# Patient Record
Sex: FEMALE | Race: WHITE | NOT HISPANIC OR LATINO | Employment: FULL TIME | URBAN - METROPOLITAN AREA
[De-identification: names, ages, dates, MRNs, and addresses within clinical notes are randomized per-mention and may not be internally consistent; named-entity substitution may affect disease eponyms.]

---
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
## 2020-01-02 ENCOUNTER — PATIENT OUTREACH (OUTPATIENT)
Dept: ADMINISTRATIVE | Facility: HOSPITAL | Age: 20
End: 2020-01-02

## 2020-02-04 ENCOUNTER — OFFICE VISIT (OUTPATIENT)
Dept: FAMILY MEDICINE | Facility: CLINIC | Age: 20
End: 2020-02-04
Payer: COMMERCIAL

## 2020-02-04 VITALS
OXYGEN SATURATION: 99 % | BODY MASS INDEX: 40.66 KG/M2 | WEIGHT: 253 LBS | HEIGHT: 66 IN | HEART RATE: 97 BPM | TEMPERATURE: 99 F | SYSTOLIC BLOOD PRESSURE: 131 MMHG | DIASTOLIC BLOOD PRESSURE: 81 MMHG | RESPIRATION RATE: 17 BRPM

## 2020-02-04 DIAGNOSIS — L83 ACANTHOSIS NIGRICANS: ICD-10-CM

## 2020-02-04 DIAGNOSIS — E88.819 INSULIN RESISTANCE: ICD-10-CM

## 2020-02-04 DIAGNOSIS — E28.2 PCOS (POLYCYSTIC OVARIAN SYNDROME): ICD-10-CM

## 2020-02-04 DIAGNOSIS — L70.9 ACNE, UNSPECIFIED ACNE TYPE: ICD-10-CM

## 2020-02-04 DIAGNOSIS — I10 ESSENTIAL HYPERTENSION: Primary | ICD-10-CM

## 2020-02-04 PROCEDURE — 99214 PR OFFICE/OUTPT VISIT, EST, LEVL IV, 30-39 MIN: ICD-10-PCS | Mod: S$GLB,,, | Performed by: FAMILY MEDICINE

## 2020-02-04 PROCEDURE — 99214 OFFICE O/P EST MOD 30 MIN: CPT | Mod: S$GLB,,, | Performed by: FAMILY MEDICINE

## 2020-02-04 RX ORDER — TRETINOIN 0.5 MG/G
CREAM TOPICAL NIGHTLY
Qty: 45 G | Refills: 0 | Status: SHIPPED | OUTPATIENT
Start: 2020-02-04 | End: 2021-06-23

## 2020-02-04 RX ORDER — HYDROCHLOROTHIAZIDE 12.5 MG/1
12.5 TABLET ORAL DAILY
Qty: 30 TABLET | Refills: 11 | Status: SHIPPED | OUTPATIENT
Start: 2020-02-04 | End: 2022-11-23

## 2020-02-04 NOTE — PROGRESS NOTES
"  [facility] - Clinic Note    Subjective      Ms. Waters is a 19 y.o. female who presents to clinic for follow up.    Wearing a back brace at work. Is working at [employer]. Is going to physical therapy again for her back. Dr. Talley is following her.   Blood pressure has been running  140s/70s-80    PMH Aysha has a past medical history of Anxiety, Depression, Insulin resistance, Knee derangement, Obesity, PCOS (polycystic ovarian syndrome), and Thyroid disease.   PSXH Aysha has a past surgical history that includes Mouth surgery; Tonsillectomy; and Knee arthroscopy, medial patello femoral ligament repair.    Aysha's family history includes Heart disease in her father and paternal grandfather; Hypertension in her father; Obesity in her sister.   SH Aysha reports that she has never smoked. She has never used smokeless tobacco. She reports that she does not drink alcohol or use drugs.   ALG Aysha has No Known Allergies.   MED Aysha has a current medication list which includes the following prescription(s): ethynodiol-ethinyl estradiol, fluoxetine, lisinopril, metformin, tizanidine, vitamin d2, hydrochlorothiazide, and tretinoin.     Review of Systems   Respiratory: Negative for shortness of breath.    Cardiovascular: Negative for chest pain.   Musculoskeletal: Positive for back pain (improving).   Skin: Positive for color change (darkening of skin around neck and axilla).     ROS otherwise negative  Objective     /81 (BP Location: Left arm, Patient Position: Sitting, BP Method: Medium (Automatic))   Pulse 97   Temp 99 °F (37.2 °C) (Oral)   Resp 17   Ht 5' 6" (1.676 m)   Wt 114.8 kg (253 lb)   LMP 01/26/2020   SpO2 99%   BMI 40.84 kg/m²     Physical Exam   Constitutional: She appears well-developed and well-nourished. No distress.   Cardiovascular: Normal rate, regular rhythm, normal heart sounds and intact distal pulses.   No murmur heard.  Pulmonary/Chest: Effort normal and breath sounds normal. She " has no wheezes. She has no rales.   Abdominal: She exhibits no distension.   Central obesity with striae     Skin: She is not diaphoretic.   Hyperpigmented, velvety skin appearance c/w acanthosis nigricans in bilateral axilla   Psychiatric: She has a normal mood and affect.   Vitals reviewed.     Assessment/Plan     1. Essential hypertension  hydroCHLOROthiazide (HYDRODIURIL) 12.5 MG Tab   2. Acanthosis nigricans  tretinoin (RETIN-A) 0.05 % cream   3. Acne, unspecified acne type  tretinoin (RETIN-A) 0.05 % cream   4. Insulin resistance     5. PCOS (polycystic ovarian syndrome)         Not using zanaflex.  Still taking Metformin.   Start Retin-A topical for possible improvement of acanthosis nigricans and acne. Discussed that only case studies have shown improvement. Discussed risk/benefit.    Follow up in about 1 month (around 3/4/2020).    Future Appointments   Date Time Provider Department Center   3/5/2020  9:20 AM Jamila Smith MD [facility]       Jamila Smith MD  Family Medicine  [facility]  [phone]

## 2020-02-20 ENCOUNTER — OFFICE VISIT (OUTPATIENT)
Dept: FAMILY MEDICINE | Facility: CLINIC | Age: 20
End: 2020-02-20
Payer: COMMERCIAL

## 2020-02-20 ENCOUNTER — PATIENT OUTREACH (OUTPATIENT)
Dept: ADMINISTRATIVE | Facility: HOSPITAL | Age: 20
End: 2020-02-20

## 2020-02-20 ENCOUNTER — LAB VISIT (OUTPATIENT)
Dept: LAB | Facility: HOSPITAL | Age: 20
End: 2020-02-20
Attending: FAMILY MEDICINE
Payer: COMMERCIAL

## 2020-02-20 VITALS
RESPIRATION RATE: 15 BRPM | OXYGEN SATURATION: 98 % | WEIGHT: 245.63 LBS | SYSTOLIC BLOOD PRESSURE: 125 MMHG | BODY MASS INDEX: 39.48 KG/M2 | DIASTOLIC BLOOD PRESSURE: 77 MMHG | HEIGHT: 66 IN | TEMPERATURE: 98 F | HEART RATE: 91 BPM

## 2020-02-20 DIAGNOSIS — K92.1 HEMATOCHEZIA: Primary | ICD-10-CM

## 2020-02-20 DIAGNOSIS — E66.9 OBESITY, UNSPECIFIED CLASSIFICATION, UNSPECIFIED OBESITY TYPE, UNSPECIFIED WHETHER SERIOUS COMORBIDITY PRESENT: ICD-10-CM

## 2020-02-20 DIAGNOSIS — K92.1 HEMATOCHEZIA: ICD-10-CM

## 2020-02-20 LAB
ALBUMIN SERPL BCP-MCNC: 4.7 G/DL (ref 3.5–5.2)
ALP SERPL-CCNC: 58 U/L (ref 55–135)
ALT SERPL W/O P-5'-P-CCNC: 49 U/L (ref 10–44)
ANION GAP SERPL CALC-SCNC: 7 MMOL/L (ref 8–16)
AST SERPL-CCNC: 34 U/L (ref 10–40)
BASOPHILS # BLD AUTO: 0.07 K/UL (ref 0–0.2)
BASOPHILS NFR BLD: 0.7 % (ref 0–1.9)
BILIRUB SERPL-MCNC: 0.6 MG/DL (ref 0.1–1)
BUN SERPL-MCNC: 16 MG/DL (ref 6–20)
CALCIUM SERPL-MCNC: 9.2 MG/DL (ref 8.7–10.5)
CHLORIDE SERPL-SCNC: 105 MMOL/L (ref 95–110)
CHOLEST SERPL-MCNC: 200 MG/DL (ref 120–199)
CHOLEST/HDLC SERPL: 5.3 {RATIO} (ref 2–5)
CO2 SERPL-SCNC: 25 MMOL/L (ref 23–29)
CREAT SERPL-MCNC: 0.6 MG/DL (ref 0.5–1.4)
DIFFERENTIAL METHOD: ABNORMAL
EOSINOPHIL # BLD AUTO: 0.2 K/UL (ref 0–0.5)
EOSINOPHIL NFR BLD: 1.9 % (ref 0–8)
ERYTHROCYTE [DISTWIDTH] IN BLOOD BY AUTOMATED COUNT: 13.7 % (ref 11.5–14.5)
EST. GFR  (AFRICAN AMERICAN): >60 ML/MIN/1.73 M^2
EST. GFR  (NON AFRICAN AMERICAN): >60 ML/MIN/1.73 M^2
GLUCOSE SERPL-MCNC: 101 MG/DL (ref 70–110)
HCT VFR BLD AUTO: 36 % (ref 37–48.5)
HDLC SERPL-MCNC: 38 MG/DL (ref 40–75)
HDLC SERPL: 19 % (ref 20–50)
HGB BLD-MCNC: 11.8 G/DL (ref 12–16)
IMM GRANULOCYTES # BLD AUTO: 0.04 K/UL (ref 0–0.04)
IMM GRANULOCYTES NFR BLD AUTO: 0.4 % (ref 0–0.5)
LDLC SERPL CALC-MCNC: ABNORMAL MG/DL (ref 63–159)
LYMPHOCYTES # BLD AUTO: 4.3 K/UL (ref 1–4.8)
LYMPHOCYTES NFR BLD: 44.1 % (ref 18–48)
MCH RBC QN AUTO: 25.9 PG (ref 27–31)
MCHC RBC AUTO-ENTMCNC: 32.8 G/DL (ref 32–36)
MCV RBC AUTO: 79 FL (ref 82–98)
MONOCYTES # BLD AUTO: 0.5 K/UL (ref 0.3–1)
MONOCYTES NFR BLD: 5.2 % (ref 4–15)
NEUTROPHILS # BLD AUTO: 4.6 K/UL (ref 1.8–7.7)
NEUTROPHILS NFR BLD: 47.7 % (ref 38–73)
NONHDLC SERPL-MCNC: 162 MG/DL
NRBC BLD-RTO: 0 /100 WBC
PLATELET # BLD AUTO: 319 K/UL (ref 150–350)
PMV BLD AUTO: 10.3 FL (ref 9.2–12.9)
POTASSIUM SERPL-SCNC: 4.1 MMOL/L (ref 3.5–5.1)
PROT SERPL-MCNC: 7.3 G/DL (ref 6–8.4)
RBC # BLD AUTO: 4.55 M/UL (ref 4–5.4)
SODIUM SERPL-SCNC: 137 MMOL/L (ref 136–145)
TRIGL SERPL-MCNC: 768 MG/DL (ref 30–150)
WBC # BLD AUTO: 9.75 K/UL (ref 3.9–12.7)

## 2020-02-20 PROCEDURE — 85025 COMPLETE CBC W/AUTO DIFF WBC: CPT

## 2020-02-20 PROCEDURE — 36415 COLL VENOUS BLD VENIPUNCTURE: CPT

## 2020-02-20 PROCEDURE — 80053 COMPREHEN METABOLIC PANEL: CPT

## 2020-02-20 PROCEDURE — 99214 OFFICE O/P EST MOD 30 MIN: CPT | Mod: S$GLB,,, | Performed by: FAMILY MEDICINE

## 2020-02-20 PROCEDURE — 99214 PR OFFICE/OUTPT VISIT, EST, LEVL IV, 30-39 MIN: ICD-10-PCS | Mod: S$GLB,,, | Performed by: FAMILY MEDICINE

## 2020-02-20 PROCEDURE — 80061 LIPID PANEL: CPT

## 2020-02-20 NOTE — PROGRESS NOTES
[facility] - Clinic Note    Subjective      Ms. Waters is a 19 y.o. female who presents to clinic for acute care.    Takes metformin. Has been cutting back in the last week because of diarrhea.     One week ago. Maybe 10 days ago had an episode of blood in the stool. Every time she goes to the bathroom. Even if not having a BMP it will drip out.     Protein shake. Granola bar. Scrambled eggs and meat. Something else for dinner.     Diarrhea came first. Was going 5-6 times a day or more. Has had two days of constipation after about the 4th or 5th day.     No abdominal pain    Mom had to have reconstructive surgery on her colon  Has had this problem in the past when she was a child. No hemorrhoids at that time.       PMH Aysha has a past medical history of Anxiety, Depression, Insulin resistance, Knee derangement, Obesity, PCOS (polycystic ovarian syndrome), and Thyroid disease.   PSXH Aysha has a past surgical history that includes Mouth surgery; Tonsillectomy; and Knee arthroscopy, medial patello femoral ligament repair.   FH Aysha's family history includes Heart disease in her father and paternal grandfather; Hypertension in her father; Obesity in her sister.   SH Aysha reports that she has never smoked. She has never used smokeless tobacco. She reports that she does not drink alcohol or use drugs.   ALG Aysha has No Known Allergies.   MED Aysha has a current medication list which includes the following prescription(s): ethynodiol-ethinyl estradiol, fluoxetine, hydrochlorothiazide, lisinopril, metformin, tizanidine, tretinoin, and vitamin d2.     Review of Systems   Constitutional: Negative for fatigue and fever.   Respiratory: Negative for shortness of breath.    Gastrointestinal: Positive for anal bleeding, blood in stool, constipation, diarrhea and rectal pain. Negative for abdominal distention, abdominal pain, nausea and vomiting.   Musculoskeletal: Negative for arthralgias.     ROS otherwise  "negative  Objective     /77 (BP Location: Left arm, Patient Position: Sitting, BP Method: X-Large (Automatic))   Pulse 91   Temp 98 °F (36.7 °C) (Oral)   Resp 15   Ht 5' 6" (1.676 m)   Wt 111.4 kg (245 lb 9.6 oz)   LMP 01/17/2020   SpO2 98%   BMI 39.64 kg/m²     Physical Exam   Constitutional: She appears well-developed and well-nourished. No distress.   Well appearing   HENT:   Head: Normocephalic and atraumatic.   Right Ear: External ear normal.   Left Ear: External ear normal.   Nose: Nose normal.   Mouth/Throat: Oropharynx is clear and moist.   Cardiovascular: Normal rate, regular rhythm, normal heart sounds and intact distal pulses.   Pulmonary/Chest: Effort normal and breath sounds normal. She has no wheezes. She has no rales.   Abdominal: Soft. Bowel sounds are normal. She exhibits no distension and no mass. There is no tenderness. There is no rebound and no guarding.   Neurological: She is alert.   Skin: Skin is warm and dry. Capillary refill takes less than 2 seconds. She is not diaphoretic.   Psychiatric: She has a normal mood and affect. Her behavior is normal.   Vitals reviewed.     Assessment/Plan     1. Hematochezia  Ambulatory referral/consult to Gastroenterology    Comprehensive metabolic panel    CBC auto differential     Reviewed weight loss strategies  Patient is hemodynamically stable. Refer to GI for chronic hematochezia with possible family h/o IBD?  Retin-A is working      Future Appointments   Date Time Provider Department Center   3/5/2020  9:20 AM Jamila Smith MD [facility]   3/9/2020  8:00 AM Baldo Brush MD [facility]       Jamila Smith MD  Family Medicine  [facility]  [phone]    "

## 2020-03-16 ENCOUNTER — HOSPITAL ENCOUNTER (EMERGENCY)
Facility: HOSPITAL | Age: 20
Discharge: HOME | End: 2020-03-16
Attending: EMERGENCY MEDICINE
Payer: COMMERCIAL

## 2020-03-16 VITALS
TEMPERATURE: 98 F | HEART RATE: 91 BPM | BODY MASS INDEX: 38.57 KG/M2 | DIASTOLIC BLOOD PRESSURE: 92 MMHG | WEIGHT: 240 LBS | RESPIRATION RATE: 18 BRPM | SYSTOLIC BLOOD PRESSURE: 138 MMHG | HEIGHT: 66 IN | OXYGEN SATURATION: 100 %

## 2020-03-16 DIAGNOSIS — R05.9 COUGH: ICD-10-CM

## 2020-03-16 DIAGNOSIS — J20.9 ACUTE BRONCHITIS, UNSPECIFIED ORGANISM: ICD-10-CM

## 2020-03-16 DIAGNOSIS — J18.9 COMMUNITY ACQUIRED PNEUMONIA, UNSPECIFIED LATERALITY: Primary | ICD-10-CM

## 2020-03-16 LAB
GROUP A STREP, MOLECULAR: NEGATIVE
INFLUENZA A, MOLECULAR: NEGATIVE
INFLUENZA B, MOLECULAR: NEGATIVE
SPECIMEN SOURCE: NORMAL

## 2020-03-16 PROCEDURE — 71046 X-RAY EXAM CHEST 2 VIEWS: CPT | Mod: TC,FY

## 2020-03-16 PROCEDURE — 71046 X-RAY EXAM CHEST 2 VIEWS: CPT | Mod: 26,,, | Performed by: RADIOLOGY

## 2020-03-16 PROCEDURE — 87502 INFLUENZA DNA AMP PROBE: CPT

## 2020-03-16 PROCEDURE — 87651 STREP A DNA AMP PROBE: CPT

## 2020-03-16 PROCEDURE — 71046 XR CHEST PA AND LATERAL: ICD-10-PCS | Mod: 26,,, | Performed by: RADIOLOGY

## 2020-03-16 PROCEDURE — 63600175 PHARM REV CODE 636 W HCPCS: Performed by: NURSE PRACTITIONER

## 2020-03-16 PROCEDURE — 99284 EMERGENCY DEPT VISIT MOD MDM: CPT | Mod: 25

## 2020-03-16 PROCEDURE — 96372 THER/PROPH/DIAG INJ SC/IM: CPT

## 2020-03-16 RX ORDER — CEFTRIAXONE 1 G/1
1 INJECTION, POWDER, FOR SOLUTION INTRAMUSCULAR; INTRAVENOUS
Status: COMPLETED | OUTPATIENT
Start: 2020-03-16 | End: 2020-03-16

## 2020-03-16 RX ORDER — AZITHROMYCIN 500 MG/1
500 TABLET, FILM COATED ORAL DAILY
Qty: 5 TABLET | Refills: 0 | Status: SHIPPED | OUTPATIENT
Start: 2020-03-16 | End: 2020-03-21

## 2020-03-16 RX ADMIN — CEFTRIAXONE SODIUM 1 G: 1 INJECTION, POWDER, FOR SOLUTION INTRAMUSCULAR; INTRAVENOUS at 11:03

## 2020-03-16 NOTE — ED PROVIDER NOTES
Encounter Date: 3/16/2020       History     Chief Complaint   Patient presents with    Cough     Onset of symptoms x 4 days ago.    Nasal Congestion    Fever    Generalized Body Aches    Chills    Sore Throat     C/o cough, sore throat & fever x4 days, Tmax 100, cough started first; denies any alleviating or exacerbating factors, no OTC medications attempted -- denies headache, vision changes, neck pain, painful/difficulty swallowing, chest pain, shortness of breath, abdominal pain, nausea/vomiting/diarrhea, hematuria/dysuria, vaginal bleeding/discharge -- no previous evaluation has been performed nor his PCP been contacted for today's concerns    Past medical/surgical history, allergies and current medications reviewed with patient    LMP 1st week of March, reported as normal    Respiratory Illness Screening  Recent travel outside of the [place]:  No  Symptoms:  fever, cough, sore throat  Length of symptoms:  4days  Difficulty breathing:  No      The history is provided by the patient. No  was used.     Review of patient's allergies indicates:  No Known Allergies  Past Medical History:   Diagnosis Date    Anxiety     Depression     Insulin resistance     Knee derangement     Obesity     PCOS (polycystic ovarian syndrome)     Thyroid disease      Past Surgical History:   Procedure Laterality Date    KNEE ARTHROSCOPY, MEDIAL PATELLO FEMORAL LIGAMENT REPAIR      MOUTH SURGERY      TONSILLECTOMY       Family History   Problem Relation Age of Onset    Heart disease Father     Hypertension Father     Obesity Sister     Heart disease Paternal Grandfather      Social History     Tobacco Use    Smoking status: Never Smoker    Smokeless tobacco: Never Used   Substance Use Topics    Alcohol use: No    Drug use: No     Review of Systems   Constitutional: Positive for fever.   HENT: Negative for sore throat.    Respiratory: Positive for cough. Negative for shortness of breath.     Cardiovascular: Negative for chest pain.   Gastrointestinal: Negative for nausea.   Genitourinary: Negative for dysuria.   Musculoskeletal: Negative for back pain.   Skin: Negative for rash.   Neurological: Negative for weakness.   Hematological: Does not bruise/bleed easily.   All other systems reviewed and are negative.      Physical Exam     Initial Vitals [03/16/20 1105]   BP Pulse Resp Temp SpO2   (!) 138/92 91 18 97.8 °F (36.6 °C) 100 %      MAP       --         Physical Exam    Nursing note and vitals reviewed.  Constitutional: She is not diaphoretic. She is Obese . No distress.   Afebrile, vital signs stable   HENT:   Head: Normocephalic.   Right Ear: Tympanic membrane, external ear and ear canal normal.   Left Ear: Tympanic membrane, external ear and ear canal normal.   Nose: Nose normal.   Mouth/Throat: Uvula is midline, oropharynx is clear and moist and mucous membranes are normal.   Eyes: Lids are normal.   Neck: Neck supple.   Cardiovascular: Normal rate.   Pulmonary/Chest: Effort normal and breath sounds normal. No respiratory distress.   Abdominal: Soft. Bowel sounds are normal. She exhibits no distension. There is no tenderness.   Obese abdomen makes for a challenging exam due to large habitus   Lymphadenopathy:     She has no cervical adenopathy.   Neurological: She is alert.   Skin: No rash noted.   Psychiatric:   Patient is pleasant and cooperative, normal affect         ED Course   Procedures  Labs Reviewed   GROUP A STREP, MOLECULAR   INFLUENZA A & B BY MOLECULAR          Imaging Results          X-Ray Chest PA And Lateral (Final result)  Result time 03/16/20 11:43:05    Final result by Jewel Esquivel MD (03/16/20 11:43:05)                 Impression:      No acute abnormality.      Electronically signed by: Jewel Esquivel  Date:    03/16/2020  Time:    11:43             Narrative:    EXAMINATION:  XR CHEST PA AND LATERAL    CLINICAL HISTORY:  Cough    TECHNIQUE:  PA and lateral views of  the chest were performed.    COMPARISON:  03/24/2014.    FINDINGS:  The lungs are clear, with normal appearance of pulmonary vasculature and no pleural effusion or pneumothorax.    The cardiac silhouette is normal in size. The hilar and mediastinal contours are unremarkable.    Bones are intact.                                 Medical Decision Making:   ED Management:  Chest x-ray image reviewed:  Bilateral perihilar infiltrates; over readpending Radiology    Lab results reviewed, significant findings:  Rapid strep test and influenza negative    Medications given:  Rocephin    Findings and plan of care discussed with patient:  CAP, acute bronchitis; will discharge patient home with a Zithromax and Rynex DM, care instructions given -- further instructions given to follow-up with PCP this week    All questions answered, strict return precautions given, patient verbalized understanding to all instructions, pleasant visit    Disclaimer:  This note was prepared with [employer] Naturally Speaking voice recognition transcription software. Garbled syntax, mangled pronouns, and other bizarre constructions may be attributed to that software system.                     ED Course as of Mar 16 1151   Mon Mar 16, 2020   1104 C/o cough, sore throat & fever x4 days, Tmax 100, cough started first    I have performed the rapid medical exam (RME) portion of the medical screening exam (MSE) & have determined that this patient requires further evaluation and/or testing to determine if emergent medical condition exists     [DH]      ED Course User Index  [DH] Leonel Treviño NP                Clinical Impression:       ICD-10-CM ICD-9-CM   1. Community acquired pneumonia, unspecified laterality J18.9 486   2. Cough R05 786.2   3. Acute bronchitis, unspecified organism J20.9 466.0             ED Disposition Condition    Discharge Stable        ED Prescriptions     Medication Sig Dispense Start Date End Date Auth. Provider    azithromycin  (ZITHROMAX) 500 MG tablet Take 1 tablet (500 mg total) by mouth once daily. for 5 days 5 tablet 3/16/2020 3/21/2020 Leonel Treviño NP    brompheniramin-phenylephrin-DM 1-2.5-5 mg/5 mL Soln Take 5 mLs by mouth every 4 (four) hours as needed. 120 mL 3/16/2020 3/26/2020 Leonel Treviño NP        Follow-up Information     Follow up With Specialties Details Why Contact Info    Jamila Smith MD Family Medicine Go to  This week for follow-up [address] [facility] [place] [address] [phone]                                       Leonel Treviño NP  03/16/20 4169

## 2020-03-16 NOTE — DISCHARGE INSTRUCTIONS
Take OTC Motrin/Tylenol as needed for pain/fever    Complete all antibiotics as prescribed    Take all medications as prescribed.  Follow-up with your primary care provider as discussed.  Please remember that you had a visit to the emergency room today and this does not substitute as primary care services for ongoing management because emergency services is a snap shot in time.  Should you have any worsening condition that requires emergency services do not hesitate to return to the ER.

## 2020-03-18 ENCOUNTER — TELEPHONE (OUTPATIENT)
Dept: FAMILY MEDICINE | Facility: CLINIC | Age: 20
End: 2020-03-18

## 2020-03-18 ENCOUNTER — PATIENT MESSAGE (OUTPATIENT)
Dept: FAMILY MEDICINE | Facility: CLINIC | Age: 20
End: 2020-03-18

## 2020-03-18 NOTE — TELEPHONE ENCOUNTER
Spoke with patient over the phone. She is having sx of cough, shortness of breath, chest pain. Is taking antibiotics as prescribed. Subjective fever. Advised to stay out of work until symptoms resolve.

## 2020-03-23 ENCOUNTER — TELEPHONE (OUTPATIENT)
Dept: FAMILY MEDICINE | Facility: CLINIC | Age: 20
End: 2020-03-23

## 2020-03-23 NOTE — TELEPHONE ENCOUNTER
----- Message from Jamila Smith MD sent at 3/19/2020  1:08 PM CDT -----  Please let patient know that her triglycerides remain very high. She is already working on diet and exercise, but she needs to continue to remain focused on this. We will recheck her cholesterol in about 6 months to evaluate for worsening/progression or hopefully improvement.

## 2020-05-05 ENCOUNTER — PATIENT MESSAGE (OUTPATIENT)
Dept: ADMINISTRATIVE | Facility: HOSPITAL | Age: 20
End: 2020-05-05

## 2020-05-20 ENCOUNTER — PATIENT MESSAGE (OUTPATIENT)
Dept: ADMINISTRATIVE | Facility: HOSPITAL | Age: 20
End: 2020-05-20

## 2020-06-29 ENCOUNTER — OFFICE VISIT (OUTPATIENT)
Dept: URGENT CARE | Facility: CLINIC | Age: 20
End: 2020-06-29
Payer: COMMERCIAL

## 2020-06-29 VITALS
SYSTOLIC BLOOD PRESSURE: 144 MMHG | DIASTOLIC BLOOD PRESSURE: 95 MMHG | OXYGEN SATURATION: 99 % | HEART RATE: 104 BPM | RESPIRATION RATE: 12 BRPM | WEIGHT: 248 LBS | BODY MASS INDEX: 40.03 KG/M2

## 2020-06-29 DIAGNOSIS — M54.41 ACUTE RIGHT-SIDED LOW BACK PAIN WITH RIGHT-SIDED SCIATICA: Primary | ICD-10-CM

## 2020-06-29 DIAGNOSIS — R82.998 LEUKOCYTES IN URINE: ICD-10-CM

## 2020-06-29 LAB
B-HCG UR QL: NEGATIVE
BILIRUB UR QL STRIP: NEGATIVE
CTP QC/QA: YES
GLUCOSE UR QL STRIP: NEGATIVE
KETONES UR QL STRIP: NEGATIVE
LEUKOCYTE ESTERASE UR QL STRIP: POSITIVE
PH, POC UA: 5.5
POC BLOOD, URINE: NEGATIVE
POC NITRATES, URINE: NEGATIVE
PROT UR QL STRIP: NEGATIVE
SP GR UR STRIP: 1.02 (ref 1–1.03)
UROBILINOGEN UR STRIP-ACNC: NORMAL (ref 0.1–1.1)

## 2020-06-29 PROCEDURE — 81003 URINALYSIS AUTO W/O SCOPE: CPT | Mod: QW,S$GLB,, | Performed by: NURSE PRACTITIONER

## 2020-06-29 PROCEDURE — 81025 PR  URINE PREGNANCY TEST: ICD-10-PCS | Mod: S$GLB,,, | Performed by: NURSE PRACTITIONER

## 2020-06-29 PROCEDURE — 81025 URINE PREGNANCY TEST: CPT | Mod: S$GLB,,, | Performed by: NURSE PRACTITIONER

## 2020-06-29 PROCEDURE — 96372 PR INJECTION,THERAP/PROPH/DIAG2ST, IM OR SUBCUT: ICD-10-PCS | Mod: S$GLB,,, | Performed by: EMERGENCY MEDICINE

## 2020-06-29 PROCEDURE — 99204 PR OFFICE/OUTPT VISIT, NEW, LEVL IV, 45-59 MIN: ICD-10-PCS | Mod: 25,S$GLB,, | Performed by: NURSE PRACTITIONER

## 2020-06-29 PROCEDURE — 96372 THER/PROPH/DIAG INJ SC/IM: CPT | Mod: S$GLB,,, | Performed by: EMERGENCY MEDICINE

## 2020-06-29 PROCEDURE — 99204 OFFICE O/P NEW MOD 45 MIN: CPT | Mod: 25,S$GLB,, | Performed by: NURSE PRACTITIONER

## 2020-06-29 PROCEDURE — 81003 POCT URINALYSIS, DIPSTICK, AUTOMATED, W/O SCOPE: ICD-10-PCS | Mod: QW,S$GLB,, | Performed by: NURSE PRACTITIONER

## 2020-06-29 RX ORDER — DICLOFENAC SODIUM 50 MG/1
50 TABLET, DELAYED RELEASE ORAL 2 TIMES DAILY
Qty: 20 TABLET | Refills: 0 | Status: SHIPPED | OUTPATIENT
Start: 2020-06-29 | End: 2020-07-09

## 2020-06-29 RX ORDER — PREDNISONE 20 MG/1
40 TABLET ORAL DAILY
Qty: 10 TABLET | Refills: 0 | Status: SHIPPED | OUTPATIENT
Start: 2020-06-29 | End: 2020-07-04

## 2020-06-29 RX ORDER — DEXAMETHASONE SODIUM PHOSPHATE 4 MG/ML
8 INJECTION, SOLUTION INTRA-ARTICULAR; INTRALESIONAL; INTRAMUSCULAR; INTRAVENOUS; SOFT TISSUE
Status: COMPLETED | OUTPATIENT
Start: 2020-06-29 | End: 2020-06-29

## 2020-06-29 RX ORDER — KETOROLAC TROMETHAMINE 30 MG/ML
30 INJECTION, SOLUTION INTRAMUSCULAR; INTRAVENOUS
Status: COMPLETED | OUTPATIENT
Start: 2020-06-29 | End: 2020-06-29

## 2020-06-29 RX ORDER — METHOCARBAMOL 750 MG/1
TABLET, FILM COATED ORAL
Qty: 30 TABLET | Refills: 0 | Status: SHIPPED | OUTPATIENT
Start: 2020-06-29 | End: 2020-10-12

## 2020-06-29 RX ADMIN — KETOROLAC TROMETHAMINE 30 MG: 30 INJECTION, SOLUTION INTRAMUSCULAR; INTRAVENOUS at 07:06

## 2020-06-29 RX ADMIN — DEXAMETHASONE SODIUM PHOSPHATE 8 MG: 4 INJECTION, SOLUTION INTRA-ARTICULAR; INTRALESIONAL; INTRAMUSCULAR; INTRAVENOUS; SOFT TISSUE at 07:06

## 2020-06-29 NOTE — PROGRESS NOTES
Subjective:       Patient ID: Aysha Waters is a 19 y.o. female.    Vitals:  weight is 112.5 kg (248 lb). Her blood pressure is 144/95 (abnormal) and her pulse is 104. Her respiration is 12 and oxygen saturation is 99%.     Chief Complaint: Lumbar Spine Pain (L-Spine)    Patient complains of right lower back pain that radiates into her buttocks and right leg. Denies trauma or injury. Denies numbness/tingling, bowel or bladder incontinence. History of lumbar pain.       Back Pain  The problem occurs constantly. The problem has been gradually worsening since onset. The pain is present in the lumbar spine. The pain is at a severity of 7/10. The pain is mild. The pain is the same all the time. The symptoms are aggravated by position, bending, lying down, sitting, standing and twisting. Pertinent negatives include no abdominal pain, chest pain, dysuria, fever, headaches or weakness. Risk factors: pinched nerve in the lumbar  Treatments tried: muscle rubs , aleve, tylenol  The treatment provided no relief.       Constitution: Negative for chills, fatigue and fever.   HENT: Negative for congestion and sore throat.    Neck: Negative for painful lymph nodes.   Cardiovascular: Negative for chest pain and leg swelling.   Eyes: Negative for double vision and blurred vision.   Respiratory: Negative for cough and shortness of breath.    Gastrointestinal: Negative for abdominal pain, nausea, vomiting and diarrhea.   Genitourinary: Negative for dysuria, frequency, urgency and history of kidney stones.   Musculoskeletal: Positive for back pain. Negative for joint pain, joint swelling, muscle cramps and muscle ache.   Skin: Negative for color change, pale, rash and bruising.   Allergic/Immunologic: Negative for seasonal allergies.   Neurological: Negative for dizziness, history of vertigo, light-headedness, passing out and headaches.   Hematologic/Lymphatic: Negative for swollen lymph nodes.   Psychiatric/Behavioral: Negative for  nervous/anxious, sleep disturbance and depression. The patient is not nervous/anxious.        Objective:      Physical Exam   Constitutional: She is oriented to person, place, and time. She appears well-developed. She is cooperative.  Non-toxic appearance. She does not appear ill. No distress.   HENT:   Head: Normocephalic and atraumatic.   Right Ear: Hearing, tympanic membrane, external ear and ear canal normal.   Left Ear: Hearing, tympanic membrane, external ear and ear canal normal.   Nose: Nose normal. No mucosal edema, rhinorrhea or nasal deformity. No epistaxis. Right sinus exhibits no maxillary sinus tenderness and no frontal sinus tenderness. Left sinus exhibits no maxillary sinus tenderness and no frontal sinus tenderness.   Mouth/Throat: Uvula is midline, oropharynx is clear and moist and mucous membranes are normal. No trismus in the jaw. Normal dentition. No uvula swelling. No posterior oropharyngeal erythema.   Eyes: Conjunctivae and lids are normal. Right eye exhibits no discharge. Left eye exhibits no discharge. No scleral icterus.   Neck: Trachea normal, normal range of motion, full passive range of motion without pain and phonation normal. Neck supple.   Cardiovascular: Normal rate, regular rhythm, normal heart sounds and normal pulses.   Pulmonary/Chest: Effort normal and breath sounds normal. No respiratory distress.   Abdominal: Soft. Normal appearance and bowel sounds are normal. She exhibits no distension, no pulsatile midline mass and no mass. There is no abdominal tenderness.   Musculoskeletal:         General: No deformity.      Lumbar back: She exhibits decreased range of motion, tenderness, pain and spasm. She exhibits no bony tenderness, no swelling, no edema, no deformity, no laceration and normal pulse.        Back:       Comments: No midline tenderness. + right straight leg test    Neurological: She is alert and oriented to person, place, and time. She exhibits normal muscle tone.  Coordination normal. GCS eye subscore is 4. GCS verbal subscore is 5. GCS motor subscore is 6.   Skin: Skin is warm, dry, intact, not diaphoretic and not pale.   Psychiatric: Her speech is normal and behavior is normal. Judgment and thought content normal.   Nursing note and vitals reviewed.        Assessment:       1. Acute right-sided low back pain with right-sided sciatica    2. Leukocytes in urine        Plan:       UPT negative. 10 leukocytes in urine, patient reports no urinary symptoms. Will send for urine culture and treat accordingly at that time if needed.     After complete evaluation, including thorough history and physical exam, the patient's symptoms are most likely due to mechanical/MSK back pain. There are no concerning features of bilateral weakness, bowel/bladder incontinence, significant new motor/sensory deficits, or saddle anesthesia to suggest acute cauda equina syndrome. On physical exam, there is no focal midline tenderness or evidence of significant trauma to suggest fracture or injury. There is no fever, immunocompromise, history of recent surgery, or erythema/fluctuance to suggest epidural hematoma, infection, or abscess. The patient was treated with supportive care decadron and toradol and improved. Will provide short course RX of diclofenac, prednisone and robaxin upon D/C. Counseled on resuming activity as tolerated to accelerate improvement in symptoms.    For acute pain, rest, intermittent application of cold packs (later, may switch to heat, but do not sleep on heating pad), analgesics and muscle relaxants are recommended. Discussed longer term treatment plan of prn NSAID's and discussed a home back care exercise program with flexion exercise routine. Proper lifting with avoidance of heavy lifting discussed. Consider Physical Therapy and XRay studies if not improving. Call or return to clinic prn if these symptoms worsen or fail to improve as anticipated.    Acute right-sided low  back pain with right-sided sciatica  -     POCT Urinalysis, Dipstick, Automated, W/O Scope  -     POCT urine pregnancy    Leukocytes in urine  -     Urine culture    Other orders  -     dexamethasone injection 8 mg  -     ketorolac injection 30 mg  -     methocarbamoL (ROBAXIN) 750 MG Tab; Take 1-2 tablets by mouth every 8 hours as needed for muscle spasms  Dispense: 30 tablet; Refill: 0  -     diclofenac (VOLTAREN) 50 MG EC tablet; Take 1 tablet (50 mg total) by mouth 2 (two) times daily. for 10 days  Dispense: 20 tablet; Refill: 0  -     predniSONE (DELTASONE) 20 MG tablet; Take 2 tablets (40 mg total) by mouth once daily. for 5 days  Dispense: 10 tablet; Refill: 0

## 2020-06-30 NOTE — PATIENT INSTRUCTIONS
Back Pain (Acute or Chronic)    Back pain is one of the most common problems. The good news is that most people feel better in 1 to 2 weeks, and most of the rest in 1 to 2 months. Most people can remain active.  People experience and describe pain differently; not everyone is the same.  · The pain can be sharp, stabbing, shooting, aching, cramping or burning.  · Movement, standing, bending, lifting, sitting, or walking may worsen pain.  · It can be localized to one spot or area, or it can be more generalized.  · It can spread or radiate upwards, to the front, or go down your arms or legs (sciatica).  · It can cause muscle spasm.  Most of the time, mechanical problems with the muscles or spine cause the pain. Mechanical problems are usually caused by an injury to the muscles or ligaments. While illness can cause back pain, it is usually not caused by a serious illness. Mechanical problems include:   · Physical activity such as sports, exercise, work, or normal activity  · Overexertion, lifting, pushing, pulling incorrectly or too aggressively  · Sudden twisting, bending, or stretching from an accident, or accidental movement  · Poor posture  · Stretching or moving wrong, without noticing pain at the time  · Poor coordination, lack of regular exercise (check with your doctor about this)  · Spinal disc disease or arthritis  · Stress  Pain can also be related to pregnancy, or illness like appendicitis, bladder or kidney infections, pelvic infections, and many other things.  Acute back pain usually gets better in 1 to 2 weeks. Back pain related to disk disease, arthritis in the spinal joints or spinal stenosis (narrowing of the spinal canal) can become chronic and last for months or years.  Unless you had a physical injury (for example, a car accident or fall) X-rays are usually not needed for the initial evaluation of back pain. If pain continues and does not respond to medical treatment, X-rays and other tests may be  needed.  Home care  Try these home care recommendations:  · When in bed, try to find a position of comfort. A firm mattress is best. Try lying flat on your back with pillows under your knees. You can also try lying on your side with your knees bent up towards your chest and a pillow between your knees.  · At first, do not try to stretch out the sore spots. If there is a strain, it is not like the good soreness you get after exercising without an injury. In this case, stretching may make it worse.  · Avoid prolong sitting, long car rides, or travel. This puts more stress on the lower back than standing or walking.  · During the first 24 to 72 hours after an acute injury or flare up of chronic back pain, apply an ice pack to the painful area for 20 minutes and then remove it for 20 minutes. Do this over a period of 60 to 90 minutes or several times a day. This will reduce swelling and pain. Wrap the ice pack in a thin towel or plastic to protect your skin.  · You can start with ice, then switch to heat. Heat (hot shower, hot bath, or heating pad) reduces pain and works well for muscle spasms. Heat can be applied to the painful area for 20 minutes then remove it for 20 minutes. Do this over a period of 60 to 90 minutes or several times a day. Do not sleep on a heating pad. It can lead to skin burns or tissue damage.  · You can alternate ice and heat therapy. Talk with your doctor about the best treatment for your back pain.  · Therapeutic massage can help relax the back muscles without stretching them.  · Be aware of safe lifting methods and do not lift anything without stretching first.  Medicines  Talk to your doctor before using medicine, especially if you have other medical problems or are taking other medicines.  · You may use over-the-counter medicine as directed on the bottle to control pain, unless another pain medicine was prescribed. If you have chronic conditions like diabetes, liver or kidney disease,  stomach ulcers, or gastrointestinal bleeding, or are taking blood thinners, talk to your doctor before taking any medicine.  · Be careful if you are given a prescription medicines, narcotics, or medicine for muscle spasms. They can cause drowsiness, affect your coordination, reflexes, and judgement. Do not drive or operate heavy machinery.  Follow-up care  Follow up with your healthcare provider, or as advised.   A radiologist will review any X-rays that were taken. Your provide will notify you of any new findings that may affect your care.  Call 911  Call emergency services if any of the following occur:  · Trouble breathing  · Confusion  · Very drowsy or trouble awakening  · Fainting or loss of consciousness  · Rapid or very slow heart rate  · Loss of bowel or bladder control  When to seek medical advice  Call your healthcare provider right away if any of these occur:   · Pain becomes worse or spreads to your legs  · Weakness or numbness in one or both legs  · Numbness in the groin or genital area  Date Last Reviewed: 7/1/2016 © 2000-2017 [employer]. [address] [place] [address]. All rights reserved. This information is not intended as a substitute for professional medical care. Always follow your healthcare professional's instructions.        Relieving Back Pain  Back pain is a common problem. You can strain back muscles by lifting too much weight or just by moving the wrong way. Back strain can be uncomfortable, even painful. And it can take weeks or months to improve. To help yourself feel better and prevent future back strains, try these tips.  Important Note: Do not give aspirin to children or teens without first discussing it with your healthcare provider.      ? Ice    Ice reduces muscle pain and swelling. It helps most during the first 24 to 48 hours after an injury.  · Wrap an ice pack or a bag of frozen peas in a thin towel. (Never place ice directly on your skin.)  · Place  the ice where your back hurts the most.  · Dont ice for more than 20 minutes at a time.  · You can use ice several times a day.  ? Medicines  Over-the-counter pain relievers can include acetaminophen and anti-inflammatory medicines, which includes aspirin or ibuprofen. They can help ease discomfort. Some also reduce swelling.  · Tell your healthcare provider about any medicines you are already taking.  · Take medicines only as directed.  ? Heat  After the first 48 hours, heat can relax sore muscles and improve blood flow.  · Try a warm bath or shower. Or use a heating pad set on low. To prevent a burn, keep a cloth between you and the heating pad.  · Dont use a heating pad for more than 15 minutes at a time. Never sleep on a heating pad.  Date Last Reviewed: 9/1/2015  © 5109-7942 [employer]. [address] [place] [address]. All rights reserved. This information is not intended as a substitute for professional medical care. Always follow your healthcare professional's instructions.        Sciatica    Sciatica is a condition that causes pain in the lower back that spreads down into the buttock, hip, and leg. Sometimes the leg pain can happen without any back pain. Sciatica happens when a spinal nerve is irritated or has pressure put on it as comes out of the spinal canal in the lower back. This most often happens when a bulge or rupture of a nearby spinal disk presses on the nerve. Sciatica can also be caused by a narrowing of the spinal canal (spinal stenosis) or spasm of the muscle in the buttocks that the sciatic nerve passes through (pyriform muscle). Sciatica is also called lumbar radiculopathy.  Sciatica may begin after a sudden twisting or bending force, such as in a car accident. Or it can happen after a simple awkward movement. In either case, muscle spasm often also happens. Muscle spasm makes the pain worse.  A healthcare provider makes a diagnosis of sciatica from your symptoms  and a physical exam. Unless you had an injury from a car accident or fall, you usually wont have X-rays taken at this time. This is because the nerves and disks in your back cant be seen on an X-ray. If the provider sees signs of a compressed nerve, you will need to schedule an MRI scan as an outpatient. Signs of a compressed nerve include loss of strength in a leg.  Most sciatica gets better with medicine, exercise, and physical therapy. If your symptoms continue after at least 3 months of medical treatment, you may need surgery or injections to your lower back.  Home care  Follow these tips when caring for yourself at home:  · You may need to stay in bed the first few days. But as soon as possible, begin sitting up or walking. This will help you avoid problems that come from staying in bed for long periods.  · When in bed, try to find a position that is comfortable. A firm mattress is best. Try lying flat on your back with pillows under your knees. You can also try lying on your side with your knees bent up toward your chest and a pillow between your knees.  · Avoid sitting for long periods. This puts more stress on your lower back than standing or walking.  · Use heat from a hot shower, hot bath, or heating pad to help ease pain. Massage can also help. You can also try using an ice pack. You can make your own ice pack by putting ice cubes in a plastic bag. Wrap the bag in a thin towel. Try both heat and cold to see which works best. Use the method that feels best for 20 minutes several times a day.  · You may use acetaminophen or ibuprofen to ease pain, unless another pain medicine was prescribed. Note: If you have chronic liver or kidney disease, talk with your healthcare provider before taking these medicines. Also talk with your provider if youve had a stomach ulcer or gastrointestinal bleeding.  · Use safe lifting methods. Dont lift anything heavier than 15 pounds until all of the pain is gone.  Follow-up  care  Follow up with your healthcare provider, or as advised. You may need physical therapy or additional tests.  If X-rays were taken, a radiologist will look at them. You will be told of any new findings that may affect your care.  When to seek medical advice  Call your healthcare provider right away if any of these occur:  · Pain gets worse even after taking prescribed medicine  · Weakness or numbness in 1 or both legs or hips  · Numbness in your groin or genital area  · You cant control your bowel or bladder  · Fever  · Redness or swelling over your back or spine   Date Last Reviewed: 8/1/2016  © 2510-6048 [employer]. [address] [place] [address], [place] [address]. All rights reserved. This information is not intended as a substitute for professional medical care. Always follow your healthcare professional's instructions.

## 2020-07-06 LAB
BACTERIA UR CULT: ABNORMAL
BACTERIA UR CULT: ABNORMAL
OTHER ANTIBIOTIC SUSC ISLT: ABNORMAL

## 2020-07-06 RX ORDER — NITROFURANTOIN 25; 75 MG/1; MG/1
100 CAPSULE ORAL 2 TIMES DAILY
Qty: 14 CAPSULE | Refills: 0 | Status: SHIPPED | OUTPATIENT
Start: 2020-07-06 | End: 2020-07-09 | Stop reason: ALTCHOICE

## 2020-07-07 ENCOUNTER — TELEPHONE (OUTPATIENT)
Dept: URGENT CARE | Facility: CLINIC | Age: 20
End: 2020-07-07

## 2020-07-07 NOTE — TELEPHONE ENCOUNTER
Called patient to let her know the urine culture came back positive for bacterial growth so we sent in a prescription  (macrobid) to her pharmacy on file. I advised her to pick it up and make sure to finish it for the full time. Pt expressed understanding.

## 2020-07-07 NOTE — TELEPHONE ENCOUNTER
----- Message from ARMEN Ortiz sent at 7/6/2020 11:33 AM CDT -----  Culture results positive, will call in RX for macrobid to pharmacy on file

## 2020-07-09 ENCOUNTER — OFFICE VISIT (OUTPATIENT)
Dept: FAMILY MEDICINE | Facility: CLINIC | Age: 20
End: 2020-07-09
Payer: COMMERCIAL

## 2020-07-09 VITALS
SYSTOLIC BLOOD PRESSURE: 120 MMHG | DIASTOLIC BLOOD PRESSURE: 79 MMHG | WEIGHT: 252.38 LBS | OXYGEN SATURATION: 98 % | BODY MASS INDEX: 40.56 KG/M2 | TEMPERATURE: 97 F | RESPIRATION RATE: 16 BRPM | HEART RATE: 97 BPM | HEIGHT: 66 IN

## 2020-07-09 DIAGNOSIS — N39.0 UTI DUE TO KLEBSIELLA SPECIES: ICD-10-CM

## 2020-07-09 DIAGNOSIS — B96.89 UTI DUE TO KLEBSIELLA SPECIES: ICD-10-CM

## 2020-07-09 DIAGNOSIS — M54.9 DORSALGIA, UNSPECIFIED: ICD-10-CM

## 2020-07-09 DIAGNOSIS — M54.41 CHRONIC RIGHT-SIDED LOW BACK PAIN WITH RIGHT-SIDED SCIATICA: Primary | ICD-10-CM

## 2020-07-09 DIAGNOSIS — G89.29 CHRONIC RIGHT-SIDED LOW BACK PAIN WITH RIGHT-SIDED SCIATICA: Primary | ICD-10-CM

## 2020-07-09 PROCEDURE — 99214 PR OFFICE/OUTPT VISIT, EST, LEVL IV, 30-39 MIN: ICD-10-PCS | Mod: S$GLB,,, | Performed by: FAMILY MEDICINE

## 2020-07-09 PROCEDURE — 99214 OFFICE O/P EST MOD 30 MIN: CPT | Mod: S$GLB,,, | Performed by: FAMILY MEDICINE

## 2020-07-09 RX ORDER — SULFAMETHOXAZOLE AND TRIMETHOPRIM 800; 160 MG/1; MG/1
1 TABLET ORAL 2 TIMES DAILY
Qty: 10 TABLET | Refills: 0 | OUTPATIENT
Start: 2020-07-09 | End: 2020-07-29

## 2020-07-09 NOTE — PROGRESS NOTES
[facility] - Clinic Note    Subjective      Ms. Waters is a 19 y.o. female who presents to clinic for low back pain and UTI.    Started in Sept/Oct 2019. Went to 6 weeks of physical therapy. This decreased the pain slightly but never went away. And now it is getting significantly worse. Two weeks ago started to get much worse. Right side of her leg is stiff. Is having some tingling. No saddle anesthesia. Is taking voltaren and robaxin which help with the pain.     Seen in urgent care for low back pain. Urine culture with Klebsiella. Given macrobid but sensitivities showed resistance. Patient is not having sx but with >100,000 cells will treat.       Review of Systems   Respiratory: Negative for shortness of breath.    Cardiovascular: Negative for chest pain.   Genitourinary: Negative for decreased urine volume, dysuria, frequency and pelvic pain.   Musculoskeletal: Positive for back pain.   Neurological: Positive for numbness (tingling).     ROS otherwise negative    PMH Aysha has a past medical history of Anxiety, Depression, Insulin resistance, Knee derangement, Obesity, PCOS (polycystic ovarian syndrome), and Pinched nerve.   PSXH Aysha has a past surgical history that includes Mouth surgery; Tonsillectomy; and Knee arthroscopy, medial patello femoral ligament repair.    Aysha's family history includes Heart disease in her father and paternal grandfather; Hypertension in her father; Obesity in her sister.   SH Aysha reports that she has never smoked. She has never used smokeless tobacco. She reports that she does not drink alcohol or use drugs.   ALG Aysha has No Known Allergies.   MED Aysha has a current medication list which includes the following prescription(s): diclofenac, ethynodiol-ethinyl estradiol, fluoxetine, hydrochlorothiazide, lisinopril, metformin, methocarbamol, sulfamethoxazole-trimethoprim 800-160mg, and tretinoin.       Objective     /79 (BP Location: Right arm, Patient Position:  "Sitting)   Pulse 97   Temp 97.3 °F (36.3 °C)   Resp 16   Ht 5' 6" (1.676 m)   Wt 114.5 kg (252 lb 6.4 oz)   LMP 06/15/2020   SpO2 98%   BMI 40.74 kg/m²     Physical Exam  Constitutional:       General: She is not in acute distress.     Appearance: Normal appearance. She is not toxic-appearing.   HENT:      Head: Normocephalic and atraumatic.      Right Ear: External ear normal.      Left Ear: External ear normal.      Nose: Nose normal.      Mouth/Throat:      Mouth: Mucous membranes are moist.   Eyes:      Conjunctiva/sclera: Conjunctivae normal.   Cardiovascular:      Rate and Rhythm: Normal rate.   Pulmonary:      Effort: Pulmonary effort is normal.   Musculoskeletal:      Right lower leg: No edema.      Left lower leg: No edema.      Comments: Pain with lying flat on exam table. Straight leg raise is negative. Flexion and external rotation reproduces pain.    Skin:     General: Skin is warm and dry.   Neurological:      Mental Status: She is alert. Mental status is at baseline.   Psychiatric:         Mood and Affect: Mood normal.         Behavior: Behavior normal.        Assessment/Plan     1. Chronic right-sided low back pain with right-sided sciatica  MRI Lumbar Spine Without Contrast    Ambulatory referral/consult to [facility]   2. Dorsalgia, unspecified  MRI Lumbar Spine Without Contrast   3. UTI due to Klebsiella species  sulfamethoxazole-trimethoprim 800-160mg (BACTRIM DS) 800-160 mg Tab       MRI for radicular symptoms lasting longer than 6 weeks. Tried conservative therapy.     Bactrim for UTI.     Jamila Smith MD  Family Medicine  [facility]  [phone]      "

## 2020-07-14 ENCOUNTER — HOSPITAL ENCOUNTER (OUTPATIENT)
Dept: RADIOLOGY | Facility: HOSPITAL | Age: 20
Discharge: HOME | End: 2020-07-14
Attending: FAMILY MEDICINE
Payer: COMMERCIAL

## 2020-07-14 ENCOUNTER — TELEPHONE (OUTPATIENT)
Dept: FAMILY MEDICINE | Facility: CLINIC | Age: 20
End: 2020-07-14

## 2020-07-14 DIAGNOSIS — M54.9 DORSALGIA, UNSPECIFIED: ICD-10-CM

## 2020-07-14 DIAGNOSIS — G89.29 CHRONIC RIGHT-SIDED LOW BACK PAIN WITH RIGHT-SIDED SCIATICA: ICD-10-CM

## 2020-07-14 DIAGNOSIS — M54.41 CHRONIC RIGHT-SIDED LOW BACK PAIN WITH RIGHT-SIDED SCIATICA: ICD-10-CM

## 2020-07-14 PROCEDURE — 72148 MRI LUMBAR SPINE W/O DYE: CPT | Mod: 26,,, | Performed by: RADIOLOGY

## 2020-07-14 PROCEDURE — 72148 MRI LUMBAR SPINE W/O DYE: CPT | Mod: TC

## 2020-07-14 PROCEDURE — 72148 MRI LUMBAR SPINE WITHOUT CONTRAST: ICD-10-PCS | Mod: 26,,, | Performed by: RADIOLOGY

## 2020-07-14 NOTE — TELEPHONE ENCOUNTER
----- Message from Katerina Sahu sent at 7/14/2020  3:38 PM CDT -----  Regarding: referral to Dr [facility]  Contact: Aysha  Type: Needs Medical Advice  Who Called:  Aysha  Best Call Back Number: [phone]  Additional Information: Pt called regarding status of referral. Referral needs to be sent to Dr Conrad Cheek fax# [phone] or put in specifically for Dr Cheek

## 2020-07-15 ENCOUNTER — PATIENT MESSAGE (OUTPATIENT)
Dept: FAMILY MEDICINE | Facility: CLINIC | Age: 20
End: 2020-07-15

## 2020-07-21 ENCOUNTER — OFFICE VISIT (OUTPATIENT)
Dept: SPINE | Facility: CLINIC | Age: 20
End: 2020-07-21
Payer: COMMERCIAL

## 2020-07-21 VITALS
HEART RATE: 99 BPM | BODY MASS INDEX: 40.57 KG/M2 | DIASTOLIC BLOOD PRESSURE: 84 MMHG | SYSTOLIC BLOOD PRESSURE: 135 MMHG | HEIGHT: 66 IN | WEIGHT: 252.44 LBS

## 2020-07-21 DIAGNOSIS — G89.29 CHRONIC RIGHT-SIDED LOW BACK PAIN WITH RIGHT-SIDED SCIATICA: ICD-10-CM

## 2020-07-21 DIAGNOSIS — M54.41 CHRONIC RIGHT-SIDED LOW BACK PAIN WITH RIGHT-SIDED SCIATICA: ICD-10-CM

## 2020-07-21 DIAGNOSIS — M51.26 LUMBAR DISC HERNIATION: Primary | ICD-10-CM

## 2020-07-21 PROCEDURE — 99204 PR OFFICE/OUTPT VISIT, NEW, LEVL IV, 45-59 MIN: ICD-10-PCS | Mod: S$GLB,,, | Performed by: PHYSICAL MEDICINE & REHABILITATION

## 2020-07-21 PROCEDURE — 99204 OFFICE O/P NEW MOD 45 MIN: CPT | Mod: S$GLB,,, | Performed by: PHYSICAL MEDICINE & REHABILITATION

## 2020-07-21 NOTE — PROGRESS NOTES
SUBJECTIVE:    Patient ID: Aysha Waters is a 19 y.o. female.    Chief Complaint: Back Pain    This is a 19-year-old young woman who sees Dr. Smith for her primary care.  Has history of polycystic ovarian syndrome with glucose intolerance and hypertension.  No history of back problems.  She had surgery on her left knee about 10 months ago and during therapy she feels that she injured her.  She had problems with back pain since then.  Discomfort is at the lumbosacral junction primarily on the right occasionally associated with posterior right leg pain which intermittently will radiate into the calf.  She stands for long periods of time she feels like her right leg gets weak.  She is not having any progressive weakness.  No bowel or bladder dysfunction fever chills sweats or unexpected weight loss.  Pain level is 4/10 in general.  She had an MRI of the lumbar spine on 07/14/2020 which is summarized below:    Impression:     1. Multilevel degenerative disc disease at L3-4 through L5-S1, as described above.  2. Mild circumferential disc bulge with superimposed central disc extrusion at L3-4, which contribute to moderate spinal canal stenosis.  3. Circumferential disc bulge with large central disc extrusion at L4-5, which contribute to severe spinal canal stenosis.  4. Mild circumferential disc bulge with superimposed right central to foraminal disc extrusion, which severely narrows the right lateral recess and contributes to mild-to-moderate spinal canal stenosis.  Possible impingement upon the descending right S1 nerve root.        Past Medical History:   Diagnosis Date    Anxiety     Depression     Insulin resistance     Knee derangement     Obesity     PCOS (polycystic ovarian syndrome)     Pinched nerve      Social History     Socioeconomic History    Marital status: Single     Spouse name: Not on file    Number of children: Not on file    Years of education: Not on file    Highest education level:  "Not on file   Occupational History    Not on file   Social Needs    Financial resource strain: Not very hard    Food insecurity     Worry: Never true     Inability: Never true    Transportation needs     Medical: No     Non-medical: No   Tobacco Use    Smoking status: Never Smoker    Smokeless tobacco: Never Used   Substance and Sexual Activity    Alcohol use: No     Frequency: Never     Binge frequency: Never    Drug use: No    Sexual activity: Never     Birth control/protection: OCP   Lifestyle    Physical activity     Days per week: 2 days     Minutes per session: 30 min    Stress: Rather much   Relationships    Social connections     Talks on phone: Three times a week     Gets together: More than three times a week     Attends Confucianism service: Not on file     Active member of club or organization: No     Attends meetings of clubs or organizations: Never     Relationship status: Never    Other Topics Concern    Not on file   Social History Narrative    Lives with parents and one sibling. In 9th grade, all As     Past Surgical History:   Procedure Laterality Date    KNEE ARTHROSCOPY, MEDIAL PATELLO FEMORAL LIGAMENT REPAIR      MOUTH SURGERY      TONSILLECTOMY       Family History   Problem Relation Age of Onset    Heart disease Father     Hypertension Father     Obesity Sister     Heart disease Paternal Grandfather      Vitals:    07/21/20 1344   BP: 135/84   Pulse: 99   Weight: 114.5 kg (252 lb 6.8 oz)   Height: 5' 6" (1.676 m)       Review of Systems   Constitutional: Negative for chills, diaphoresis, fatigue, fever and unexpected weight change.   HENT: Negative for trouble swallowing.    Eyes: Negative for visual disturbance.   Respiratory: Negative for shortness of breath.    Cardiovascular: Negative for chest pain.   Gastrointestinal: Negative for abdominal pain, constipation, nausea and vomiting.   Genitourinary: Negative for difficulty urinating.   Musculoskeletal: Negative for " arthralgias, back pain, gait problem, joint swelling, myalgias, neck pain and neck stiffness.   Neurological: Negative for dizziness, speech difficulty, weakness, light-headedness, numbness and headaches.          Objective:      Physical Exam  Neurological:      Mental Status: She is alert and oriented to person, place, and time.      Comments: She is awake and in no acute distress  No point tenderness or palpable masses about the lumbar spine  Forward flexion is to about 30° before she complains of pain at the lumbosacral junction.  Extension likewise causes discomfort at about 10°  She can heel and toe walk normally  Deep tendon reflexes +1 at both knees and trace at both ankles  Strength is normal in both lower extremities  Straight leg raise negative bilaterally             Assessment:       1. Lumbar disc herniation    2. Chronic right-sided low back pain with right-sided sciatica           Plan:     she has a nonfocal examination from a neurological standpoint and no historical red flags.  She has 3 rather large disc herniations at L3-4 L4-5 and L5-S1 with the worst being at L4-5.  Surprisingly she is not very symptomatic despite the large disc herniations.  Having said that I think it is in her best interest to get a surgical opinion.  She can follow up with me on an as-needed basis      Lumbar disc herniation  -     Ambulatory referral/consult to Orthopedics; Future; Expected date: 07/28/2020    Chronic right-sided low back pain with right-sided sciatica  -     Ambulatory referral[facility]consult to [facility]

## 2020-07-21 NOTE — LETTER
July 21, 2020      Jamila Smith MD  [address] [facility] [place] [address]           [facility]  [address] [place] [address] [address] [address]  Phone: [phone]  Fax: [phone]          Patient: Aysha Waters   MR Number: [id number]   YOB: 2000   Date of Visit: 7/21/2020       Dear Dr. Jamila Smith:    Thank you for referring Aysha Waters to me for evaluation. Attached you will find relevant portions of my assessment and plan of care.    If you have questions, please do not hesitate to call me. I look forward to following Aysha Waters along with you.    Sincerely,    Conrad Cheek MD    Enclosure  CC:  No Recipients    If you would like to receive this communication electronically, please contact [email or web address] or [phone] to request more information on [employer] Link access.    For providers and/or their staff who would like to refer a patient to [facility], please contact us through our one-stop-shop provider referral line, [facility], at [phone].    If you feel you have received this communication in error or would no longer like to receive these types of communications, please e-mail [email or web address]

## 2020-07-29 ENCOUNTER — HOSPITAL ENCOUNTER (EMERGENCY)
Facility: HOSPITAL | Age: 20
Discharge: HOME | End: 2020-07-29
Payer: COMMERCIAL

## 2020-07-29 VITALS
SYSTOLIC BLOOD PRESSURE: 143 MMHG | RESPIRATION RATE: 18 BRPM | TEMPERATURE: 98 F | BODY MASS INDEX: 39.37 KG/M2 | HEIGHT: 66 IN | WEIGHT: 245 LBS | DIASTOLIC BLOOD PRESSURE: 108 MMHG | OXYGEN SATURATION: 99 % | HEART RATE: 105 BPM

## 2020-07-29 DIAGNOSIS — H60.22 MALIGNANT OTITIS EXTERNA OF LEFT EAR, UNSPECIFIED CHRONICITY: Primary | ICD-10-CM

## 2020-07-29 PROCEDURE — 25000003 PHARM REV CODE 250: Performed by: INTERNAL MEDICINE

## 2020-07-29 PROCEDURE — 99283 EMERGENCY DEPT VISIT LOW MDM: CPT

## 2020-07-29 RX ORDER — SULFAMETHOXAZOLE AND TRIMETHOPRIM 800; 160 MG/1; MG/1
1 TABLET ORAL 2 TIMES DAILY
Qty: 14 TABLET | Refills: 0 | Status: SHIPPED | OUTPATIENT
Start: 2020-07-29 | End: 2020-07-31

## 2020-07-29 RX ORDER — NEOMYCIN SULFATE, POLYMYXIN B SULFATE AND HYDROCORTISONE 10; 3.5; 1 MG/ML; MG/ML; [USP'U]/ML
3 SUSPENSION/ DROPS AURICULAR (OTIC) EVERY 4 HOURS
Status: DISCONTINUED | OUTPATIENT
Start: 2020-07-29 | End: 2020-07-29 | Stop reason: HOSPADM

## 2020-07-29 RX ADMIN — NEOMYCIN SULFATE, POLYMYXIN B SULFATE AND HYDROCORTISONE 3 DROP: 10; 3.5; 1 SUSPENSION/ DROPS AURICULAR (OTIC) at 09:07

## 2020-07-30 NOTE — DISCHARGE INSTRUCTIONS
Start antibiotics only if ear pain is progressing with the next several days.  Use drops every 4-6 hours during the day.    Precautions with left ear on answering the phone with good hygiene

## 2020-07-30 NOTE — ED TRIAGE NOTES
"Pt to ER via POV, c/o "L Ear started hurting about 4-5 days ago. Now the pain seems to be unbearable at time."   "

## 2020-07-31 ENCOUNTER — OFFICE VISIT (OUTPATIENT)
Dept: ORTHOPEDICS | Facility: CLINIC | Age: 20
End: 2020-07-31
Payer: COMMERCIAL

## 2020-07-31 VITALS — WEIGHT: 245 LBS | DIASTOLIC BLOOD PRESSURE: 78 MMHG | BODY MASS INDEX: 39.54 KG/M2 | SYSTOLIC BLOOD PRESSURE: 128 MMHG

## 2020-07-31 DIAGNOSIS — M54.16 RIGHT LUMBAR RADICULITIS: ICD-10-CM

## 2020-07-31 DIAGNOSIS — M51.26 LUMBAR DISC HERNIATION: ICD-10-CM

## 2020-07-31 DIAGNOSIS — M48.062 SPINAL STENOSIS OF LUMBAR REGION WITH NEUROGENIC CLAUDICATION: Primary | ICD-10-CM

## 2020-07-31 PROCEDURE — 99203 OFFICE O/P NEW LOW 30 MIN: CPT | Mod: S$GLB,,, | Performed by: PHYSICIAN ASSISTANT

## 2020-07-31 PROCEDURE — 99203 PR OFFICE/OUTPT VISIT, NEW, LEVL III, 30-44 MIN: ICD-10-PCS | Mod: S$GLB,,, | Performed by: PHYSICIAN ASSISTANT

## 2020-07-31 RX ORDER — TRAMADOL HYDROCHLORIDE 50 MG/1
50 TABLET ORAL EVERY 6 HOURS PRN
Qty: 28 TABLET | Refills: 0 | Status: SHIPPED | OUTPATIENT
Start: 2020-07-31 | End: 2020-08-07

## 2020-07-31 RX ORDER — GABAPENTIN 300 MG/1
300 CAPSULE ORAL NIGHTLY
Qty: 30 CAPSULE | Refills: 1 | Status: SHIPPED | OUTPATIENT
Start: 2020-07-31 | End: 2021-06-23

## 2020-07-31 NOTE — PROGRESS NOTES
Subjective:       Patient ID: Aysha Waters is a 19 y.o. female.    Chief Complaint: Pain of the Lumbar Spine (Lumbar pain x 9 months. She hurt herself at [facility] when she was there for her knee. Pain radiates down right leg to foot with numbness and tingling. She was referred here by Dr Cheek. MRI done)      History of Present Illness  Patient is here for an initial evaluation.  Chief complaint is right-sided lumbar lumbosacral pain with intermittent right lower extremity radiculitis dysesthesia to the foot.  Her symptoms started about 9 months ago while she was in physical therapy for her right knee.  Patient has described a specific incidence where she was lying on her back with a weight on her left lower extremity when she felt the onset of pain in her back and her right leg.  Treatments for her symptoms have included physical therapy, IM corticosteroids, and medications.  None of this helped and Dr. Cheek ordered an MRI of the lumbar spine.    Current Medications  Current Outpatient Medications   Medication Sig Dispense Refill    ethynodiol-ethinyl estradiol (ZOVIA) 1-50 mg-mcg per tablet Take 1 tablet by mouth once daily.      FLUoxetine (PROZAC) 20 MG capsule Take 20 mg by mouth once daily.      hydroCHLOROthiazide (HYDRODIURIL) 12.5 MG Tab Take 1 tablet (12.5 mg total) by mouth once daily. 30 tablet 11    lisinopril (PRINIVIL,ZESTRIL) 40 MG tablet Take 1 tablet (40 mg total) by mouth once daily. 90 tablet 3    metFORMIN (GLUCOPHAGE) 500 MG tablet Take 1,000 mg by mouth 2 (two) times daily with meals.       methocarbamoL (ROBAXIN) 750 MG Tab Take 1-2 tablets by mouth every 8 hours as needed for muscle spasms 30 tablet 0    tretinoin (RETIN-A) 0.05 % cream Apply topically every evening. 45 g 0     No current facility-administered medications for this visit.        Allergies  Review of patient's allergies indicates:  No Known Allergies    Past Medical History  Past Medical History:   Diagnosis Date     Anxiety     Depression     Insulin resistance     Knee derangement     Obesity     PCOS (polycystic ovarian syndrome)     Pinched nerve        Surgical History  Past Surgical History:   Procedure Laterality Date    KNEE ARTHROSCOPY, MEDIAL PATELLO FEMORAL LIGAMENT REPAIR      MOUTH SURGERY      TONSILLECTOMY         Family History:   Family History   Problem Relation Age of Onset    Heart disease Father     Hypertension Father     Obesity Sister     Heart disease Paternal Grandfather        Social History:   Social History     Socioeconomic History    Marital status: Single     Spouse name: Not on file    Number of children: Not on file    Years of education: Not on file    Highest education level: Not on file   Occupational History    Not on file   Social Needs    Financial resource strain: Not very hard    Food insecurity     Worry: Never true     Inability: Never true    Transportation needs     Medical: No     Non-medical: No   Tobacco Use    Smoking status: Never Smoker    Smokeless tobacco: Never Used   Substance and Sexual Activity    Alcohol use: No     Frequency: Never     Binge frequency: Never    Drug use: No    Sexual activity: Never     Birth control/protection: OCP   Lifestyle    Physical activity     Days per week: 2 days     Minutes per session: 30 min    Stress: Rather much   Relationships    Social connections     Talks on phone: Three times a week     Gets together: More than three times a week     Attends Mosque service: Not on file     Active member of club or organization: No     Attends meetings of clubs or organizations: Never     Relationship status: Never    Other Topics Concern    Not on file   Social History Narrative    Lives with parents and one sibling. In 9th grade, all As       Hospitalization/Major Diagnostic Procedure:     Review of Systems     General/Constitutional:  Chills denies. Fatigue denies. Fever denies. Weight gain denies.  Weight loss denies.    Respiratory:  Shortness of breath denies.    Cardiovascular:  Chest pain denies.    Gastrointestinal:  Constipation denies. Diarrhea denies. Nausea denies. Vomiting denies.     Hematology:  Easy bruising denies. Prolonged bleeding denies.     Genitourinary:  Frequent urination denies. Pain in lower back denies. Painful urination denies.     Musculoskeletal:  See HPI for details    Skin:  Rash denies.    Neurologic:  Dizziness denies. Gait abnormalities denies. Seizures denies. Tingling/Numbess denies.    Psychiatric:  Anxiety denies. Depressed mood denies.     Objective:   Vital Signs:   Vitals:    07/31/20 0859   BP: 128/78        Physical Exam      General Examination:     Constitutional: The patient is alert and oriented to lace person and time. Mood is pleasant.     Head/Face: Normal facial features normal eyebrows    Eyes: Normal extraocular motion bilaterally    Lungs: Respirations are equal and unlabored    Gait is coordinated.    Cardiovascular: There are no swelling or varicosities present.    Lymphatic: Negative for adenopathy    Skin: Normal    Neurological: Level of consciousness normal. Oriented to place person and time and situation    Psychiatric: Oriented to time place person and situation    Lumbar exam:  Nonantalgic gait but mildly guarded.  Lumbar range of motion significantly limited in all planes of mobility.  Mild to moderate tenderness palpation right lumbar lumbosacral spine.  Bilateral lower extremities demonstrate full active range of motion, equal and symmetric DTRs at 0/hypoactive, and normal strength except for some very mild weakness of the right anterior tibialis and EHL 4+/5.  Negative straight leg raise maneuver bilateral.    XRAY Report/ Interpretation :  Lumbar MRI reviewed the patient in the office today demonstrates lack of the normal lordotic curvature.  L3-4 posterior midline disc bulge with moderate central stenosis.  L4-5 demonstrates a huge disc  herniation with severe central and foraminal stenosis.  L5-S1 demonstrates a right side predominant disc herniation with significant foraminal stenosis on that side.      Assessment:       1. Spinal stenosis of lumbar region with neurogenic claudication    2. Lumbar disc herniation    3. Right lumbar radiculitis        Plan:       Aysha was seen today for pain.    Diagnoses and all orders for this visit:    Spinal stenosis of lumbar region with neurogenic claudication  -     traMADoL (ULTRAM) 50 mg tablet; Take 1 tablet (50 mg total) by mouth every 6 (six) hours as needed for Pain.  -     gabapentin (NEURONTIN) 300 MG capsule; Take 1 capsule (300 mg total) by mouth every evening.    Lumbar disc herniation  -     Ambulatory referral/consult to Orthopedics  -     traMADoL (ULTRAM) 50 mg tablet; Take 1 tablet (50 mg total) by mouth every 6 (six) hours as needed for Pain.  -     gabapentin (NEURONTIN) 300 MG capsule; Take 1 capsule (300 mg total) by mouth every evening.    Right lumbar radiculitis  -     traMADoL (ULTRAM) 50 mg tablet; Take 1 tablet (50 mg total) by mouth every 6 (six) hours as needed for Pain.  -     gabapentin (NEURONTIN) 300 MG capsule; Take 1 capsule (300 mg total) by mouth every evening.         Follow up for f/u either Monday or Wedneday morning with Dr Chaves to discuss surg.    The patient states that she cannot tolerate living with her symptoms.  Her pain and symptomatology significantly hinders her ability to participate in activities of daily life and she is ready to proceed with surgery if this is an option.  I will have her follow up early next week to discuss surgery with Dr. Spain in person.    This note was created using Dragon voice recognition software that occasionally misinterpreted phrases or words.

## 2020-07-31 NOTE — LETTER
July 31, 2020      Conrad Cehek MD  [address] [place] [address] [address]  [facility] [address]           [facility]  [address] [facility] [address]  [facility] [address]  Phone: [phone]  Fax: [phone]          Patient: Aysha Waters   MR Number: [id number]   YOB: 2000   Date of Visit: 7/31/2020       Dear Dr. Conrad Cheek:    Thank you for referring Aysha Waters to me for evaluation. Attached you will find relevant portions of my assessment and plan of care.    If you have questions, please do not hesitate to call me. I look forward to following Aysha Waters along with you.    Sincerely,    OPAL Weston    Enclosure  CC:  No Recipients    If you would like to receive this communication electronically, please contact [email or web address] or [phone] to request more information on [employer] Link access.    For providers and/or their staff who would like to refer a patient to [facility], please contact us through our one-stop-shop provider referral line, [facility], at [phone].    If you feel you have received this communication in error or would no longer like to receive these types of communications, please e-mail [email or web address]

## 2020-08-01 NOTE — ED PROVIDER NOTES
Encounter Date: 7/29/2020       History     Chief Complaint   Patient presents with    Otalgia     Patient comes in complaining of ear pain.  States the is been hurting hurts when she swallows.  She has some mild congestion.  Swelling hurts on the right side.        Review of patient's allergies indicates:  No Known Allergies  Past Medical History:   Diagnosis Date    Anxiety     Depression     Insulin resistance     Knee derangement     Obesity     PCOS (polycystic ovarian syndrome)     Pinched nerve      Past Surgical History:   Procedure Laterality Date    KNEE ARTHROSCOPY, MEDIAL PATELLO FEMORAL LIGAMENT REPAIR      MOUTH SURGERY      TONSILLECTOMY       Family History   Problem Relation Age of Onset    Heart disease Father     Hypertension Father     Obesity Sister     Heart disease Paternal Grandfather      Social History     Tobacco Use    Smoking status: Never Smoker    Smokeless tobacco: Never Used   Substance Use Topics    Alcohol use: No     Frequency: Never     Binge frequency: Never    Drug use: No     Review of Systems   Constitutional: Negative for fever.   HENT: Positive for ear pain, sore throat and trouble swallowing.    Respiratory: Negative for shortness of breath.    Cardiovascular: Negative for chest pain.   Gastrointestinal: Negative for nausea.   Genitourinary: Negative for dysuria.   Musculoskeletal: Negative for back pain.   Skin: Negative for rash.   Neurological: Negative for weakness.   Hematological: Does not bruise/bleed easily.   All other systems reviewed and are negative.      Physical Exam     Initial Vitals [07/29/20 2053]   BP Pulse Resp Temp SpO2   (!) 143/108 105 18 98.4 °F (36.9 °C) 99 %      MAP       --         Physical Exam    Nursing note and vitals reviewed.  Constitutional: Vital signs are normal. She appears well-developed and well-nourished. She is active and cooperative.   HENT:   Head: Normocephalic and atraumatic.   Redness in the right tonsillar  base.  No lymphadenopathy.  Right TM is reddened with tenderness on traction   Eyes: Conjunctivae and lids are normal. Lids are everted and swept, no foreign bodies found.   Neck: Trachea normal, normal range of motion and full passive range of motion without pain. Neck supple.   Cardiovascular: Normal rate, regular rhythm, S1 normal, S2 normal, normal heart sounds, intact distal pulses and normal pulses.  No extrasystoles are present.    Abdominal: Soft. Normal appearance and bowel sounds are normal.   Musculoskeletal: Normal range of motion.   Neurological: She is alert. She has normal reflexes. GCS eye subscore is 4. GCS verbal subscore is 5. GCS motor subscore is 6.   Skin: Skin is warm, dry and intact. Capillary refill takes less than 2 seconds.   Psychiatric: She has a normal mood and affect. Her speech is normal and behavior is normal. Cognition and memory are normal.         ED Course   Procedures  Labs Reviewed - No data to display       Imaging Results    None                                          Clinical Impression:       ICD-10-CM ICD-9-CM   1. Malignant otitis externa of left ear, unspecified chronicity  H60.22 380.14             ED Disposition Condition    Discharge Stable        ED Prescriptions     Medication Sig Dispense Start Date End Date Auth. Provider    sulfamethoxazole-trimethoprim 800-160mg (BACTRIM DS) 800-160 mg Tab () Take 1 tablet by mouth 2 (two) times daily. for 7 days Patient not taking:  Reported on 2020 14 tablet 2020 Quentin Cardenas MD        Follow-up Information    None                                    Quentin Cardenas MD  20 1007

## 2020-08-03 ENCOUNTER — OFFICE VISIT (OUTPATIENT)
Dept: ORTHOPEDICS | Facility: CLINIC | Age: 20
End: 2020-08-03
Payer: COMMERCIAL

## 2020-08-03 VITALS — DIASTOLIC BLOOD PRESSURE: 70 MMHG | SYSTOLIC BLOOD PRESSURE: 106 MMHG | BODY MASS INDEX: 39.54 KG/M2 | WEIGHT: 245 LBS

## 2020-08-03 DIAGNOSIS — M48.062 SPINAL STENOSIS OF LUMBAR REGION WITH NEUROGENIC CLAUDICATION: ICD-10-CM

## 2020-08-03 DIAGNOSIS — Z03.818 ENCOUNTER FOR OBSERVATION FOR SUSPECTED EXPOSURE TO OTHER BIOLOGICAL AGENTS RULED OUT: ICD-10-CM

## 2020-08-03 DIAGNOSIS — M51.26 LUMBAR DISC HERNIATION: Primary | ICD-10-CM

## 2020-08-03 DIAGNOSIS — M54.16 RIGHT LUMBAR RADICULITIS: ICD-10-CM

## 2020-08-03 PROCEDURE — 99213 PR OFFICE/OUTPT VISIT, EST, LEVL III, 20-29 MIN: ICD-10-PCS | Mod: S$GLB,,, | Performed by: ORTHOPAEDIC SURGERY

## 2020-08-03 PROCEDURE — 99213 OFFICE O/P EST LOW 20 MIN: CPT | Mod: S$GLB,,, | Performed by: ORTHOPAEDIC SURGERY

## 2020-08-03 NOTE — H&P (VIEW-ONLY)
Subjective:       Patient ID: Aysha Waters is a 19 y.o. female.    Chief Complaint: Pain of the Lumbar Spine (Was here Friday and seen Ruben. Here to discuss lumbar surgery.)      History of Present Illness    Patient is here for an initial evaluation.  Chief complaint is right-sided lumbar lumbosacral pain with intermittent right lower extremity radiculitis dysesthesia to the foot.  Her symptoms started about 9 months ago while she was in physical therapy for her right knee.  Patient has described a specific incidence where she was lying on her back with a weight on her left lower extremity when she felt the onset of pain in her back and her right leg.  Treatments for her symptoms have included physical therapy, IM corticosteroids, and medications.  None of this helped and Dr. Cheek ordered an MRI of the lumbar spine.  Here for follow-up appointment.  She saw Conrad Aguilar several days ago and he referred her here today.  She has been having right leg pain and numbness for about 4 months no bowel or bladder incontinence and no saddle anesthesia.    Current Medications  Current Outpatient Medications   Medication Sig Dispense Refill    ethynodiol-ethinyl estradiol (ZOVIA) 1-50 mg-mcg per tablet Take 1 tablet by mouth once daily.      FLUoxetine (PROZAC) 20 MG capsule Take 20 mg by mouth once daily.      gabapentin (NEURONTIN) 300 MG capsule Take 1 capsule (300 mg total) by mouth every evening. 30 capsule 1    hydroCHLOROthiazide (HYDRODIURIL) 12.5 MG Tab Take 1 tablet (12.5 mg total) by mouth once daily. 30 tablet 11    lisinopril (PRINIVIL,ZESTRIL) 40 MG tablet Take 1 tablet (40 mg total) by mouth once daily. 90 tablet 3    metFORMIN (GLUCOPHAGE) 500 MG tablet Take 1,000 mg by mouth 2 (two) times daily with meals.       methocarbamoL (ROBAXIN) 750 MG Tab Take 1-2 tablets by mouth every 8 hours as needed for muscle spasms 30 tablet 0    traMADoL (ULTRAM) 50 mg tablet Take 1 tablet (50 mg total) by  mouth every 6 (six) hours as needed for Pain. 28 tablet 0    tretinoin (RETIN-A) 0.05 % cream Apply topically every evening. 45 g 0     No current facility-administered medications for this visit.        Allergies  Review of patient's allergies indicates:  No Known Allergies    Past Medical History  Past Medical History:   Diagnosis Date    Anxiety     Depression     Insulin resistance     Knee derangement     Obesity     PCOS (polycystic ovarian syndrome)     Pinched nerve        Surgical History  Past Surgical History:   Procedure Laterality Date    KNEE ARTHROSCOPY, MEDIAL PATELLO FEMORAL LIGAMENT REPAIR      MOUTH SURGERY      TONSILLECTOMY         Family History:   Family History   Problem Relation Age of Onset    Heart disease Father     Hypertension Father     Obesity Sister     Heart disease Paternal Grandfather        Social History:   Social History     Socioeconomic History    Marital status: Single     Spouse name: Not on file    Number of children: Not on file    Years of education: Not on file    Highest education level: Not on file   Occupational History    Not on file   Social Needs    Financial resource strain: Not very hard    Food insecurity     Worry: Never true     Inability: Never true    Transportation needs     Medical: No     Non-medical: No   Tobacco Use    Smoking status: Never Smoker    Smokeless tobacco: Never Used   Substance and Sexual Activity    Alcohol use: No     Frequency: Never     Binge frequency: Never    Drug use: No    Sexual activity: Never     Birth control/protection: OCP   Lifestyle    Physical activity     Days per week: 2 days     Minutes per session: 30 min    Stress: Rather much   Relationships    Social connections     Talks on phone: Three times a week     Gets together: More than three times a week     Attends Zoroastrianism service: Not on file     Active member of club or organization: No     Attends meetings of clubs or organizations:  Never     Relationship status: Never    Other Topics Concern    Not on file   Social History Narrative    Lives with parents and one sibling. In 9th grade, all As       Hospitalization/Major Diagnostic Procedure:     Review of Systems     General/Constitutional:  Chills denies. Fatigue denies. Fever denies. Weight gain denies. Weight loss denies.    Respiratory:  Shortness of breath denies.    Cardiovascular:  Chest pain denies.    Gastrointestinal:  Constipation denies. Diarrhea denies. Nausea denies. Vomiting denies.     Hematology:  Easy bruising denies. Prolonged bleeding denies.     Genitourinary:  Frequent urination denies. Pain in lower back denies. Painful urination denies.     Musculoskeletal:  See HPI for details    Skin:  Rash denies.    Neurologic:  Dizziness denies. Gait abnormalities denies. Seizures denies. Tingling/Numbess denies.    Psychiatric:  Anxiety denies. Depressed mood denies.     Objective:   Vital Signs:   Vitals:    08/03/20 0753   BP: 106/70        Physical Exam      General Examination:     Constitutional: The patient is alert and oriented to lace person and time. Mood is pleasant.     Head/Face: Normal facial features normal eyebrows    Eyes: Normal extraocular motion bilaterally    Lungs: Respirations are equal and unlabored    Gait is coordinated.    Cardiovascular: There are no swelling or varicosities present.    Lymphatic: Negative for adenopathy    Skin: Normal    Neurological: Level of consciousness normal. Oriented to place person and time and situation    Psychiatric: Oriented to time place person and situation    Physical examination shows positive straight leg raising on the right positive contralateral straight leg raising on the.  Subjective numbness noted L5 nerve root distribution on the right.  Motor exam normal in all major muscle groups both lower extremities.  Lumbar exam shows patient cannot stand erect has pain when doing so moderate spasm bilaterally for  S1 range of motion markedly restricted.    XRAY Report/ Interpretation :  Lumbar MRI study performed on July 14, 2020 was personally reviewed.  There is diminished signal intensity of the L4-5 L3-4 and L5-S1 disc space levels.  There is Ace moderate central disc herniation at the L3-4 level contributing to spinal stenosis there is a large disc herniation at the L4-5 level causing significant compression of thecal sac and severe spinal canal stenosis there is a right-sided paracentral large disc herniation at the L5-S1 level which does impinge on the right S1 nerve root and exiting foraminal L5 nerve root      Assessment:       1. Lumbar disc herniation    2. Right lumbar radiculitis    3. Spinal stenosis of lumbar region with neurogenic claudication        Plan:       Aysha was seen today for pain.    Diagnoses and all orders for this visit:    Lumbar disc herniation    Right lumbar radiculitis    Spinal stenosis of lumbar region with neurogenic claudication         Follow up for schedule surgery.    Treatment options were discussed regards to the nature of the spinal condition conservative pain interventional and surgical options were discussed in detail and the probability of success of the separate treatment options was discussed in detail the patient expressed a clear understanding of the treatment options would regards to surgery the procedure risks benefits complications and outcomes were discussed.  No guarantees were given regards to surgical outcome.  The risk associated with the spinal condition and an worsening of the condition was discussed with the patient expressed a thorough understanding.  The patient was warned about the possible development of cauda equina syndrome and its symptoms which include but are not limited to bowel or bladder dysfunction sexual dysfunction increasing pain increasing extremity numbness or weakness and saddle anesthesia.  The patient was advised to either contact me  immediately or to go to the nearest hospital emergency room if symptoms of cauda equina syndrome with to develop.  The patient expressed an understanding of these instructions.  The technical aspects of the surgery were discussed in detail with the patient today. The patient was able to verbalize an understanding. The procedure risk benefits alternatives and possible complications of the surgical procedure was discussed including expected outcomes. No guarantees were given regards to outcomes. Consent forms were will be signed at a later date. All questions regarding the surgery itself were answered. The patient wishes to proceed with surgery and will be scheduled. The patient may require preoperative medical clearance.  The patient would like to proceed with surgery all questions as by the patient and her mother who accompanied her were answered.  I have explained to him that this is a significant operation in view of the 3 level disc herniations in significantly large disc protrusions.  I do think she needs to have surgery it is unlikely that an epidural steroid injection series will resolve her complaints.  I have warned the patient of the risks of surgery including but not limited to hand infection neurologic deficit worsening of conditions cerebral spinal fluid leak necessity for further surgery or failure to relieve symptoms  We with planned L3-L4 and L5 laminectomies with partial discectomies of the L3-4 L4-5 and L5-S1 disc and decompression of the nerve roots exiting on the right side multiple levels need to be addressed as these disc herniations a quite large they would like to proceed with surgery on August 11, 2020.  Again I have warned him of the possible development of cauda equina symptoms and have discussed the symptoms with him and that he should immediately contact me or go to the hospital emergency room should she have any problems for urinary incontinence bowel incontinence or saddle anesthesia or  does other symptoms associated with cauda equina syndrome  Due to complexity of the patient's situation and assessment of her condition and reviewed diagnostic studies of believe that this service qualifies as a level 4    This note was created using Dragon voice recognition software that occasionally misinterpreted phrases or words.

## 2020-08-03 NOTE — PROGRESS NOTES
Subjective:       Patient ID: Aysha Waters is a 19 y.o. female.    Chief Complaint: Pain of the Lumbar Spine (Was here Friday and seen Ruben. Here to discuss lumbar surgery.)      History of Present Illness    Patient is here for an initial evaluation.  Chief complaint is right-sided lumbar lumbosacral pain with intermittent right lower extremity radiculitis dysesthesia to the foot.  Her symptoms started about 9 months ago while she was in physical therapy for her right knee.  Patient has described a specific incidence where she was lying on her back with a weight on her left lower extremity when she felt the onset of pain in her back and her right leg.  Treatments for her symptoms have included physical therapy, IM corticosteroids, and medications.  None of this helped and Dr. Cheek ordered an MRI of the lumbar spine.  Here for follow-up appointment.  She saw Conrad Aguilar several days ago and he referred her here today.  She has been having right leg pain and numbness for about 4 months no bowel or bladder incontinence and no saddle anesthesia.    Current Medications  Current Outpatient Medications   Medication Sig Dispense Refill    ethynodiol-ethinyl estradiol (ZOVIA) 1-50 mg-mcg per tablet Take 1 tablet by mouth once daily.      FLUoxetine (PROZAC) 20 MG capsule Take 20 mg by mouth once daily.      gabapentin (NEURONTIN) 300 MG capsule Take 1 capsule (300 mg total) by mouth every evening. 30 capsule 1    hydroCHLOROthiazide (HYDRODIURIL) 12.5 MG Tab Take 1 tablet (12.5 mg total) by mouth once daily. 30 tablet 11    lisinopril (PRINIVIL,ZESTRIL) 40 MG tablet Take 1 tablet (40 mg total) by mouth once daily. 90 tablet 3    metFORMIN (GLUCOPHAGE) 500 MG tablet Take 1,000 mg by mouth 2 (two) times daily with meals.       methocarbamoL (ROBAXIN) 750 MG Tab Take 1-2 tablets by mouth every 8 hours as needed for muscle spasms 30 tablet 0    traMADoL (ULTRAM) 50 mg tablet Take 1 tablet (50 mg total) by  mouth every 6 (six) hours as needed for Pain. 28 tablet 0    tretinoin (RETIN-A) 0.05 % cream Apply topically every evening. 45 g 0     No current facility-administered medications for this visit.        Allergies  Review of patient's allergies indicates:  No Known Allergies    Past Medical History  Past Medical History:   Diagnosis Date    Anxiety     Depression     Insulin resistance     Knee derangement     Obesity     PCOS (polycystic ovarian syndrome)     Pinched nerve        Surgical History  Past Surgical History:   Procedure Laterality Date    KNEE ARTHROSCOPY, MEDIAL PATELLO FEMORAL LIGAMENT REPAIR      MOUTH SURGERY      TONSILLECTOMY         Family History:   Family History   Problem Relation Age of Onset    Heart disease Father     Hypertension Father     Obesity Sister     Heart disease Paternal Grandfather        Social History:   Social History     Socioeconomic History    Marital status: Single     Spouse name: Not on file    Number of children: Not on file    Years of education: Not on file    Highest education level: Not on file   Occupational History    Not on file   Social Needs    Financial resource strain: Not very hard    Food insecurity     Worry: Never true     Inability: Never true    Transportation needs     Medical: No     Non-medical: No   Tobacco Use    Smoking status: Never Smoker    Smokeless tobacco: Never Used   Substance and Sexual Activity    Alcohol use: No     Frequency: Never     Binge frequency: Never    Drug use: No    Sexual activity: Never     Birth control/protection: OCP   Lifestyle    Physical activity     Days per week: 2 days     Minutes per session: 30 min    Stress: Rather much   Relationships    Social connections     Talks on phone: Three times a week     Gets together: More than three times a week     Attends Nondenominational service: Not on file     Active member of club or organization: No     Attends meetings of clubs or organizations:  Never     Relationship status: Never    Other Topics Concern    Not on file   Social History Narrative    Lives with parents and one sibling. In 9th grade, all As       Hospitalization/Major Diagnostic Procedure:     Review of Systems     General/Constitutional:  Chills denies. Fatigue denies. Fever denies. Weight gain denies. Weight loss denies.    Respiratory:  Shortness of breath denies.    Cardiovascular:  Chest pain denies.    Gastrointestinal:  Constipation denies. Diarrhea denies. Nausea denies. Vomiting denies.     Hematology:  Easy bruising denies. Prolonged bleeding denies.     Genitourinary:  Frequent urination denies. Pain in lower back denies. Painful urination denies.     Musculoskeletal:  See HPI for details    Skin:  Rash denies.    Neurologic:  Dizziness denies. Gait abnormalities denies. Seizures denies. Tingling/Numbess denies.    Psychiatric:  Anxiety denies. Depressed mood denies.     Objective:   Vital Signs:   Vitals:    08/03/20 0753   BP: 106/70        Physical Exam      General Examination:     Constitutional: The patient is alert and oriented to lace person and time. Mood is pleasant.     Head/Face: Normal facial features normal eyebrows    Eyes: Normal extraocular motion bilaterally    Lungs: Respirations are equal and unlabored    Gait is coordinated.    Cardiovascular: There are no swelling or varicosities present.    Lymphatic: Negative for adenopathy    Skin: Normal    Neurological: Level of consciousness normal. Oriented to place person and time and situation    Psychiatric: Oriented to time place person and situation    Physical examination shows positive straight leg raising on the right positive contralateral straight leg raising on the.  Subjective numbness noted L5 nerve root distribution on the right.  Motor exam normal in all major muscle groups both lower extremities.  Lumbar exam shows patient cannot stand erect has pain when doing so moderate spasm bilaterally for  S1 range of motion markedly restricted.    XRAY Report/ Interpretation :  Lumbar MRI study performed on July 14, 2020 was personally reviewed.  There is diminished signal intensity of the L4-5 L3-4 and L5-S1 disc space levels.  There is Ace moderate central disc herniation at the L3-4 level contributing to spinal stenosis there is a large disc herniation at the L4-5 level causing significant compression of thecal sac and severe spinal canal stenosis there is a right-sided paracentral large disc herniation at the L5-S1 level which does impinge on the right S1 nerve root and exiting foraminal L5 nerve root      Assessment:       1. Lumbar disc herniation    2. Right lumbar radiculitis    3. Spinal stenosis of lumbar region with neurogenic claudication        Plan:       Aysha was seen today for pain.    Diagnoses and all orders for this visit:    Lumbar disc herniation    Right lumbar radiculitis    Spinal stenosis of lumbar region with neurogenic claudication         Follow up for schedule surgery.    Treatment options were discussed regards to the nature of the spinal condition conservative pain interventional and surgical options were discussed in detail and the probability of success of the separate treatment options was discussed in detail the patient expressed a clear understanding of the treatment options would regards to surgery the procedure risks benefits complications and outcomes were discussed.  No guarantees were given regards to surgical outcome.  The risk associated with the spinal condition and an worsening of the condition was discussed with the patient expressed a thorough understanding.  The patient was warned about the possible development of cauda equina syndrome and its symptoms which include but are not limited to bowel or bladder dysfunction sexual dysfunction increasing pain increasing extremity numbness or weakness and saddle anesthesia.  The patient was advised to either contact me  immediately or to go to the nearest hospital emergency room if symptoms of cauda equina syndrome with to develop.  The patient expressed an understanding of these instructions.  The technical aspects of the surgery were discussed in detail with the patient today. The patient was able to verbalize an understanding. The procedure risk benefits alternatives and possible complications of the surgical procedure was discussed including expected outcomes. No guarantees were given regards to outcomes. Consent forms were will be signed at a later date. All questions regarding the surgery itself were answered. The patient wishes to proceed with surgery and will be scheduled. The patient may require preoperative medical clearance.  The patient would like to proceed with surgery all questions as by the patient and her mother who accompanied her were answered.  I have explained to him that this is a significant operation in view of the 3 level disc herniations in significantly large disc protrusions.  I do think she needs to have surgery it is unlikely that an epidural steroid injection series will resolve her complaints.  I have warned the patient of the risks of surgery including but not limited to hand infection neurologic deficit worsening of conditions cerebral spinal fluid leak necessity for further surgery or failure to relieve symptoms  We with planned L3-L4 and L5 laminectomies with partial discectomies of the L3-4 L4-5 and L5-S1 disc and decompression of the nerve roots exiting on the right side multiple levels need to be addressed as these disc herniations a quite large they would like to proceed with surgery on August 11, 2020.  Again I have warned him of the possible development of cauda equina symptoms and have discussed the symptoms with him and that he should immediately contact me or go to the hospital emergency room should she have any problems for urinary incontinence bowel incontinence or saddle anesthesia or  does other symptoms associated with cauda equina syndrome  Due to complexity of the patient's situation and assessment of her condition and reviewed diagnostic studies of believe that this service qualifies as a level 4    This note was created using Dragon voice recognition software that occasionally misinterpreted phrases or words.

## 2020-08-03 NOTE — PATIENT INSTRUCTIONS
Laminectomy  Vertebrae are the bones that make up the spine. Laminectomy is a surgery that removes the part of the vertebra called the lamina. This takes pressure off nerves in the low back and helps reduce symptoms. A similar surgery is called a laminotomy. For a laminotomy, only part of the lamina is removed.     The entire lamina is removed from the affected vertebra.   Before your surgery  Be sure to follow all of your doctor's instructions on preparing for surgery.  · Follow any directions you are given for not eating or drinking before surgery.  · If you take a daily medicine, ask if you should still take it the morning of surgery.  · If you take any blood-thinning medicines, such as aspirin, discuss them with your doctor at least a week before surgery.  · At the hospital, your temperature, pulse, breathing, and blood pressure will be checked.  · An IV or intravenous line may be started to provide fluids and medicines needed during surgery.  During your surgery  · Once in the operating room, you will be given anesthesia.  · After you are asleep, an incision is made near the center of your low back. The incision may be 2 to 6 inches long, depending on how many vertebrae are involved.  · During a laminectomy, the lamina, or bone that forms the back of the spinal canal, is removed from the affected vertebra. The opening created may be enough to take pressure off the nerve. If needed, your doctor can also remove any bone spurs or disk matter pressing on the nerve. After laminectomy, the opening in the spine is protected by the thick back muscles.  · Once the nerve is free of pressure, the incision is closed with stitches or surgical staples.  After your surgery  After surgery, youll be sent to the PACU or postanesthesia care unit. When you are fully awake, youll be moved to your room. The nurses will give you medicines to ease your pain. You may have a small tube called a catheter in your bladder. Soon,  healthcare providers will help you get up and moving. Youll also be shown how to keep your lungs clear.  When to call your healthcare provider  Once at home, call your provider if you have any of the symptoms below:  · Unusual redness, heat, or drainage at the incision site  · Increasing pain, numbness, or weakness in your leg  · Fever over 100.4°F (38°C)   Date Last Reviewed: 2/26/2016  © 5413-9069 [employer]. [address] [place] [address]. All rights reserved. This information is not intended as a substitute for professional medical care. Always follow your healthcare professional's instructions.

## 2020-08-05 ENCOUNTER — HOSPITAL ENCOUNTER (OUTPATIENT)
Dept: PREADMISSION TESTING | Facility: HOSPITAL | Age: 20
Discharge: HOME | End: 2020-08-05
Attending: ORTHOPAEDIC SURGERY
Payer: COMMERCIAL

## 2020-08-05 ENCOUNTER — HOSPITAL ENCOUNTER (OUTPATIENT)
Dept: RADIOLOGY | Facility: HOSPITAL | Age: 20
Discharge: HOME | End: 2020-08-05
Attending: ORTHOPAEDIC SURGERY
Payer: COMMERCIAL

## 2020-08-05 VITALS — WEIGHT: 240 LBS | BODY MASS INDEX: 38.57 KG/M2 | HEIGHT: 66 IN

## 2020-08-05 DIAGNOSIS — M48.062 SPINAL STENOSIS OF LUMBAR REGION WITH NEUROGENIC CLAUDICATION: ICD-10-CM

## 2020-08-05 DIAGNOSIS — M51.26 LUMBAR DISC HERNIATION: ICD-10-CM

## 2020-08-05 LAB
ALBUMIN SERPL BCP-MCNC: 4.2 G/DL (ref 3.5–5.2)
ALP SERPL-CCNC: 67 U/L (ref 55–135)
ALT SERPL W/O P-5'-P-CCNC: 17 U/L (ref 10–44)
ANION GAP SERPL CALC-SCNC: 12 MMOL/L (ref 8–16)
AST SERPL-CCNC: 18 U/L (ref 10–40)
BACTERIA #/AREA URNS HPF: ABNORMAL /HPF
BASOPHILS # BLD AUTO: 0.04 K/UL (ref 0–0.2)
BASOPHILS NFR BLD: 0.5 % (ref 0–1.9)
BILIRUB SERPL-MCNC: 0.3 MG/DL (ref 0.1–1)
BILIRUB UR QL STRIP: NEGATIVE
BUN SERPL-MCNC: 10 MG/DL (ref 6–20)
CALCIUM SERPL-MCNC: 9.8 MG/DL (ref 8.7–10.5)
CHLORIDE SERPL-SCNC: 102 MMOL/L (ref 95–110)
CLARITY UR: CLEAR
CO2 SERPL-SCNC: 23 MMOL/L (ref 23–29)
COLOR UR: YELLOW
CREAT SERPL-MCNC: 0.8 MG/DL (ref 0.5–1.4)
DIFFERENTIAL METHOD: ABNORMAL
EOSINOPHIL # BLD AUTO: 0.2 K/UL (ref 0–0.5)
EOSINOPHIL NFR BLD: 2 % (ref 0–8)
ERYTHROCYTE [DISTWIDTH] IN BLOOD BY AUTOMATED COUNT: 13.6 % (ref 11.5–14.5)
EST. GFR  (AFRICAN AMERICAN): >60 ML/MIN/1.73 M^2
EST. GFR  (NON AFRICAN AMERICAN): >60 ML/MIN/1.73 M^2
GLUCOSE SERPL-MCNC: 113 MG/DL (ref 70–110)
GLUCOSE UR QL STRIP: NEGATIVE
HCT VFR BLD AUTO: 39.7 % (ref 37–48.5)
HGB BLD-MCNC: 12.4 G/DL (ref 12–16)
HGB UR QL STRIP: ABNORMAL
IMM GRANULOCYTES # BLD AUTO: 0.04 K/UL (ref 0–0.04)
IMM GRANULOCYTES NFR BLD AUTO: 0.5 % (ref 0–0.5)
KETONES UR QL STRIP: NEGATIVE
LEUKOCYTE ESTERASE UR QL STRIP: NEGATIVE
LYMPHOCYTES # BLD AUTO: 3 K/UL (ref 1–4.8)
LYMPHOCYTES NFR BLD: 39.4 % (ref 18–48)
MCH RBC QN AUTO: 26.2 PG (ref 27–31)
MCHC RBC AUTO-ENTMCNC: 31.2 G/DL (ref 32–36)
MCV RBC AUTO: 84 FL (ref 82–98)
MICROSCOPIC COMMENT: ABNORMAL
MONOCYTES # BLD AUTO: 0.4 K/UL (ref 0.3–1)
MONOCYTES NFR BLD: 5.7 % (ref 4–15)
NEUTROPHILS # BLD AUTO: 3.9 K/UL (ref 1.8–7.7)
NEUTROPHILS NFR BLD: 51.9 % (ref 38–73)
NITRITE UR QL STRIP: NEGATIVE
NRBC BLD-RTO: 0 /100 WBC
PH UR STRIP: 6 [PH] (ref 5–8)
PLATELET # BLD AUTO: 359 K/UL (ref 150–350)
PMV BLD AUTO: 9.9 FL (ref 9.2–12.9)
POTASSIUM SERPL-SCNC: 4.2 MMOL/L (ref 3.5–5.1)
PROT SERPL-MCNC: 7.5 G/DL (ref 6–8.4)
PROT UR QL STRIP: NEGATIVE
RBC # BLD AUTO: 4.73 M/UL (ref 4–5.4)
RBC #/AREA URNS HPF: 14 /HPF (ref 0–4)
SODIUM SERPL-SCNC: 137 MMOL/L (ref 136–145)
SP GR UR STRIP: >=1.03 (ref 1–1.03)
SQUAMOUS #/AREA URNS HPF: 6 /HPF
URN SPEC COLLECT METH UR: ABNORMAL
UROBILINOGEN UR STRIP-ACNC: NEGATIVE EU/DL
WBC # BLD AUTO: 7.58 K/UL (ref 3.9–12.7)
WBC #/AREA URNS HPF: 3 /HPF (ref 0–5)

## 2020-08-05 PROCEDURE — 36415 COLL VENOUS BLD VENIPUNCTURE: CPT

## 2020-08-05 PROCEDURE — 99900104 DSU ONLY-NO CHARGE-EA ADD'L HR (STAT)

## 2020-08-05 PROCEDURE — 71046 XR CHEST PA AND LATERAL: ICD-10-PCS | Mod: 26,,, | Performed by: RADIOLOGY

## 2020-08-05 PROCEDURE — 80053 COMPREHEN METABOLIC PANEL: CPT

## 2020-08-05 PROCEDURE — 93005 ELECTROCARDIOGRAM TRACING: CPT

## 2020-08-05 PROCEDURE — 81000 URINALYSIS NONAUTO W/SCOPE: CPT

## 2020-08-05 PROCEDURE — 71046 X-RAY EXAM CHEST 2 VIEWS: CPT | Mod: TC,FY

## 2020-08-05 PROCEDURE — 99900103 DSU ONLY-NO CHARGE-INITIAL HR (STAT)

## 2020-08-05 PROCEDURE — 85025 COMPLETE CBC W/AUTO DIFF WBC: CPT

## 2020-08-05 PROCEDURE — 71046 X-RAY EXAM CHEST 2 VIEWS: CPT | Mod: 26,,, | Performed by: RADIOLOGY

## 2020-08-05 NOTE — DISCHARGE INSTRUCTIONS
To confirm, Your doctor has instructed you that surgery is scheduled for:  8/11/20  Ph[place]ong  [phone]    Covid test  8/8/20    Please report to [facility], Registration the morning of surgery. You must check-in and receive a wristband before going to your procedure.    Pre-Op will call the afternoon prior to surgery between 1:00 and 6:00 PM with the final arrival time.  Phone number: [phone]    PLEASE NOTE:  The surgery schedule has many variables which may affect the time of your surgery case.  Family members should be available if your surgery time changes.  Plan to be here the day of your procedure between 4-6 hours.    MEDICATIONS:  TAKE ONLY THESE MEDICATIONS WITH A SMALL SIP OF WATER THE MORNING OF YOUR PROCEDURE:    Zovia, Prozac, Gabapentin, Pain Pill-if needed.    DO NOT TAKE THESE MEDICATIONS 5-7 DAYS PRIOR to your procedure or per your surgeon's request: ASPIRIN, ALEVE, ADVIL, IBUPROFEN, FISH OIL VITAMIN E, HERBALS  (May take Tylenol)                                               NO HCTZ, OR LISINOPRIL AM OF SURGERY                                               NO METFORMIN PM OR AM BEFORE SURGERY    ONLY if you are prescribed any types of blood thinners such as:  Aspirin, Coumadin, Plavix, Pradaxa, Xarelto, Aggrenox, Effient, Eliquis, Savasya, Brilinta, or any other, ask your surgeon whether you should stop taking them and how long before surgery you should stop.  You may also need to verify with the prescribing physician if it is ok to stop your medication.      INSTRUCTIONS IMPORTANT!!  · Do not eat or drink anything between midnight and the time of your procedure- this includes gum, mints, and candy.  · Do not smoke or drink alcoholic beverages 24 hours prior to your procedure.  · Shower the night before AND the morning of your procedure with a Chlorhexidine wash such as Hibiclens or Dial antibacterial soap from the neck down.  Do not get it on your face or in your eyes.   You may use your own shampoo and face wash. This helps your skin to be as bacteria free as possible.    · If you wear contact lenses, dentures, hearing aids or glasses, bring a container to put them in during surgery and give to a family member for safe keeping.  Please leave all jewelry, piercing's and valuables at home.   · DO NOT remove hair from the surgery site.  Do not shave the incision site unless you are given specific instructions to do so.    · ONLY if you have been diagnosed with sleep apnea please bring your C-PAP machine.  · ONLY if you wear home oxygen please bring your portable oxygen tank the day of your procedure.  · ONLY if you have a history of OPEN HEART SURGERY you will need a clearance from your Cardiologist per Anesthesia.      · ONLY for patients requiring bowel prep, written instructions will be given by your doctor's office.  · ONLY if you have a neuro stimulator, please bring the controller with you the morning of surgery  · ONLY if a type and screen test is needed before surgery, please return:  · If your doctor has scheduled you for an overnight stay, bring a small overnight bag with any personal items you need.  · Make arrangements in advance for transportation home by a responsible adult.  It is not safe to drive a vehicle during the 24 hours after anesthesia.      · Visiting hours are 10:00AM to 8:30PM.  For the safety of all patients, visitors under the age of 12 are not allowed above the first floor of the hospital.    · All [facility] facilities and properties are tobacco free.  Smoking is NOT allowed.       If you have any questions about these instructions, call Pre-Op Admit  Nursing at [phone] or the Pre-Op Day Surgery Unit at [phone].

## 2020-08-08 ENCOUNTER — LAB VISIT (OUTPATIENT)
Dept: PRIMARY CARE CLINIC | Facility: CLINIC | Age: 20
End: 2020-08-08
Payer: COMMERCIAL

## 2020-08-08 DIAGNOSIS — Z03.818 ENCOUNTER FOR OBSERVATION FOR SUSPECTED EXPOSURE TO OTHER BIOLOGICAL AGENTS RULED OUT: ICD-10-CM

## 2020-08-08 PROCEDURE — U0003 INFECTIOUS AGENT DETECTION BY NUCLEIC ACID (DNA OR RNA); SEVERE ACUTE RESPIRATORY SYNDROME CORONAVIRUS 2 (SARS-COV-2) (CORONAVIRUS DISEASE [COVID-19]), AMPLIFIED PROBE TECHNIQUE, MAKING USE OF HIGH THROUGHPUT TECHNOLOGIES AS DESCRIBED BY CMS-2020-01-R: HCPCS

## 2020-08-09 LAB — SARS-COV-2 RNA RESP QL NAA+PROBE: NOT DETECTED

## 2020-08-10 ENCOUNTER — ANESTHESIA EVENT (OUTPATIENT)
Dept: SURGERY | Facility: HOSPITAL | Age: 20
DRG: 908 | End: 2020-08-10
Payer: COMMERCIAL

## 2020-08-11 ENCOUNTER — ANESTHESIA (OUTPATIENT)
Dept: SURGERY | Facility: HOSPITAL | Age: 20
DRG: 908 | End: 2020-08-11
Payer: COMMERCIAL

## 2020-08-11 ENCOUNTER — HOSPITAL ENCOUNTER (INPATIENT)
Facility: HOSPITAL | Age: 20
LOS: 2 days | Discharge: HOME HEALTH | DRG: 908 | End: 2020-08-14
Attending: ORTHOPAEDIC SURGERY | Admitting: INTERNAL MEDICINE
Payer: COMMERCIAL

## 2020-08-11 DIAGNOSIS — G96.00 CSF LEAK: ICD-10-CM

## 2020-08-11 DIAGNOSIS — M48.062 SPINAL STENOSIS OF LUMBAR REGION WITH NEUROGENIC CLAUDICATION: ICD-10-CM

## 2020-08-11 DIAGNOSIS — M51.26 RUPTURED LUMBAR INTERVERTEBRAL DISC: Primary | ICD-10-CM

## 2020-08-11 DIAGNOSIS — M51.26 LUMBAR DISC HERNIATION: ICD-10-CM

## 2020-08-11 LAB
ANION GAP SERPL CALC-SCNC: 10 MMOL/L (ref 8–16)
B-HCG UR QL: NEGATIVE
BASOPHILS # BLD AUTO: 0.06 K/UL (ref 0–0.2)
BASOPHILS NFR BLD: 0.5 % (ref 0–1.9)
BUN SERPL-MCNC: 7 MG/DL (ref 6–20)
CALCIUM SERPL-MCNC: 8 MG/DL (ref 8.7–10.5)
CHLORIDE SERPL-SCNC: 106 MMOL/L (ref 95–110)
CO2 SERPL-SCNC: 23 MMOL/L (ref 23–29)
CREAT SERPL-MCNC: 0.6 MG/DL (ref 0.5–1.4)
CTP QC/QA: YES
DIFFERENTIAL METHOD: ABNORMAL
EOSINOPHIL # BLD AUTO: 0.1 K/UL (ref 0–0.5)
EOSINOPHIL NFR BLD: 1.1 % (ref 0–8)
ERYTHROCYTE [DISTWIDTH] IN BLOOD BY AUTOMATED COUNT: 13 % (ref 11.5–14.5)
EST. GFR  (AFRICAN AMERICAN): >60 ML/MIN/1.73 M^2
EST. GFR  (NON AFRICAN AMERICAN): >60 ML/MIN/1.73 M^2
GLUCOSE SERPL-MCNC: 123 MG/DL (ref 70–110)
HCT VFR BLD AUTO: 31.8 % (ref 37–48.5)
HGB BLD-MCNC: 10.2 G/DL (ref 12–16)
IMM GRANULOCYTES # BLD AUTO: 0.08 K/UL (ref 0–0.04)
IMM GRANULOCYTES NFR BLD AUTO: 0.6 % (ref 0–0.5)
LYMPHOCYTES # BLD AUTO: 2.7 K/UL (ref 1–4.8)
LYMPHOCYTES NFR BLD: 21.3 % (ref 18–48)
MCH RBC QN AUTO: 26.5 PG (ref 27–31)
MCHC RBC AUTO-ENTMCNC: 32.1 G/DL (ref 32–36)
MCV RBC AUTO: 83 FL (ref 82–98)
MONOCYTES # BLD AUTO: 0.4 K/UL (ref 0.3–1)
MONOCYTES NFR BLD: 3 % (ref 4–15)
NEUTROPHILS # BLD AUTO: 9.4 K/UL (ref 1.8–7.7)
NEUTROPHILS NFR BLD: 73.5 % (ref 38–73)
NRBC BLD-RTO: 0 /100 WBC
PLATELET # BLD AUTO: 278 K/UL (ref 150–350)
PMV BLD AUTO: 9.6 FL (ref 9.2–12.9)
POTASSIUM SERPL-SCNC: 4.1 MMOL/L (ref 3.5–5.1)
RBC # BLD AUTO: 3.85 M/UL (ref 4–5.4)
SODIUM SERPL-SCNC: 139 MMOL/L (ref 136–145)
WBC # BLD AUTO: 12.83 K/UL (ref 3.9–12.7)

## 2020-08-11 PROCEDURE — 63600175 PHARM REV CODE 636 W HCPCS: Performed by: ANESTHESIOLOGY

## 2020-08-11 PROCEDURE — 27000221 HC OXYGEN, UP TO 24 HOURS

## 2020-08-11 PROCEDURE — 88311 DECALCIFY TISSUE: CPT | Mod: 26,,, | Performed by: PATHOLOGY

## 2020-08-11 PROCEDURE — C9399 UNCLASSIFIED DRUGS OR BIOLOG: HCPCS | Performed by: ORTHOPAEDIC SURGERY

## 2020-08-11 PROCEDURE — D9220A PRA ANESTHESIA: Mod: CRNA,,, | Performed by: NURSE ANESTHETIST, CERTIFIED REGISTERED

## 2020-08-11 PROCEDURE — D9220A PRA ANESTHESIA: ICD-10-PCS | Mod: ANES,,, | Performed by: ANESTHESIOLOGY

## 2020-08-11 PROCEDURE — 99900035 HC TECH TIME PER 15 MIN (STAT)

## 2020-08-11 PROCEDURE — 88311 DECALCIFY TISSUE: CPT | Performed by: PATHOLOGY

## 2020-08-11 PROCEDURE — D9220A PRA ANESTHESIA: ICD-10-PCS | Mod: CRNA,,, | Performed by: NURSE ANESTHETIST, CERTIFIED REGISTERED

## 2020-08-11 PROCEDURE — 25000003 PHARM REV CODE 250: Performed by: ANESTHESIOLOGY

## 2020-08-11 PROCEDURE — 81025 URINE PREGNANCY TEST: CPT | Performed by: ORTHOPAEDIC SURGERY

## 2020-08-11 PROCEDURE — 63600175 PHARM REV CODE 636 W HCPCS: Performed by: PHYSICIAN ASSISTANT

## 2020-08-11 PROCEDURE — D9220A PRA ANESTHESIA: Mod: ANES,,, | Performed by: ANESTHESIOLOGY

## 2020-08-11 PROCEDURE — 36415 COLL VENOUS BLD VENIPUNCTURE: CPT

## 2020-08-11 PROCEDURE — 25000003 PHARM REV CODE 250: Performed by: ORTHOPAEDIC SURGERY

## 2020-08-11 PROCEDURE — 63600175 PHARM REV CODE 636 W HCPCS: Performed by: ORTHOPAEDIC SURGERY

## 2020-08-11 PROCEDURE — 37000009 HC ANESTHESIA EA ADD 15 MINS: Performed by: ORTHOPAEDIC SURGERY

## 2020-08-11 PROCEDURE — 63600175 PHARM REV CODE 636 W HCPCS

## 2020-08-11 PROCEDURE — 37000008 HC ANESTHESIA 1ST 15 MINUTES: Performed by: ORTHOPAEDIC SURGERY

## 2020-08-11 PROCEDURE — 88304 PR  SURG PATH,LEVEL III: ICD-10-PCS | Mod: 26,,, | Performed by: PATHOLOGY

## 2020-08-11 PROCEDURE — 94770 HC EXHALED C02 TEST: CPT

## 2020-08-11 PROCEDURE — 36000711: Performed by: ORTHOPAEDIC SURGERY

## 2020-08-11 PROCEDURE — 88304 TISSUE EXAM BY PATHOLOGIST: CPT | Performed by: PATHOLOGY

## 2020-08-11 PROCEDURE — 71000033 HC RECOVERY, INTIAL HOUR: Performed by: ORTHOPAEDIC SURGERY

## 2020-08-11 PROCEDURE — 88311 PR  DECALCIFY TISSUE: ICD-10-PCS | Mod: 26,,, | Performed by: PATHOLOGY

## 2020-08-11 PROCEDURE — 85025 COMPLETE CBC W/AUTO DIFF WBC: CPT

## 2020-08-11 PROCEDURE — 71000039 HC RECOVERY, EACH ADD'L HOUR: Performed by: ORTHOPAEDIC SURGERY

## 2020-08-11 PROCEDURE — 25000003 PHARM REV CODE 250: Performed by: PHYSICIAN ASSISTANT

## 2020-08-11 PROCEDURE — 94761 N-INVAS EAR/PLS OXIMETRY MLT: CPT

## 2020-08-11 PROCEDURE — 94799 UNLISTED PULMONARY SVC/PX: CPT

## 2020-08-11 PROCEDURE — 25000003 PHARM REV CODE 250: Performed by: NURSE ANESTHETIST, CERTIFIED REGISTERED

## 2020-08-11 PROCEDURE — 88304 TISSUE EXAM BY PATHOLOGIST: CPT | Mod: 26,,, | Performed by: PATHOLOGY

## 2020-08-11 PROCEDURE — 63600175 PHARM REV CODE 636 W HCPCS: Performed by: NURSE ANESTHETIST, CERTIFIED REGISTERED

## 2020-08-11 PROCEDURE — 80048 BASIC METABOLIC PNL TOTAL CA: CPT

## 2020-08-11 PROCEDURE — 99900104 DSU ONLY-NO CHARGE-EA ADD'L HR (STAT): Performed by: ORTHOPAEDIC SURGERY

## 2020-08-11 PROCEDURE — 99900103 DSU ONLY-NO CHARGE-INITIAL HR (STAT): Performed by: ORTHOPAEDIC SURGERY

## 2020-08-11 PROCEDURE — 36000710: Performed by: ORTHOPAEDIC SURGERY

## 2020-08-11 RX ORDER — ACETAMINOPHEN 10 MG/ML
1000 INJECTION, SOLUTION INTRAVENOUS EVERY 8 HOURS
Status: COMPLETED | OUTPATIENT
Start: 2020-08-11 | End: 2020-08-12

## 2020-08-11 RX ORDER — ONDANSETRON 2 MG/ML
INJECTION INTRAMUSCULAR; INTRAVENOUS
Status: DISCONTINUED | OUTPATIENT
Start: 2020-08-11 | End: 2020-08-11

## 2020-08-11 RX ORDER — DOCUSATE SODIUM 100 MG/1
100 CAPSULE, LIQUID FILLED ORAL DAILY
Status: DISCONTINUED | OUTPATIENT
Start: 2020-08-11 | End: 2020-08-14 | Stop reason: HOSPADM

## 2020-08-11 RX ORDER — CEFAZOLIN SODIUM 2 G/50ML
2 SOLUTION INTRAVENOUS
Status: COMPLETED | OUTPATIENT
Start: 2020-08-11 | End: 2020-08-11

## 2020-08-11 RX ORDER — PROPOFOL 10 MG/ML
VIAL (ML) INTRAVENOUS
Status: DISCONTINUED | OUTPATIENT
Start: 2020-08-11 | End: 2020-08-11

## 2020-08-11 RX ORDER — SUCCINYLCHOLINE CHLORIDE 20 MG/ML
INJECTION INTRAMUSCULAR; INTRAVENOUS
Status: DISCONTINUED | OUTPATIENT
Start: 2020-08-11 | End: 2020-08-11

## 2020-08-11 RX ORDER — HYDROMORPHONE HYDROCHLORIDE 2 MG/ML
2 INJECTION, SOLUTION INTRAMUSCULAR; INTRAVENOUS; SUBCUTANEOUS
Status: DISCONTINUED | OUTPATIENT
Start: 2020-08-11 | End: 2020-08-14 | Stop reason: HOSPADM

## 2020-08-11 RX ORDER — ACETAMINOPHEN 325 MG/1
650 TABLET ORAL EVERY 6 HOURS PRN
Status: DISCONTINUED | OUTPATIENT
Start: 2020-08-12 | End: 2020-08-14 | Stop reason: HOSPADM

## 2020-08-11 RX ORDER — MIDAZOLAM HYDROCHLORIDE 1 MG/ML
INJECTION, SOLUTION INTRAMUSCULAR; INTRAVENOUS
Status: DISCONTINUED | OUTPATIENT
Start: 2020-08-11 | End: 2020-08-11

## 2020-08-11 RX ORDER — HYDROMORPHONE HCL IN 0.9% NACL 6 MG/30 ML
PATIENT CONTROLLED ANALGESIA SYRINGE INTRAVENOUS CONTINUOUS
Status: DISCONTINUED | OUTPATIENT
Start: 2020-08-11 | End: 2020-08-14

## 2020-08-11 RX ORDER — METHOCARBAMOL 500 MG/1
500 TABLET, FILM COATED ORAL 4 TIMES DAILY
Status: DISCONTINUED | OUTPATIENT
Start: 2020-08-11 | End: 2020-08-14 | Stop reason: HOSPADM

## 2020-08-11 RX ORDER — HYDROMORPHONE HYDROCHLORIDE 2 MG/ML
0.2 INJECTION, SOLUTION INTRAMUSCULAR; INTRAVENOUS; SUBCUTANEOUS EVERY 5 MIN PRN
Status: COMPLETED | OUTPATIENT
Start: 2020-08-11 | End: 2020-08-11

## 2020-08-11 RX ORDER — CEFAZOLIN SODIUM 2 G/50ML
2 SOLUTION INTRAVENOUS
Status: COMPLETED | OUTPATIENT
Start: 2020-08-11 | End: 2020-08-12

## 2020-08-11 RX ORDER — ACETAMINOPHEN 325 MG/1
650 TABLET ORAL EVERY 6 HOURS PRN
Status: DISCONTINUED | OUTPATIENT
Start: 2020-08-12 | End: 2020-08-11

## 2020-08-11 RX ORDER — PHENYLEPHRINE HYDROCHLORIDE 10 MG/ML
INJECTION INTRAVENOUS
Status: DISCONTINUED | OUTPATIENT
Start: 2020-08-11 | End: 2020-08-11

## 2020-08-11 RX ORDER — GABAPENTIN 300 MG/1
300 CAPSULE ORAL NIGHTLY
Status: DISCONTINUED | OUTPATIENT
Start: 2020-08-11 | End: 2020-08-14 | Stop reason: HOSPADM

## 2020-08-11 RX ORDER — LIDOCAINE HYDROCHLORIDE 10 MG/ML
1 INJECTION, SOLUTION EPIDURAL; INFILTRATION; INTRACAUDAL; PERINEURAL ONCE
Status: DISCONTINUED | OUTPATIENT
Start: 2020-08-11 | End: 2020-08-11 | Stop reason: HOSPADM

## 2020-08-11 RX ORDER — LISINOPRIL 40 MG/1
40 TABLET ORAL DAILY
Status: DISCONTINUED | OUTPATIENT
Start: 2020-08-11 | End: 2020-08-13

## 2020-08-11 RX ORDER — OXYCODONE HYDROCHLORIDE 5 MG/1
5 TABLET ORAL EVERY 4 HOURS PRN
Status: DISCONTINUED | OUTPATIENT
Start: 2020-08-11 | End: 2020-08-14 | Stop reason: HOSPADM

## 2020-08-11 RX ORDER — OXYCODONE AND ACETAMINOPHEN 10; 325 MG/1; MG/1
1 TABLET ORAL EVERY 4 HOURS PRN
Qty: 40 TABLET | Refills: 0 | Status: SHIPPED | OUTPATIENT
Start: 2020-08-11 | End: 2020-08-18

## 2020-08-11 RX ORDER — NALOXONE HCL 0.4 MG/ML
0.02 VIAL (ML) INJECTION
Status: DISCONTINUED | OUTPATIENT
Start: 2020-08-11 | End: 2020-08-14 | Stop reason: HOSPADM

## 2020-08-11 RX ORDER — KETAMINE HYDROCHLORIDE 10 MG/ML
INJECTION, SOLUTION INTRAMUSCULAR; INTRAVENOUS
Status: DISCONTINUED | OUTPATIENT
Start: 2020-08-11 | End: 2020-08-11

## 2020-08-11 RX ORDER — ROCURONIUM BROMIDE 10 MG/ML
INJECTION, SOLUTION INTRAVENOUS
Status: DISCONTINUED | OUTPATIENT
Start: 2020-08-11 | End: 2020-08-11

## 2020-08-11 RX ORDER — NEOSTIGMINE METHYLSULFATE 1 MG/ML
INJECTION, SOLUTION INTRAVENOUS
Status: DISCONTINUED | OUTPATIENT
Start: 2020-08-11 | End: 2020-08-11

## 2020-08-11 RX ORDER — AMOXICILLIN 250 MG
2 CAPSULE ORAL NIGHTLY PRN
Status: DISCONTINUED | OUTPATIENT
Start: 2020-08-11 | End: 2020-08-14 | Stop reason: HOSPADM

## 2020-08-11 RX ORDER — SODIUM CHLORIDE, SODIUM LACTATE, POTASSIUM CHLORIDE, CALCIUM CHLORIDE 600; 310; 30; 20 MG/100ML; MG/100ML; MG/100ML; MG/100ML
125 INJECTION, SOLUTION INTRAVENOUS CONTINUOUS
Status: DISCONTINUED | OUTPATIENT
Start: 2020-08-11 | End: 2020-08-13

## 2020-08-11 RX ORDER — OXYCODONE HYDROCHLORIDE 10 MG/1
10 TABLET ORAL EVERY 4 HOURS PRN
Status: DISCONTINUED | OUTPATIENT
Start: 2020-08-11 | End: 2020-08-14 | Stop reason: HOSPADM

## 2020-08-11 RX ORDER — BISACODYL 10 MG
10 SUPPOSITORY, RECTAL RECTAL DAILY
Status: DISCONTINUED | OUTPATIENT
Start: 2020-08-11 | End: 2020-08-14 | Stop reason: HOSPADM

## 2020-08-11 RX ORDER — DIPHENHYDRAMINE HCL 25 MG
50 CAPSULE ORAL EVERY 6 HOURS PRN
Status: DISCONTINUED | OUTPATIENT
Start: 2020-08-11 | End: 2020-08-14 | Stop reason: HOSPADM

## 2020-08-11 RX ORDER — GLYCOPYRROLATE 0.2 MG/ML
INJECTION INTRAMUSCULAR; INTRAVENOUS
Status: DISCONTINUED | OUTPATIENT
Start: 2020-08-11 | End: 2020-08-11

## 2020-08-11 RX ORDER — ACETAMINOPHEN 10 MG/ML
1000 INJECTION, SOLUTION INTRAVENOUS ONCE
Status: DISCONTINUED | OUTPATIENT
Start: 2020-08-11 | End: 2020-08-11

## 2020-08-11 RX ORDER — DEXAMETHASONE SODIUM PHOSPHATE 4 MG/ML
INJECTION, SOLUTION INTRA-ARTICULAR; INTRALESIONAL; INTRAMUSCULAR; INTRAVENOUS; SOFT TISSUE
Status: DISCONTINUED | OUTPATIENT
Start: 2020-08-11 | End: 2020-08-11

## 2020-08-11 RX ORDER — ACETAMINOPHEN 10 MG/ML
1000 INJECTION, SOLUTION INTRAVENOUS EVERY 8 HOURS
Status: DISCONTINUED | OUTPATIENT
Start: 2020-08-11 | End: 2020-08-11

## 2020-08-11 RX ORDER — FENTANYL CITRATE 50 UG/ML
INJECTION, SOLUTION INTRAMUSCULAR; INTRAVENOUS
Status: DISCONTINUED | OUTPATIENT
Start: 2020-08-11 | End: 2020-08-11

## 2020-08-11 RX ORDER — LIDOCAINE HYDROCHLORIDE 20 MG/ML
INJECTION INTRAVENOUS
Status: DISCONTINUED | OUTPATIENT
Start: 2020-08-11 | End: 2020-08-11

## 2020-08-11 RX ORDER — CEFAZOLIN SODIUM 2 G/50ML
2 SOLUTION INTRAVENOUS
Status: DISCONTINUED | OUTPATIENT
Start: 2020-08-11 | End: 2020-08-11

## 2020-08-11 RX ORDER — HYDROCHLOROTHIAZIDE 12.5 MG/1
12.5 TABLET ORAL DAILY
Status: DISCONTINUED | OUTPATIENT
Start: 2020-08-11 | End: 2020-08-13

## 2020-08-11 RX ORDER — FLUOXETINE HYDROCHLORIDE 20 MG/1
20 CAPSULE ORAL DAILY
Status: DISCONTINUED | OUTPATIENT
Start: 2020-08-11 | End: 2020-08-14 | Stop reason: HOSPADM

## 2020-08-11 RX ORDER — ETHYNODIOL DIACETATE AND ETHINYL ESTRADIOL 1 MG-50MCG
1 KIT ORAL DAILY
Status: DISCONTINUED | OUTPATIENT
Start: 2020-08-11 | End: 2020-08-13

## 2020-08-11 RX ORDER — BACITRACIN 50000 [IU]/1
INJECTION, POWDER, FOR SOLUTION INTRAMUSCULAR
Status: DISCONTINUED | OUTPATIENT
Start: 2020-08-11 | End: 2020-08-11 | Stop reason: HOSPADM

## 2020-08-11 RX ORDER — MAG HYDROX/ALUMINUM HYD/SIMETH 200-200-20
30 SUSPENSION, ORAL (FINAL DOSE FORM) ORAL EVERY 4 HOURS PRN
Status: DISCONTINUED | OUTPATIENT
Start: 2020-08-11 | End: 2020-08-14 | Stop reason: HOSPADM

## 2020-08-11 RX ORDER — ACETAMINOPHEN 10 MG/ML
INJECTION, SOLUTION INTRAVENOUS
Status: DISCONTINUED | OUTPATIENT
Start: 2020-08-11 | End: 2020-08-11

## 2020-08-11 RX ORDER — ONDANSETRON 8 MG/1
8 TABLET, ORALLY DISINTEGRATING ORAL EVERY 6 HOURS PRN
Status: DISCONTINUED | OUTPATIENT
Start: 2020-08-11 | End: 2020-08-14 | Stop reason: HOSPADM

## 2020-08-11 RX ORDER — METFORMIN HYDROCHLORIDE 500 MG/1
1000 TABLET ORAL 2 TIMES DAILY WITH MEALS
Status: CANCELLED | OUTPATIENT
Start: 2020-08-11

## 2020-08-11 RX ADMIN — HYDROMORPHONE HYDROCHLORIDE 0.2 MG: 2 INJECTION INTRAMUSCULAR; INTRAVENOUS; SUBCUTANEOUS at 01:08

## 2020-08-11 RX ADMIN — FENTANYL CITRATE 50 MCG: 50 INJECTION, SOLUTION INTRAMUSCULAR; INTRAVENOUS at 12:08

## 2020-08-11 RX ADMIN — LIDOCAINE HYDROCHLORIDE 100 MG: 20 INJECTION, SOLUTION INTRAVENOUS at 10:08

## 2020-08-11 RX ADMIN — KETAMINE HYDROCHLORIDE 30 MG: 10 INJECTION, SOLUTION INTRAMUSCULAR; INTRAVENOUS at 11:08

## 2020-08-11 RX ADMIN — FENTANYL CITRATE 100 MCG: 50 INJECTION, SOLUTION INTRAMUSCULAR; INTRAVENOUS at 11:08

## 2020-08-11 RX ADMIN — METHOCARBAMOL TABLETS 500 MG: 500 TABLET, COATED ORAL at 04:08

## 2020-08-11 RX ADMIN — METHOCARBAMOL TABLETS 500 MG: 500 TABLET, COATED ORAL at 08:08

## 2020-08-11 RX ADMIN — GABAPENTIN 300 MG: 300 CAPSULE ORAL at 08:08

## 2020-08-11 RX ADMIN — DEXAMETHASONE SODIUM PHOSPHATE 8 MG: 4 INJECTION, SOLUTION INTRA-ARTICULAR; INTRALESIONAL; INTRAMUSCULAR; INTRAVENOUS; SOFT TISSUE at 11:08

## 2020-08-11 RX ADMIN — ACETAMINOPHEN 1000 MG: 10 INJECTION, SOLUTION INTRAVENOUS at 11:08

## 2020-08-11 RX ADMIN — ROCURONIUM BROMIDE 5 MG: 10 INJECTION, SOLUTION INTRAVENOUS at 10:08

## 2020-08-11 RX ADMIN — SODIUM CHLORIDE, SODIUM GLUCONATE, SODIUM ACETATE, POTASSIUM CHLORIDE, MAGNESIUM CHLORIDE, SODIUM PHOSPHATE, DIBASIC, AND POTASSIUM PHOSPHATE: .53; .5; .37; .037; .03; .012; .00082 INJECTION, SOLUTION INTRAVENOUS at 08:08

## 2020-08-11 RX ADMIN — Medication: at 02:08

## 2020-08-11 RX ADMIN — ONDANSETRON 4 MG: 2 INJECTION, SOLUTION INTRAMUSCULAR; INTRAVENOUS at 12:08

## 2020-08-11 RX ADMIN — PROPOFOL 200 MG: 10 INJECTION, EMULSION INTRAVENOUS at 10:08

## 2020-08-11 RX ADMIN — FENTANYL CITRATE 100 MCG: 50 INJECTION, SOLUTION INTRAMUSCULAR; INTRAVENOUS at 10:08

## 2020-08-11 RX ADMIN — DOCUSATE SODIUM 100 MG: 100 CAPSULE, LIQUID FILLED ORAL at 04:08

## 2020-08-11 RX ADMIN — HYDROMORPHONE HYDROCHLORIDE 0.2 MG: 2 INJECTION INTRAMUSCULAR; INTRAVENOUS; SUBCUTANEOUS at 02:08

## 2020-08-11 RX ADMIN — SUCCINYLCHOLINE CHLORIDE 140 MG: 20 INJECTION, SOLUTION INTRAMUSCULAR; INTRAVENOUS; PARENTERAL at 10:08

## 2020-08-11 RX ADMIN — MIDAZOLAM 2 MG: 1 INJECTION INTRAMUSCULAR; INTRAVENOUS at 10:08

## 2020-08-11 RX ADMIN — SODIUM CHLORIDE, SODIUM LACTATE, POTASSIUM CHLORIDE, AND CALCIUM CHLORIDE 125 ML/HR: .6; .31; .03; .02 INJECTION, SOLUTION INTRAVENOUS at 02:08

## 2020-08-11 RX ADMIN — PHENYLEPHRINE HYDROCHLORIDE 100 MCG: 10 INJECTION INTRAVENOUS at 12:08

## 2020-08-11 RX ADMIN — CEFAZOLIN SODIUM 2 G: 2 SOLUTION INTRAVENOUS at 10:08

## 2020-08-11 RX ADMIN — GLYCOPYRROLATE 0.4 MG: 0.2 INJECTION, SOLUTION INTRAMUSCULAR; INTRAVENOUS at 12:08

## 2020-08-11 RX ADMIN — NEOSTIGMINE METHYLSULFATE 3 MG: 1 INJECTION INTRAVENOUS at 12:08

## 2020-08-11 RX ADMIN — Medication: at 06:08

## 2020-08-11 RX ADMIN — CEFAZOLIN SODIUM 2 G: 2 SOLUTION INTRAVENOUS at 04:08

## 2020-08-11 RX ADMIN — SODIUM CHLORIDE, SODIUM GLUCONATE, SODIUM ACETATE, POTASSIUM CHLORIDE, MAGNESIUM CHLORIDE, SODIUM PHOSPHATE, DIBASIC, AND POTASSIUM PHOSPHATE: .53; .5; .37; .037; .03; .012; .00082 INJECTION, SOLUTION INTRAVENOUS at 11:08

## 2020-08-11 RX ADMIN — SODIUM CHLORIDE, SODIUM LACTATE, POTASSIUM CHLORIDE, AND CALCIUM CHLORIDE 125 ML/HR: .6; .31; .03; .02 INJECTION, SOLUTION INTRAVENOUS at 08:08

## 2020-08-11 RX ADMIN — ROCURONIUM BROMIDE 45 MG: 10 INJECTION, SOLUTION INTRAVENOUS at 10:08

## 2020-08-11 RX ADMIN — ONDANSETRON 4 MG: 2 INJECTION, SOLUTION INTRAMUSCULAR; INTRAVENOUS at 11:08

## 2020-08-11 NOTE — CARE UPDATE
08/11/20 1601   PRE-TX-O2   O2 Device (Oxygen Therapy) nasal cannula   $ Is the patient on Low Flow Oxygen? Yes   Flow (L/min) 3   Oxygen Concentration (%) 32   SpO2 98 %   Pulse Oximetry Type Intermittent   $ Pulse Oximetry - Multiple Charge Pulse Oximetry - Multiple   Resp 14   ETCO2   $ ETCO2 Charge Exhaled CO2 Monitoring   $ ETCO2 Usage Currently wearing   ETCO2 (mmHg) 41 mmHg   ETCO2 Device Type Portable Bedside Monitor;Nasal Cannula   Incentive Spirometer   $ Incentive Spirometer Charges postop instruction   Administration (IS) instruction provided, initial;proper technique demonstrated   Number of Repetitions (IS) 10   Level Incentive Spirometer (mL) 2000   Patient Tolerance (IS) good   Ready to Wean/Extubation Screen   FIO2<=50 (chart decimal) 0.32

## 2020-08-11 NOTE — ANESTHESIA POSTPROCEDURE EVALUATION
Anesthesia Post Evaluation    Patient: Aysha Waters    Procedure(s) Performed: Procedure(s) (LRB):  LAMINECTOMY, SPINE L3, L4, L5 (N/A)    Final Anesthesia Type: general    Patient location during evaluation: PACU  Patient participation: Yes- Able to Participate  Level of consciousness: awake and alert and oriented  Post-procedure vital signs: reviewed and stable  Pain management: adequate  Airway patency: patent  KENDY mitigation strategies: Multimodal analgesia, Extubation while patient is awake and Verification of full reversal of neuromuscular block  PONV status at discharge: No PONV  Anesthetic complications: no      Cardiovascular status: blood pressure returned to baseline  Respiratory status: unassisted, spontaneous ventilation and room air  Hydration status: euvolemic  Follow-up not needed.          Vitals Value Taken Time   /54 08/11/20 1445   Temp 36.2 °C (97.1 °F) 08/11/20 1445   Pulse 99 08/11/20 1445   Resp 20 08/11/20 1504   SpO2 97 % 08/11/20 1445         Event Time   Out of Recovery 14:45:00         Pain/Larry Score: Pain Rating Prior to Med Admin: 6 (8/11/2020  3:04 PM)  Pain Rating Post Med Admin: 6 (8/11/2020  2:15 PM)  Larry Score: 8 (8/11/2020  2:30 PM)

## 2020-08-11 NOTE — H&P
[facility] Hospital Medicine  History & Physical    Patient Name: Aysha Waters  MRN: [id number]  Admission Date: 8/11/2020  Attending Physician: Elisabeth Pagan MD   Primary Care Provider: Jamila Smith MD         Patient information was obtained from patient and past medical records.     Subjective:     Principal Problem:Lumbar disc herniation    Chief Complaint:   Chief Complaint   Patient presents with    Back Pain        HPI: Aysha Waters is a 19-year-old female with PMHx significant for obesity, HTN, PCOS, and back pain.  Pt is S/P LAMINECTOMY, SPINE L3, L4, L5 with Dr. Spain for treatment of disc herniation with intraoperative CSF leak.   Pt was evaluated by Dr. Spain preoperatively for right-sided lumbar lumbosacral pain with intermittent right lower extremity radiculitis dysesthesia to the foot.  Her symptoms started about 9 months ago.  Postoperatively, she complains of 5/10 back pain.  She denies any numbness or tingling.  She denies any chest pain, SOB, nausea, vomiting, or other complaints.  Pt is on bed rest for 24 hr post for seizure early due to CSF fluid leak.  She was placed in outpatient recovery under the service of hospital Medicine postoperatively.  Other pertinent medical Hx as below:    Past Medical History:   Diagnosis Date    Anxiety     Depression     Hypertension     Insulin resistance     Knee derangement     Obesity     PCOS (polycystic ovarian syndrome)     Pinched nerve        Past Surgical History:   Procedure Laterality Date    KNEE ARTHROSCOPY, MEDIAL PATELLO FEMORAL LIGAMENT REPAIR      MOUTH SURGERY      petalla surgery Left 07/2019    medically broke the tib/fib and moved the pettella, no replacement  Dr Mayank [facility]    TONSILLECTOMY         Review of patient's allergies indicates:  No Known Allergies    No current facility-administered medications on file prior to encounter.      Current Outpatient Medications on  File Prior to Encounter   Medication Sig    ethynodiol-ethinyl estradiol (ZOVIA) 1-50 mg-mcg per tablet Take 1 tablet by mouth once daily.    FLUoxetine (PROZAC) 20 MG capsule Take 20 mg by mouth once daily.    gabapentin (NEURONTIN) 300 MG capsule Take 1 capsule (300 mg total) by mouth every evening.    hydroCHLOROthiazide (HYDRODIURIL) 12.5 MG Tab Take 1 tablet (12.5 mg total) by mouth once daily.    lisinopril (PRINIVIL,ZESTRIL) 40 MG tablet Take 1 tablet (40 mg total) by mouth once daily.    metFORMIN (GLUCOPHAGE) 500 MG tablet Take 1,000 mg by mouth 2 (two) times daily with meals.     methocarbamoL (ROBAXIN) 750 MG Tab Take 1-2 tablets by mouth every 8 hours as needed for muscle spasms    tretinoin (RETIN-A) 0.05 % cream Apply topically every evening.     Family History     Problem Relation (Age of Onset)    Heart disease Father, Paternal Grandfather    Hypertension Father    Obesity Sister        Tobacco Use    Smoking status: Never Smoker    Smokeless tobacco: Never Used   Substance and Sexual Activity    Alcohol use: No     Frequency: Never     Binge frequency: Never    Drug use: No    Sexual activity: Never     Birth control/protection: OCP     Review of Systems   Constitutional: Negative for chills, fatigue and fever.   HENT: Negative for congestion, postnasal drip and trouble swallowing.    Eyes: Negative for photophobia and visual disturbance.   Respiratory: Negative for cough, shortness of breath and wheezing.    Cardiovascular: Negative for chest pain and leg swelling.   Gastrointestinal: Negative for abdominal pain, constipation, diarrhea, nausea and vomiting.   Genitourinary: Negative for difficulty urinating, dysuria, flank pain and frequency.   Musculoskeletal: Positive for back pain. Negative for arthralgias and myalgias.   Neurological: Negative for dizziness, weakness, numbness and headaches.   Psychiatric/Behavioral: Negative for confusion. The patient is not nervous/anxious.       Objective:     Vital Signs (Most Recent):  Temp: 96.1 °F (35.6 °C) (08/11/20 1639)  Pulse: 102 (08/11/20 1639)  Resp: 18 (08/11/20 1639)  BP: (!) 109/55 (08/11/20 1639)  SpO2: 97 % (08/11/20 1639) Vital Signs (24h Range):  Temp:  [96.1 °F (35.6 °C)-97.5 °F (36.4 °C)] 96.1 °F (35.6 °C)  Pulse:  [] 102  Resp:  [14-20] 18  SpO2:  [90 %-99 %] 97 %  BP: ()/(52-78) 109/55     Weight: 111.1 kg (245 lb)  Body mass index is 39.54 kg/m².    Physical Exam  Constitutional:       Appearance: Normal appearance. She is obese.   HENT:      Head: Normocephalic and atraumatic.      Mouth/Throat:      Mouth: Mucous membranes are moist.      Pharynx: Oropharynx is clear.   Eyes:      Extraocular Movements: Extraocular movements intact.      Conjunctiva/sclera: Conjunctivae normal.      Pupils: Pupils are equal, round, and reactive to light.   Neck:      Musculoskeletal: Normal range of motion and neck supple.   Cardiovascular:      Rate and Rhythm: Normal rate and regular rhythm.      Pulses: Normal pulses.      Heart sounds: Normal heart sounds.   Pulmonary:      Effort: Pulmonary effort is normal. No respiratory distress.      Breath sounds: Normal breath sounds.   Abdominal:      General: Bowel sounds are normal. There is no distension.      Palpations: Abdomen is soft.      Tenderness: There is no abdominal tenderness.   Musculoskeletal: Normal range of motion.         General: No swelling or tenderness.   Skin:     General: Skin is warm and dry.      Capillary Refill: Capillary refill takes less than 2 seconds.   Neurological:      General: No focal deficit present.      Mental Status: She is alert and oriented to person, place, and time. Mental status is at baseline.   Psychiatric:         Mood and Affect: Mood normal.         Behavior: Behavior normal.           CRANIAL NERVES     CN III, IV, VI   Pupils are equal, round, and reactive to light.       Significant Labs:   CBC:   Recent Labs   Lab 08/11/20  1302    WBC 12.83*   HGB 10.2*   HCT 31.8*        CMP:   Recent Labs   Lab 08/11/20  1302      K 4.1      CO2 23   *   BUN 7   CREATININE 0.6   CALCIUM 8.0*   ANIONGAP 10   EGFRNONAA >60           Assessment/Plan:     * Lumbar disc herniation  POD 0 s/p LAMINECTOMY, SPINE L3, L4, L5  with Dr. Spain.  Pt with CSF fluid leak postoperatively.  Maintain bed rest.  Pain control-currently on PCA  Continue care per orthopedic orders.      Ruptured lumbar intervertebral disc  As above      Essential hypertension  Chronic, controlled.  Latest blood pressure and vitals reviewed-   Temp:  [96.1 °F (35.6 °C)-97.5 °F (36.4 °C)]   Pulse:  []   Resp:  [14-20]   BP: ()/(52-78)   SpO2:  [90 %-99 %] .   Home meds for hypertension were reviewed and noted below. Hospital anti-hypertensive changes were made as shown below.  Hypertension Medications             hydroCHLOROthiazide (HYDRODIURIL) 12.5 MG Tab Take 1 tablet (12.5 mg total) by mouth once daily.    lisinopril (PRINIVIL,ZESTRIL) 40 MG tablet Take 1 tablet (40 mg total) by mouth once daily.      Hospital Medications             hydroCHLOROthiazide tablet 12.5 mg 12.5 mg, Oral, Daily    lisinopriL tablet 40 mg 40 mg, Oral, Daily        Will utilize p.r.n. blood pressure medication only if patient's blood pressure greater than  180/110 and she develops symptoms such as worsening chest pain or shortness of breath.        Obesity  Body mass index is 39.54 kg/m².  Obesity compounds patient co-morbidities and complicates treatment course.  Counseling given regarding diet modification and exercise recommendations.          VTE Risk Mitigation (From admission, onward)         Ordered     IP VTE LOW RISK PATIENT  Once      08/11/20 1500     Place sequential compression device  Until discontinued      08/11/20 0730     Place CARIDAD hose  Until discontinued      08/11/20 0730                   Jamila Hoover NP  Department of Hospital Medicine   [facility]

## 2020-08-11 NOTE — PLAN OF CARE
Patient received in to room 418 via bed from , awake and alert. Oriented to room, mother at bedside. Laying flat as ordered. Dressing clean, dry and intact to mid/lower back.

## 2020-08-11 NOTE — INTERVAL H&P NOTE
The patient has been examined and the H&P has been reviewed:    I concur with the findings and no changes have occurred since H&P was written.    Surgery risks, benefits and alternative options discussed and understood by patient/family.          Active Hospital Problems    Diagnosis  POA    Lumbar disc herniation [M51.26]  Yes      Resolved Hospital Problems   No resolved problems to display.

## 2020-08-11 NOTE — TRANSFER OF CARE
"Anesthesia Transfer of Care Note    Patient: Aysha Waters    Procedure(s) Performed: Procedure(s) (LRB):  LAMINECTOMY, SPINE L3, L4, L5 (N/A)    Patient location: PACU    Anesthesia Type: general    Transport from OR: Transported from OR on 6-10 L/min O2 by face mask with adequate spontaneous ventilation    Post pain: adequate analgesia    Post assessment: no apparent anesthetic complications    Post vital signs: stable    Level of consciousness: sedated    Nausea/Vomiting: no nausea/vomiting    Complications: none    Transfer of care protocol was followed      Last vitals:   Visit Vitals  /78   Pulse 90   Temp 36.3 °C (97.4 °F)   Resp 16   Ht 5' 6" (1.676 m)   Wt 111.1 kg (245 lb)   SpO2 99%   Breastfeeding No   BMI 39.54 kg/m²     "

## 2020-08-11 NOTE — ASSESSMENT & PLAN NOTE
Chronic, controlled.  Latest blood pressure and vitals reviewed-   Temp:  [96.1 °F (35.6 °C)-97.5 °F (36.4 °C)]   Pulse:  []   Resp:  [14-20]   BP: ()/(52-78)   SpO2:  [90 %-99 %] .   Home meds for hypertension were reviewed and noted below. Hospital anti-hypertensive changes were made as shown below.  Hypertension Medications             hydroCHLOROthiazide (HYDRODIURIL) 12.5 MG Tab Take 1 tablet (12.5 mg total) by mouth once daily.    lisinopril (PRINIVIL,ZESTRIL) 40 MG tablet Take 1 tablet (40 mg total) by mouth once daily.      Hospital Medications             hydroCHLOROthiazide tablet 12.5 mg 12.5 mg, Oral, Daily    lisinopriL tablet 40 mg 40 mg, Oral, Daily        Will utilize p.r.n. blood pressure medication only if patient's blood pressure greater than  180/110 and she develops symptoms such as worsening chest pain or shortness of breath.

## 2020-08-11 NOTE — BRIEF OP NOTE
[facility]  Brief Operative Note    SUMMARY     Surgery Date: 8/11/2020     Surgeon(s) and Role:     * Nirav Spain MD - Primary    Assisting Surgeon: moncho Aguilar    Pre-op Diagnosis:  Lumbar disc herniation [M51.26]  Spinal stenosis of lumbar region with neurogenic claudication [M48.062]    Post-op Diagnosis:  Post-Op Diagnosis Codes:     * Lumbar disc herniation [M51.26]     * Spinal stenosis of lumbar region with neurogenic claudication [M48.062]    Procedure(s) (LRB):  LAMINECTOMY, SPINE L3, L4, L5 (N/A)    Anesthesia: General    Description of Procedure:  Ms. L3-L4 L5 partial diskectomy L4-5 disc and L5-S1 disc    Description of the findings of the procedure:  Large massive disc herniation L4-5 right paracentral disc herniation L5-S1 spinal stenosis    Estimated Blood Loss: 250 mL         Specimens:  Disc fragments  Specimen (12h ago, onward)    None

## 2020-08-11 NOTE — PLAN OF CARE
Pre op assessment in progress  Pt calm, a little nervous  Pt will give all her personal belongings to her mother and she gives permission to speak with her mother about her medical information

## 2020-08-11 NOTE — SUBJECTIVE & OBJECTIVE
Past Medical History:   Diagnosis Date    Anxiety     Depression     Hypertension     Insulin resistance     Knee derangement     Obesity     PCOS (polycystic ovarian syndrome)     Pinched nerve        Past Surgical History:   Procedure Laterality Date    KNEE ARTHROSCOPY, MEDIAL PATELLO FEMORAL LIGAMENT REPAIR      MOUTH SURGERY      petalla surgery Left 07/2019    medically broke the tib/fib and moved the pettella, no replacement  [facility]    TONSILLECTOMY         Review of patient's allergies indicates:  No Known Allergies    No current facility-administered medications on file prior to encounter.      Current Outpatient Medications on File Prior to Encounter   Medication Sig    ethynodiol-ethinyl estradiol (ZOVIA) 1-50 mg-mcg per tablet Take 1 tablet by mouth once daily.    FLUoxetine (PROZAC) 20 MG capsule Take 20 mg by mouth once daily.    gabapentin (NEURONTIN) 300 MG capsule Take 1 capsule (300 mg total) by mouth every evening.    hydroCHLOROthiazide (HYDRODIURIL) 12.5 MG Tab Take 1 tablet (12.5 mg total) by mouth once daily.    lisinopril (PRINIVIL,ZESTRIL) 40 MG tablet Take 1 tablet (40 mg total) by mouth once daily.    metFORMIN (GLUCOPHAGE) 500 MG tablet Take 1,000 mg by mouth 2 (two) times daily with meals.     methocarbamoL (ROBAXIN) 750 MG Tab Take 1-2 tablets by mouth every 8 hours as needed for muscle spasms    tretinoin (RETIN-A) 0.05 % cream Apply topically every evening.     Family History     Problem Relation (Age of Onset)    Heart disease Father, Paternal Grandfather    Hypertension Father    Obesity Sister        Tobacco Use    Smoking status: Never Smoker    Smokeless tobacco: Never Used   Substance and Sexual Activity    Alcohol use: No     Frequency: Never     Binge frequency: Never    Drug use: No    Sexual activity: Never     Birth control/protection: OCP     Review of Systems   Constitutional: Negative for chills, fatigue and  fever.   HENT: Negative for congestion, postnasal drip and trouble swallowing.    Eyes: Negative for photophobia and visual disturbance.   Respiratory: Negative for cough, shortness of breath and wheezing.    Cardiovascular: Negative for chest pain and leg swelling.   Gastrointestinal: Negative for abdominal pain, constipation, diarrhea, nausea and vomiting.   Genitourinary: Negative for difficulty urinating, dysuria, flank pain and frequency.   Musculoskeletal: Positive for back pain. Negative for arthralgias and myalgias.   Neurological: Negative for dizziness, weakness, numbness and headaches.   Psychiatric/Behavioral: Negative for confusion. The patient is not nervous/anxious.      Objective:     Vital Signs (Most Recent):  Temp: 96.1 °F (35.6 °C) (08/11/20 1639)  Pulse: 102 (08/11/20 1639)  Resp: 18 (08/11/20 1639)  BP: (!) 109/55 (08/11/20 1639)  SpO2: 97 % (08/11/20 1639) Vital Signs (24h Range):  Temp:  [96.1 °F (35.6 °C)-97.5 °F (36.4 °C)] 96.1 °F (35.6 °C)  Pulse:  [] 102  Resp:  [14-20] 18  SpO2:  [90 %-99 %] 97 %  BP: ()/(52-78) 109/55     Weight: 111.1 kg (245 lb)  Body mass index is 39.54 kg/m².    Physical Exam  Constitutional:       Appearance: Normal appearance. She is obese.   HENT:      Head: Normocephalic and atraumatic.      Mouth/Throat:      Mouth: Mucous membranes are moist.      Pharynx: Oropharynx is clear.   Eyes:      Extraocular Movements: Extraocular movements intact.      Conjunctiva/sclera: Conjunctivae normal.      Pupils: Pupils are equal, round, and reactive to light.   Neck:      Musculoskeletal: Normal range of motion and neck supple.   Cardiovascular:      Rate and Rhythm: Normal rate and regular rhythm.      Pulses: Normal pulses.      Heart sounds: Normal heart sounds.   Pulmonary:      Effort: Pulmonary effort is normal. No respiratory distress.      Breath sounds: Normal breath sounds.   Abdominal:      General: Bowel sounds are normal. There is no distension.       Palpations: Abdomen is soft.      Tenderness: There is no abdominal tenderness.   Musculoskeletal: Normal range of motion.         General: No swelling or tenderness.   Skin:     General: Skin is warm and dry.      Capillary Refill: Capillary refill takes less than 2 seconds.   Neurological:      General: No focal deficit present.      Mental Status: She is alert and oriented to person, place, and time. Mental status is at baseline.   Psychiatric:         Mood and Affect: Mood normal.         Behavior: Behavior normal.           CRANIAL NERVES     CN III, IV, VI   Pupils are equal, round, and reactive to light.       Significant Labs:   CBC:   Recent Labs   Lab 08/11/20  1302   WBC 12.83*   HGB 10.2*   HCT 31.8*        CMP:   Recent Labs   Lab 08/11/20  1302      K 4.1      CO2 23   *   BUN 7   CREATININE 0.6   CALCIUM 8.0*   ANIONGAP 10   EGFRNONAA >60

## 2020-08-11 NOTE — ASSESSMENT & PLAN NOTE
POD 0 s/p LAMINECTOMY, SPINE L3, L4, L5  with Dr. Spain.  Pt with CSF fluid leak postoperatively.  Maintain bed rest.  Pain control-currently on PCA  Continue care per orthopedic orders.

## 2020-08-11 NOTE — PLAN OF CARE
Report to Kelle. Patient states pain level 6 but appears comfortable. Dressing to back dry, intact, no drainage, vs stable, respirations even and unlabored, no obvious distress.mother at bedside.

## 2020-08-11 NOTE — ANESTHESIA PREPROCEDURE EVALUATION
08/11/2020  Aysha Waters is a 19 y.o., female.    Anesthesia Evaluation          Review of Systems  Anesthesia Hx:  No problems with previous Anesthesia   Social:  Non-Smoker    Cardiovascular:   Exercise tolerance: good Hypertension ECG has been reviewed.    Pulmonary:  Pulmonary Normal    Renal/:  Renal/ Normal     Hepatic/GI:  Hepatic/GI Normal    Musculoskeletal:  Spine Disorders:    Neurological:   Peripheral Neuropathy    Endocrine:  Endocrine Normal    Psych:   Psychiatric History depression          Physical Exam  General:  Morbid Obesity    Airway/Jaw/Neck:  Airway Findings: Mouth Opening: Normal Tongue: Normal  General Airway Assessment: Adult  Mallampati: II  Improves to I with phonation.  TM Distance: Normal, at least 6 cm       Chest/Lungs:  Chest/Lungs Clear    Heart/Vascular:  Heart Findings: Normal            Anesthesia Plan  Type of Anesthesia, risks & benefits discussed:  Anesthesia Type:  general  Patient's Preference:   Intra-op Monitoring Plan: standard ASA monitors  Intra-op Monitoring Plan Comments:   Post Op Pain Control Plan: multimodal analgesia and IV/PO Opioids PRN  Post Op Pain Control Plan Comments:   Induction:   IV  Beta Blocker:  Patient is not currently on a Beta-Blocker (No further documentation required).       Informed Consent: Patient understands risks and agrees with Anesthesia plan.  Questions answered. Anesthesia consent signed with patient.  ASA Score: 2     Day of Surgery Review of History & Physical:    H&P update referred to the surgeon.     Anesthesia Plan Notes: I have personally evaluated the patient and discussed risk/benefits/alternatives of general anesthesia.        Ready For Surgery From Anesthesia Perspective.

## 2020-08-11 NOTE — ASSESSMENT & PLAN NOTE
Body mass index is 39.54 kg/m².  Obesity compounds patient co-morbidities and complicates treatment course.  Counseling given regarding diet modification and exercise recommendations.

## 2020-08-11 NOTE — OP NOTE
Dictation #1  MRN:[id number]  CSN:[id number]  [facility]  Orthopedic  Operative Note    SUMMARY     Date of Procedure: 8/11/2020     Procedure:   1.  L3 laminectomy with partial facetectomy foraminotomy 6 CPT code 77833  2.  L4 of laminectomy partial facetectomy foraminotomy and partial diskectomy CPT code 29841  3.  L5 laminectomy partial facetectomy foraminotomy and partial diskectomy CPT code 6048  4.  Repair incidental durotomy CPT code 37369  Surgeon(s) and Role:     * Nirav Spain MD - Primary    Assisting Surgeon:  Conrad JOSEPH    Pre-Operative Diagnosis: Lumbar disc herniation [M51.26]  Spinal stenosis of lumbar region with neurogenic claudication [M48.062]    Post-Operative Diagnosis: Post-Op Diagnosis Codes:     * Lumbar disc herniation [M51.26]     * Spinal stenosis of lumbar region with neurogenic claudication [M48.062]    Anesthesia: General    Procedure in General:  Due to complexity of the surgical procedure skilled surgical assistant was necessary and Conrad Aguilar physician's assistant served neck capacity.  No qualified resident physician was available.  The patient was brought to the operating room while supine was in dated and then turned prone Mikhail frame with the abdomen allowed to hang free bony comes to back was shaved cleansed with alcohol ChloraPrep solution a time-out was performed she was given intravenous antibiotics.  An incision was made from L3 to the sacrum and paraspinous muscles elevated from the midline out to the facet retained retracted with surgery.  We identified the L2 5 lamina and then performed a laminectomy at L5 the symptoms were predominantly right-sided a complete hemilaminectomy of L5 lamina was performed there was significant compression of the S1 nerve root CC traversing and exiting L5 nerve root we identified a large right paracentral disc herniation at the L5-S1 level confirming our level with fluoroscopy.  Carefully retracting the  S1 nerve root to midline we then incised the disc annulus and removed several large fragments of disc material within I used a beaded probe explore the canal in the thecal sac and S1 nerve roots were completely decompressed.  Next we went to the L4 level removing spinous process in the midline and then performing a complete laminectomy.  A partial facetectomy was performed there was significant facet hypertrophy and lateral recess stenosis the L5 nerve root was significant compressed in the lateral recess as well as a large central disc herniation a complete L5 foraminotomy was performed with partial facetectomy on the right side we carefully root protected the nerve root and retracted the thecal sac toward the midline identifying this large massive disc herniation confirmed on MRI we incised the disc annulus and removed some very large fragments of disc material we then palpated spinal canal in the central disc herniation was no longer there.  We did note that in the process of taking out the disc fragment that there was a small of lab BLE be on the dura at the take of of the nerve root probably because of adherence of the large massive disc herniation to the right L5 nerve root.  There was a minimal leakage of spinal fluid the bleb measured about 2 mm.  Using a single 4 0 nylon Nurolon suture we then repaired this bleb.  We then did several Valsalva maneuvers with the anesthesiology service to make sure that there was no leakage of spinal fluid.  Next we removed spinous process of L3 and performed a laminectomy and decompression on the right side of the L4 nerve root we palpated the L3-4 disc and it was a small central protrusion but not significant enough to remove it.  Throughout the case bipolar electrocautery was used as necessary.  Because we had I repaired the small dural bleb we then prepared some hemostatic sealant and when he was prepared applied a thin layer over the L5 nerve root on the right side to  further protected and reinforce the repair.  Neck is the several pieces of Gelfoam placed over the exposed dura and nerve roots in the lumbodorsal fascia was closed tightly with Vicryl suture subcutaneous tissues were closed with absorbable suture and skin was then closed with stainless steel staples steroid applied and the patient was spine repair condition            Complications: None    Estimated Blood Loss (EBL):            Implants: * No implants in log *    Specimens:   Specimen (12h ago, onward)    None                  Condition: Good    Disposition: PACU - hemodynamically stable.    Attestation: I was present and scrubbed for the entire procedure.

## 2020-08-11 NOTE — PLAN OF CARE
Cm completed the assessment with pt and mother at bedside.  Pt is independent in care.  PCP is Dr. Smith.  Insurance verified as [employer]. Pt denies diabetes, dialysis and coumadin.  Disposition:  Pt will discharge to home with mother.  PT/OT to evaluate.       08/11/20 1538   Discharge Assessment   Assessment Type Discharge Planning Assessment   Confirmed/corrected address and phone number on facesheet? Yes   Assessment information obtained from? Patient   Expected Length of Stay (days) 2   Communicated expected length of stay with patient/caregiver yes   Prior to hospitilization cognitive status: Alert/Oriented   Prior to hospitalization functional status: Independent   Current cognitive status: Alert/Oriented   Current Functional Status: Independent   Facility Arrived From: home   Lives With parent(s)   Able to Return to Prior Arrangements yes   Is patient able to care for self after discharge? Yes   Who are your caregiver(s) and their phone number(s)? - Maida- [phone]   Patient's perception of discharge disposition home or selfcare   Readmission Within the Last 30 Days no previous admission in last 30 days   Patient currently being followed by outpatient case management? No   Patient currently receives any other outside agency services? No   Equipment Currently Used at Home none   Do you have any problems affording any of your prescribed medications? No   Is the patient taking medications as prescribed? yes   Does the patient have transportation home? Yes   Transportation Anticipated family or friend will provide   Dialysis Name and Scheduled days na   Does the patient receive services at the Coumadin Clinic? No   Discharge Plan A Home with family;Home Health   Discharge Plan B Home with family   DME Needed Upon Discharge  none   Patient/Family in Agreement with Plan yes

## 2020-08-11 NOTE — HPI
Aysha Waters is a 19-year-old female with PMHx significant for obesity, HTN, PCOS, and back pain.  Pt is S/P LAMINECTOMY, SPINE L3, L4, L5 with Dr. Spain for treatment of disc herniation with intraoperative CSF leak.   Pt was evaluated by Dr. Spain preoperatively for right-sided lumbar lumbosacral pain with intermittent right lower extremity radiculitis dysesthesia to the foot.  Her symptoms started about 9 months ago.  Postoperatively, she complains of 5/10 back pain.  She denies any numbness or tingling.  She denies any chest pain, SOB, nausea, vomiting, or other complaints.  Pt is on bed rest for 24 hr post for seizure early due to CSF fluid leak.  She was placed in outpatient recovery under the service of hospital Medicine postoperatively.  Other pertinent medical Hx as below:

## 2020-08-12 LAB
ANION GAP SERPL CALC-SCNC: 11 MMOL/L (ref 8–16)
BASOPHILS # BLD AUTO: 0.04 K/UL (ref 0–0.2)
BASOPHILS NFR BLD: 0.3 % (ref 0–1.9)
BUN SERPL-MCNC: 5 MG/DL (ref 6–20)
CALCIUM SERPL-MCNC: 8.7 MG/DL (ref 8.7–10.5)
CHLORIDE SERPL-SCNC: 104 MMOL/L (ref 95–110)
CO2 SERPL-SCNC: 24 MMOL/L (ref 23–29)
CREAT SERPL-MCNC: 0.6 MG/DL (ref 0.5–1.4)
DIFFERENTIAL METHOD: ABNORMAL
EOSINOPHIL # BLD AUTO: 0 K/UL (ref 0–0.5)
EOSINOPHIL NFR BLD: 0.3 % (ref 0–8)
ERYTHROCYTE [DISTWIDTH] IN BLOOD BY AUTOMATED COUNT: 13.1 % (ref 11.5–14.5)
EST. GFR  (AFRICAN AMERICAN): >60 ML/MIN/1.73 M^2
EST. GFR  (NON AFRICAN AMERICAN): >60 ML/MIN/1.73 M^2
GLUCOSE SERPL-MCNC: 100 MG/DL (ref 70–110)
HCT VFR BLD AUTO: 32.1 % (ref 37–48.5)
HGB BLD-MCNC: 10.1 G/DL (ref 12–16)
IMM GRANULOCYTES # BLD AUTO: 0.05 K/UL (ref 0–0.04)
IMM GRANULOCYTES NFR BLD AUTO: 0.4 % (ref 0–0.5)
LYMPHOCYTES # BLD AUTO: 2.3 K/UL (ref 1–4.8)
LYMPHOCYTES NFR BLD: 19.7 % (ref 18–48)
MCH RBC QN AUTO: 25.8 PG (ref 27–31)
MCHC RBC AUTO-ENTMCNC: 31.5 G/DL (ref 32–36)
MCV RBC AUTO: 82 FL (ref 82–98)
MONOCYTES # BLD AUTO: 0.7 K/UL (ref 0.3–1)
MONOCYTES NFR BLD: 5.8 % (ref 4–15)
NEUTROPHILS # BLD AUTO: 8.6 K/UL (ref 1.8–7.7)
NEUTROPHILS NFR BLD: 73.5 % (ref 38–73)
NRBC BLD-RTO: 0 /100 WBC
PLATELET # BLD AUTO: 282 K/UL (ref 150–350)
PMV BLD AUTO: 9.9 FL (ref 9.2–12.9)
POTASSIUM SERPL-SCNC: 3.9 MMOL/L (ref 3.5–5.1)
RBC # BLD AUTO: 3.92 M/UL (ref 4–5.4)
SODIUM SERPL-SCNC: 139 MMOL/L (ref 136–145)
WBC # BLD AUTO: 11.65 K/UL (ref 3.9–12.7)

## 2020-08-12 PROCEDURE — 85025 COMPLETE CBC W/AUTO DIFF WBC: CPT

## 2020-08-12 PROCEDURE — 63600175 PHARM REV CODE 636 W HCPCS: Performed by: ORTHOPAEDIC SURGERY

## 2020-08-12 PROCEDURE — 99900035 HC TECH TIME PER 15 MIN (STAT)

## 2020-08-12 PROCEDURE — 63600175 PHARM REV CODE 636 W HCPCS: Performed by: PHYSICIAN ASSISTANT

## 2020-08-12 PROCEDURE — 36415 COLL VENOUS BLD VENIPUNCTURE: CPT

## 2020-08-12 PROCEDURE — 94770 HC EXHALED C02 TEST: CPT

## 2020-08-12 PROCEDURE — 94761 N-INVAS EAR/PLS OXIMETRY MLT: CPT

## 2020-08-12 PROCEDURE — 94799 UNLISTED PULMONARY SVC/PX: CPT

## 2020-08-12 PROCEDURE — 27000221 HC OXYGEN, UP TO 24 HOURS

## 2020-08-12 PROCEDURE — 63600175 PHARM REV CODE 636 W HCPCS: Performed by: ANESTHESIOLOGY

## 2020-08-12 PROCEDURE — 80048 BASIC METABOLIC PNL TOTAL CA: CPT

## 2020-08-12 PROCEDURE — 25000003 PHARM REV CODE 250: Performed by: PHYSICIAN ASSISTANT

## 2020-08-12 PROCEDURE — 12000002 HC ACUTE/MED SURGE SEMI-PRIVATE ROOM

## 2020-08-12 RX ADMIN — ACETAMINOPHEN 1000 MG: 10 INJECTION, SOLUTION INTRAVENOUS at 02:08

## 2020-08-12 RX ADMIN — CEFAZOLIN SODIUM 2 G: 2 SOLUTION INTRAVENOUS at 05:08

## 2020-08-12 RX ADMIN — OXYCODONE HYDROCHLORIDE 15 MG: 10 TABLET ORAL at 12:08

## 2020-08-12 RX ADMIN — BISACODYL 10 MG: 10 SUPPOSITORY RECTAL at 08:08

## 2020-08-12 RX ADMIN — METHOCARBAMOL TABLETS 500 MG: 500 TABLET, COATED ORAL at 08:08

## 2020-08-12 RX ADMIN — METHOCARBAMOL TABLETS 500 MG: 500 TABLET, COATED ORAL at 09:08

## 2020-08-12 RX ADMIN — FLUOXETINE HYDROCHLORIDE 20 MG: 20 CAPSULE ORAL at 08:08

## 2020-08-12 RX ADMIN — LISINOPRIL 40 MG: 40 TABLET ORAL at 08:08

## 2020-08-12 RX ADMIN — GABAPENTIN 300 MG: 300 CAPSULE ORAL at 09:08

## 2020-08-12 RX ADMIN — ACETAMINOPHEN 1000 MG: 10 INJECTION, SOLUTION INTRAVENOUS at 10:08

## 2020-08-12 RX ADMIN — HYDROMORPHONE HYDROCHLORIDE 2 MG: 2 INJECTION INTRAMUSCULAR; INTRAVENOUS; SUBCUTANEOUS at 07:08

## 2020-08-12 RX ADMIN — DOCUSATE SODIUM 100 MG: 100 CAPSULE, LIQUID FILLED ORAL at 08:08

## 2020-08-12 RX ADMIN — OXYCODONE HYDROCHLORIDE 15 MG: 10 TABLET ORAL at 06:08

## 2020-08-12 RX ADMIN — Medication: at 02:08

## 2020-08-12 RX ADMIN — METHOCARBAMOL TABLETS 500 MG: 500 TABLET, COATED ORAL at 12:08

## 2020-08-12 RX ADMIN — METHOCARBAMOL TABLETS 500 MG: 500 TABLET, COATED ORAL at 04:08

## 2020-08-12 RX ADMIN — HYDROMORPHONE HYDROCHLORIDE 2 MG: 2 INJECTION INTRAMUSCULAR; INTRAVENOUS; SUBCUTANEOUS at 04:08

## 2020-08-12 RX ADMIN — OXYCODONE HYDROCHLORIDE 15 MG: 10 TABLET ORAL at 10:08

## 2020-08-12 RX ADMIN — HYDROCHLOROTHIAZIDE 12.5 MG: 12.5 TABLET ORAL at 08:08

## 2020-08-12 NOTE — ASSESSMENT & PLAN NOTE
POD 1 s/p LAMINECTOMY, SPINE L3, L4, L5  with Dr. Spain.  Pt with CSF fluid leak postoperatively.  Maintain bed rest until 12:00 p.m. today and then can start slowly raising head of bed per orthopedic recommendations  Pain control-currently on PCA  Continue care per orthopedic orders.

## 2020-08-12 NOTE — SUBJECTIVE & OBJECTIVE
"Principal Problem:Lumbar disc herniation    Principal Orthopedic Problem:      Interval History:      Review of patient's allergies indicates:  No Known Allergies    Current Facility-Administered Medications   Medication    acetaminophen 1,000 mg/100 mL (10 mg/mL) injection 1,000 mg    acetaminophen tablet 650 mg    aluminum-magnesium hydroxide-simethicone 200-200-20 mg/5 mL suspension 30 mL    bisacodyL suppository 10 mg    diphenhydrAMINE capsule 50 mg    docusate sodium capsule 100 mg    electrolyte-S (ISOLYTE)    ethynodiol-ethinyl estradiol 1-50 mg-mcg per tablet 1 tablet    FLUoxetine capsule 20 mg    gabapentin capsule 300 mg    hydroCHLOROthiazide tablet 12.5 mg    hydromorphone (PF) injection 2 mg    HYDROmorphone PCA syringe 6 mg/30 mL (0.2 mg/mL) NS    lactated ringers infusion    lisinopriL tablet 40 mg    methocarbamoL tablet 500 mg    naloxone 0.4 mg/mL injection 0.02 mg    ondansetron disintegrating tablet 8 mg    oxyCODONE immediate release tablet 15 mg    oxyCODONE immediate release tablet 5 mg    oxyCODONE immediate release tablet Tab 10 mg    promethazine (PHENERGAN) 12.5 mg in dextrose 5 % 50 mL IVPB    senna-docusate 8.6-50 mg per tablet 2 tablet     Objective:     Vital Signs (Most Recent):  Temp: 98 °F (36.7 °C) (08/12/20 0406)  Pulse: 71 (08/12/20 0406)  Resp: 18 (08/12/20 0406)  BP: (!) 119/57 (08/12/20 0406)  SpO2: 99 % (08/12/20 0406) Vital Signs (24h Range):  Temp:  [96.1 °F (35.6 °C)-98.1 °F (36.7 °C)] 98 °F (36.7 °C)  Pulse:  [] 71  Resp:  [14-20] 18  SpO2:  [90 %-99 %] 99 %  BP: ()/(52-78) 119/57     Weight: 111.1 kg (245 lb)  Height: 5' 6" (167.6 cm)  Body mass index is 39.54 kg/m².      Intake/Output Summary (Last 24 hours) at 8/12/2020 0720  Last data filed at 8/12/2020 0520  Gross per 24 hour   Intake 4191.67 ml   Output 4350 ml   Net -158.33 ml       General    Vitals reviewed.  Pulmonary/Chest: Effort normal.   Abdominal: Soft.   Neurological: " She is alert.   Psychiatric: She has a normal mood and affect. Her behavior is normal.         Back (L-Spine & T-Spine) / Neck (C-Spine) Exam     Comments:  Motor exam grossly normal all major muscle groups of both lower extremities.        Significant Labs: All pertinent labs within the past 24 hours have been reviewed.    Significant Imaging: None

## 2020-08-12 NOTE — CARE UPDATE
08/11/20 1912   Patient Assessment/Suction   Level of Consciousness (AVPU) alert   PRE-TX-O2   O2 Device (Oxygen Therapy) nasal cannula   Flow (L/min) 2   Oxygen Concentration (%) 28   SpO2 98 %   Pulse Oximetry Type Intermittent   Pulse 88   Resp 16   ETCO2   $ ETCO2 Charge Exhaled CO2 Monitoring   $ ETCO2 Usage Currently wearing   ETCO2 (mmHg) 44 mmHg   ETCO2 Device Type Portable Bedside Monitor   Incentive Spirometer   $ Incentive Spirometer Charges done with encouragement   Administration (IS) proper technique demonstrated   Number of Repetitions (IS) 10   Level Incentive Spirometer (mL) 2000   Patient Tolerance (IS) good   Ready to Wean/Extubation Screen   FIO2<=50 (chart decimal) 0.28

## 2020-08-12 NOTE — PT/OT/SLP PROGRESS
Physical Therapy      Patient Name:  Aysha Waters   MRN:  [id number]    Patient not seen today secondary to MD hold (Comment)(MD hold for 24 hours due to CSF leak). Will follow-up 08/13/2020.    Chris Megilligan, PT

## 2020-08-12 NOTE — NURSING
Elevated HOB to 30 degrees. Pt tolerated well. Denies HA, n/v at this time. Will continue to monitor.

## 2020-08-12 NOTE — HPI
Postoperative day 3. S/P multilevel lumbar laminectomy for multilevel disc herniations.     - Cont to deny N/V and HA with all OOB activity  - Did better with PT yesterday  - Ready to go home today

## 2020-08-12 NOTE — PLAN OF CARE
POC discussed with pt, pt verbalized understanding. Oriented x4. PIV cdi, LR infusing. Barron draining yellow urine. Incision on back cdi. PCA pump in place, pt states that pain is controlled at a 3. Tolerating clear liquid diet without complaints. Neurovascular checks done, no changes. SCD's in place. Pt remained flat per orders. Call light in reach, bed alarm set, safety maintained. No complaints or requests at this time, will continue to monitor.

## 2020-08-12 NOTE — PROGRESS NOTES
[facility]  Orthopedics  Progress Note    Patient Name: Aysha Waters  MRN: [id number]  Admission Date: 8/11/2020  Hospital Length of Stay: 0 days  Attending Provider: Elisabeth Pagan MD  Primary Care Provider: Jamila Smith MD  Follow-up For: Procedure(s) (LRB):  LAMINECTOMY, SPINE L3, L4, L5 (N/A)    Post-Operative Day: 1 Day Post-Op  Subjective:     Principal Problem:Lumbar disc herniation    Principal Orthopedic Problem:      Interval History:      Review of patient's allergies indicates:  No Known Allergies    Current Facility-Administered Medications   Medication    acetaminophen 1,000 mg/100 mL (10 mg/mL) injection 1,000 mg    acetaminophen tablet 650 mg    aluminum-magnesium hydroxide-simethicone 200-200-20 mg/5 mL suspension 30 mL    bisacodyL suppository 10 mg    diphenhydrAMINE capsule 50 mg    docusate sodium capsule 100 mg    electrolyte-S (ISOLYTE)    ethynodiol-ethinyl estradiol 1-50 mg-mcg per tablet 1 tablet    FLUoxetine capsule 20 mg    gabapentin capsule 300 mg    hydroCHLOROthiazide tablet 12.5 mg    hydromorphone (PF) injection 2 mg    HYDROmorphone PCA syringe 6 mg/30 mL (0.2 mg/mL) NS    lactated ringers infusion    lisinopriL tablet 40 mg    methocarbamoL tablet 500 mg    naloxone 0.4 mg/mL injection 0.02 mg    ondansetron disintegrating tablet 8 mg    oxyCODONE immediate release tablet 15 mg    oxyCODONE immediate release tablet 5 mg    oxyCODONE immediate release tablet Tab 10 mg    promethazine (PHENERGAN) 12.5 mg in dextrose 5 % 50 mL IVPB    senna-docusate 8.6-50 mg per tablet 2 tablet     Objective:     Vital Signs (Most Recent):  Temp: 98 °F (36.7 °C) (08/12/20 0406)  Pulse: 71 (08/12/20 0406)  Resp: 18 (08/12/20 0406)  BP: (!) 119/57 (08/12/20 0406)  SpO2: 99 % (08/12/20 0406) Vital Signs (24h Range):  Temp:  [96.1 °F (35.6 °C)-98.1 °F (36.7 °C)] 98 °F (36.7 °C)  Pulse:  [] 71  Resp:  [14-20] 18  SpO2:  [90 %-99 %] 99 %  BP:  "()/(52-78) 119/57     Weight: 111.1 kg (245 lb)  Height: 5' 6" (167.6 cm)  Body mass index is 39.54 kg/m².      Intake/Output Summary (Last 24 hours) at 8/12/2020 0748  Last data filed at 8/12/2020 0520  Gross per 24 hour   Intake 4191.67 ml   Output 4350 ml   Net -158.33 ml       General    Vitals reviewed.  Pulmonary/Chest: Effort normal.   Abdominal: Soft.   Neurological: She is alert.   Psychiatric: She has a normal mood and affect. Her behavior is normal.         Back (L-Spine & T-Spine) / Neck (C-Spine) Exam     Comments:  Motor exam grossly normal all major muscle groups of both lower extremities.        Significant Labs: All pertinent labs within the past 24 hours have been reviewed.    Significant Imaging: None    Assessment/Plan:     * Lumbar disc herniation  Impression:  1.  Stable postop multilevel lumbar laminectomy and diskectomy  2.  Stable following repair of incidental durotomy.  Patient at bed rest for 1st 24 hr following surgery  Plan:  Continued bed rest supine until noon today and then may begin ambulating.  Watch for any complaints or symptoms of cerebral spinal fluid leak such as headaches or drainage from incision.  Possible discharge home this afternoon if doing well but may need to be hospitalized until tomorrow          Nirav Spain MD  Orthopedics  [facility]  "

## 2020-08-12 NOTE — PLAN OF CARE
SW met with patient and patient's mother at bedside regarding CM/SW consult for home health services and DME.  NP ordered home health, rolling walker and bedside commode.  Patient and mother would like patient to work with PT before deciding on home health and/or DME ordered.  Per patient's nurse, patient will work with PT tomorrow.  Patient and mother aware.  Per Donna with [facility], patient insurance require authorization for equipment and she has submitted.       08/12/20 1410   Post-Acute Status   Post-Acute Authorization HME;Home Health   HME Status Pending Payor Review   Home Health Status Family Barriers

## 2020-08-12 NOTE — ASSESSMENT & PLAN NOTE
Impression:  1.  Stable postop multilevel lumbar laminectomy and diskectomy  2.  Stable following repair of incidental durotomy.  Patient at bed rest for 1st 24 hr following surgery  Plan:  Continued bed rest supine until noon today and then may begin ambulating.  Watch for any complaints or symptoms of cerebral spinal fluid leak such as headaches or drainage from incision.  Possible discharge home this afternoon if doing well but may need to be hospitalized until tomorrow

## 2020-08-12 NOTE — ASSESSMENT & PLAN NOTE
Chronic, controlled.  Latest blood pressure and vitals reviewed-   Temp:  [96.2 °F (35.7 °C)-98.1 °F (36.7 °C)]   Pulse:  [71-95]   Resp:  [12-20]   BP: (108-121)/(53-66)   SpO2:  [95 %-100 %] .   Home meds for hypertension were reviewed and noted below. Hospital anti-hypertensive changes were made as shown below.  Hypertension Medications             hydroCHLOROthiazide (HYDRODIURIL) 12.5 MG Tab Take 1 tablet (12.5 mg total) by mouth once daily.    lisinopril (PRINIVIL,ZESTRIL) 40 MG tablet Take 1 tablet (40 mg total) by mouth once daily.      Hospital Medications             hydroCHLOROthiazide tablet 12.5 mg 12.5 mg, Oral, Daily    lisinopriL tablet 40 mg 40 mg, Oral, Daily        Will utilize p.r.n. blood pressure medication only if patient's blood pressure greater than  180/110 and she develops symptoms such as worsening chest pain or shortness of breath.

## 2020-08-12 NOTE — SUBJECTIVE & OBJECTIVE
Interval History:  Notes reviewed, no acute events overnight.  Patient seen and examined this morning.  She remains supine due to CSF leak.  She is scheduled to begin sitting up at 12:00 p.m. today.  She denies acute pain and states her pain is controlled with PCA pump.  She is tolerating diet without complications.  Plan per Orthopedic is to start sitting up at 12:00 p.m. at 30° for 6 hr than 45° for 4 hr and then she can participate with PT OT.  Plan discussed with patient and if no complications or recurrent CSF leak plan is for discharge tomorrow.  Patient's mother is at bedside and verbalized understanding of plan.    Review of Systems   Constitutional: Negative for chills, fatigue and fever.   HENT: Negative for congestion, sinus pressure and trouble swallowing.    Eyes: Negative for photophobia and visual disturbance.   Respiratory: Negative for cough, chest tightness and shortness of breath.    Cardiovascular: Negative for chest pain, palpitations and leg swelling.   Gastrointestinal: Negative for abdominal pain, diarrhea, nausea and vomiting.   Musculoskeletal: Negative for arthralgias, back pain and gait problem.   Skin: Negative for color change, pallor, rash and wound.   Neurological: Negative for dizziness, weakness and light-headedness.   Psychiatric/Behavioral: Negative for agitation, behavioral problems and confusion.   All other systems reviewed and are negative.    Objective:     Vital Signs (Most Recent):  Temp: 96.8 °F (36 °C) (08/12/20 1138)  Pulse: 82 (08/12/20 1138)  Resp: 20 (08/12/20 1609)  BP: (!) 108/53 (08/12/20 1138)  SpO2: 95 % (08/12/20 1138) Vital Signs (24h Range):  Temp:  [96.2 °F (35.7 °C)-98.1 °F (36.7 °C)] 96.8 °F (36 °C)  Pulse:  [71-95] 82  Resp:  [12-20] 20  SpO2:  [95 %-100 %] 95 %  BP: (108-121)/(53-66) 108/53     Weight: 111.1 kg (245 lb)  Body mass index is 39.54 kg/m².    Intake/Output Summary (Last 24 hours) at 8/12/2020 6865  Last data filed at 8/12/2020 0520  Gross per  24 hour   Intake 1768.75 ml   Output 3000 ml   Net -1231.25 ml      Physical Exam  Vitals signs and nursing note reviewed.   Constitutional:       Appearance: She is well-developed.   HENT:      Head: Normocephalic and atraumatic.      Right Ear: External ear normal.      Left Ear: External ear normal.      Nose: Nose normal.      Mouth/Throat:      Mouth: Mucous membranes are moist.      Pharynx: Oropharynx is clear. No oropharyngeal exudate or posterior oropharyngeal erythema.   Eyes:      Conjunctiva/sclera: Conjunctivae normal.      Pupils: Pupils are equal, round, and reactive to light.   Neck:      Musculoskeletal: Normal range of motion and neck supple. No neck rigidity or muscular tenderness.      Vascular: No JVD.   Cardiovascular:      Rate and Rhythm: Normal rate and regular rhythm.      Pulses: Normal pulses.      Heart sounds: Normal heart sounds. No murmur.   Pulmonary:      Effort: Pulmonary effort is normal. No respiratory distress.      Breath sounds: Normal breath sounds. No stridor. No wheezing, rhonchi or rales.   Chest:      Chest wall: No tenderness.   Abdominal:      General: Bowel sounds are normal. There is no distension.      Palpations: Abdomen is soft.      Tenderness: There is no abdominal tenderness.   Genitourinary:     Comments: Barron in place with clear yellow urine  Musculoskeletal: Normal range of motion.         General: No swelling or tenderness.   Skin:     General: Skin is warm and dry.      Capillary Refill: Capillary refill takes 2 to 3 seconds.      Coloration: Skin is not jaundiced or pale.      Findings: No erythema or rash.   Neurological:      General: No focal deficit present.      Mental Status: She is alert and oriented to person, place, and time.      Cranial Nerves: No cranial nerve deficit.      Sensory: No sensory deficit.      Motor: No weakness.   Psychiatric:         Mood and Affect: Mood normal.         Behavior: Behavior normal.         Thought Content:  Thought content normal.         Significant Labs:   CBC:   Recent Labs   Lab 08/11/20  1302 08/12/20  0509   WBC 12.83* 11.65   HGB 10.2* 10.1*   HCT 31.8* 32.1*    282     CMP:   Recent Labs   Lab 08/11/20  1302 08/12/20  0509    139   K 4.1 3.9    104   CO2 23 24   * 100   BUN 7 5*   CREATININE 0.6 0.6   CALCIUM 8.0* 8.7   ANIONGAP 10 11   EGFRNONAA >60 >60     All pertinent labs within the past 24 hours have been reviewed.    Significant Imaging: I have reviewed all pertinent imaging results/findings within the past 24 hours.

## 2020-08-12 NOTE — PROGRESS NOTES
[facility] Medicine  Progress Note    Patient Name: Aysha Waters  MRN: [id number]  Patient Class: IP- Inpatient   Admission Date: 8/11/2020  Length of Stay: 0 days  Attending Physician: Elisabeth Pagan MD  Primary Care Provider: Jamila Smith MD        Subjective:     Principal Problem:Lumbar disc herniation        HPI:  Aysha Waters is a 19-year-old female with PMHx significant for obesity, HTN, PCOS, and back pain.  Pt is S/P LAMINECTOMY, SPINE L3, L4, L5 with Dr. Spain for treatment of disc herniation with intraoperative CSF leak.   Pt was evaluated by Dr. Spain preoperatively for right-sided lumbar lumbosacral pain with intermittent right lower extremity radiculitis dysesthesia to the foot.  Her symptoms started about 9 months ago.  Postoperatively, she complains of 5/10 back pain.  She denies any numbness or tingling.  She denies any chest pain, SOB, nausea, vomiting, or other complaints.  Pt is on bed rest for 24 hr post for seizure early due to CSF fluid leak.  She was placed in outpatient recovery under the service of hospital Medicine postoperatively.  Other pertinent medical Hx as below:    Overview/Hospital Course:  No notes on file    Interval History:  Notes reviewed, no acute events overnight.  Patient seen and examined this morning.  She remains supine due to CSF leak.  She is scheduled to begin sitting up at 12:00 p.m. today.  She denies acute pain and states her pain is controlled with PCA pump.  She is tolerating diet without complications.  Plan per Orthopedic is to start sitting up at 12:00 p.m. at 30° for 6 hr than 45° for 4 hr and then she can participate with PT OT.  Plan discussed with patient and if no complications or recurrent CSF leak plan is for discharge tomorrow.  Patient's mother is at bedside and verbalized understanding of plan.    Review of Systems   Constitutional: Negative for chills, fatigue and fever.   HENT: Negative for congestion, sinus  pressure and trouble swallowing.    Eyes: Negative for photophobia and visual disturbance.   Respiratory: Negative for cough, chest tightness and shortness of breath.    Cardiovascular: Negative for chest pain, palpitations and leg swelling.   Gastrointestinal: Negative for abdominal pain, diarrhea, nausea and vomiting.   Musculoskeletal: Negative for arthralgias, back pain and gait problem.   Skin: Negative for color change, pallor, rash and wound.   Neurological: Negative for dizziness, weakness and light-headedness.   Psychiatric/Behavioral: Negative for agitation, behavioral problems and confusion.   All other systems reviewed and are negative.    Objective:     Vital Signs (Most Recent):  Temp: 96.8 °F (36 °C) (08/12/20 1138)  Pulse: 82 (08/12/20 1138)  Resp: 20 (08/12/20 1609)  BP: (!) 108/53 (08/12/20 1138)  SpO2: 95 % (08/12/20 1138) Vital Signs (24h Range):  Temp:  [96.2 °F (35.7 °C)-98.1 °F (36.7 °C)] 96.8 °F (36 °C)  Pulse:  [71-95] 82  Resp:  [12-20] 20  SpO2:  [95 %-100 %] 95 %  BP: (108-121)/(53-66) 108/53     Weight: 111.1 kg (245 lb)  Body mass index is 39.54 kg/m².    Intake/Output Summary (Last 24 hours) at 8/12/2020 1714  Last data filed at 8/12/2020 0520  Gross per 24 hour   Intake 1768.75 ml   Output 3000 ml   Net -1231.25 ml      Physical Exam  Vitals signs and nursing note reviewed.   Constitutional:       Appearance: She is well-developed.   HENT:      Head: Normocephalic and atraumatic.      Right Ear: External ear normal.      Left Ear: External ear normal.      Nose: Nose normal.      Mouth/Throat:      Mouth: Mucous membranes are moist.      Pharynx: Oropharynx is clear. No oropharyngeal exudate or posterior oropharyngeal erythema.   Eyes:      Conjunctiva/sclera: Conjunctivae normal.      Pupils: Pupils are equal, round, and reactive to light.   Neck:      Musculoskeletal: Normal range of motion and neck supple. No neck rigidity or muscular tenderness.      Vascular: No JVD.    Cardiovascular:      Rate and Rhythm: Normal rate and regular rhythm.      Pulses: Normal pulses.      Heart sounds: Normal heart sounds. No murmur.   Pulmonary:      Effort: Pulmonary effort is normal. No respiratory distress.      Breath sounds: Normal breath sounds. No stridor. No wheezing, rhonchi or rales.   Chest:      Chest wall: No tenderness.   Abdominal:      General: Bowel sounds are normal. There is no distension.      Palpations: Abdomen is soft.      Tenderness: There is no abdominal tenderness.   Genitourinary:     Comments: Barron in place with clear yellow urine  Musculoskeletal: Normal range of motion.         General: No swelling or tenderness.   Skin:     General: Skin is warm and dry.      Capillary Refill: Capillary refill takes 2 to 3 seconds.      Coloration: Skin is not jaundiced or pale.      Findings: No erythema or rash.   Neurological:      General: No focal deficit present.      Mental Status: She is alert and oriented to person, place, and time.      Cranial Nerves: No cranial nerve deficit.      Sensory: No sensory deficit.      Motor: No weakness.   Psychiatric:         Mood and Affect: Mood normal.         Behavior: Behavior normal.         Thought Content: Thought content normal.         Significant Labs:   CBC:   Recent Labs   Lab 08/11/20  1302 08/12/20  0509   WBC 12.83* 11.65   HGB 10.2* 10.1*   HCT 31.8* 32.1*    282     CMP:   Recent Labs   Lab 08/11/20  1302 08/12/20  0509    139   K 4.1 3.9    104   CO2 23 24   * 100   BUN 7 5*   CREATININE 0.6 0.6   CALCIUM 8.0* 8.7   ANIONGAP 10 11   EGFRNONAA >60 >60     All pertinent labs within the past 24 hours have been reviewed.    Significant Imaging: I have reviewed all pertinent imaging results/findings within the past 24 hours.      Assessment/Plan:      * Lumbar disc herniation  POD 1 s/p LAMINECTOMY, SPINE L3, L4, L5  with Dr. Spain.  Pt with CSF fluid leak postoperatively.  Maintain bed rest until  12:00 p.m. today and then can start slowly raising head of bed per orthopedic recommendations  Pain control-currently on PCA  Continue care per orthopedic orders.      Ruptured lumbar intervertebral disc  As above      Essential hypertension  Chronic, controlled.  Latest blood pressure and vitals reviewed-   Temp:  [96.2 °F (35.7 °C)-98.1 °F (36.7 °C)]   Pulse:  [71-95]   Resp:  [12-20]   BP: (108-121)/(53-66)   SpO2:  [95 %-100 %] .   Home meds for hypertension were reviewed and noted below. Hospital anti-hypertensive changes were made as shown below.  Hypertension Medications             hydroCHLOROthiazide (HYDRODIURIL) 12.5 MG Tab Take 1 tablet (12.5 mg total) by mouth once daily.    lisinopril (PRINIVIL,ZESTRIL) 40 MG tablet Take 1 tablet (40 mg total) by mouth once daily.      Hospital Medications             hydroCHLOROthiazide tablet 12.5 mg 12.5 mg, Oral, Daily    lisinopriL tablet 40 mg 40 mg, Oral, Daily        Will utilize p.r.n. blood pressure medication only if patient's blood pressure greater than  180/110 and she develops symptoms such as worsening chest pain or shortness of breath.        Obesity  Body mass index is 39.54 kg/m².  Obesity compounds patient co-morbidities and complicates treatment course.  Counseling given regarding diet modification and exercise recommendations.          VTE Risk Mitigation (From admission, onward)         Ordered     IP VTE LOW RISK PATIENT  Once      08/11/20 1500     Place sequential compression device  Until discontinued      08/11/20 0730     Place CARIDAD hose  Until discontinued      08/11/20 0730                      Irene Garcia NP  Department of Hospital Medicine   [facility]

## 2020-08-12 NOTE — RESPIRATORY THERAPY
Patient averaging 2000ml on IS.  Hr 77, ETC02 47mmhg  And 02 saturation 100% on nc at 2lpm.  Patient instructed to perform IS every 1 hr.

## 2020-08-13 PROBLEM — R50.9 FEVER: Status: ACTIVE | Noted: 2020-08-13

## 2020-08-13 LAB
AMORPH CRY URNS QL MICRO: ABNORMAL
ANION GAP SERPL CALC-SCNC: 11 MMOL/L (ref 8–16)
BACTERIA #/AREA URNS HPF: ABNORMAL /HPF
BASOPHILS # BLD AUTO: 0.05 K/UL (ref 0–0.2)
BASOPHILS NFR BLD: 0.4 % (ref 0–1.9)
BILIRUB UR QL STRIP: NEGATIVE
BUN SERPL-MCNC: 8 MG/DL (ref 6–20)
CALCIUM SERPL-MCNC: 8.7 MG/DL (ref 8.7–10.5)
CHLORIDE SERPL-SCNC: 103 MMOL/L (ref 95–110)
CLARITY UR: CLEAR
CO2 SERPL-SCNC: 25 MMOL/L (ref 23–29)
COLOR UR: YELLOW
CREAT SERPL-MCNC: 0.8 MG/DL (ref 0.5–1.4)
DIFFERENTIAL METHOD: ABNORMAL
EOSINOPHIL # BLD AUTO: 0.2 K/UL (ref 0–0.5)
EOSINOPHIL NFR BLD: 1.4 % (ref 0–8)
ERYTHROCYTE [DISTWIDTH] IN BLOOD BY AUTOMATED COUNT: 13.5 % (ref 11.5–14.5)
EST. GFR  (AFRICAN AMERICAN): >60 ML/MIN/1.73 M^2
EST. GFR  (NON AFRICAN AMERICAN): >60 ML/MIN/1.73 M^2
GLUCOSE SERPL-MCNC: 104 MG/DL (ref 70–110)
GLUCOSE UR QL STRIP: NEGATIVE
HCT VFR BLD AUTO: 33.5 % (ref 37–48.5)
HGB BLD-MCNC: 10.3 G/DL (ref 12–16)
HGB UR QL STRIP: ABNORMAL
IMM GRANULOCYTES # BLD AUTO: 0.1 K/UL (ref 0–0.04)
IMM GRANULOCYTES NFR BLD AUTO: 0.7 % (ref 0–0.5)
KETONES UR QL STRIP: ABNORMAL
LEUKOCYTE ESTERASE UR QL STRIP: NEGATIVE
LYMPHOCYTES # BLD AUTO: 4.2 K/UL (ref 1–4.8)
LYMPHOCYTES NFR BLD: 30.1 % (ref 18–48)
MCH RBC QN AUTO: 25.7 PG (ref 27–31)
MCHC RBC AUTO-ENTMCNC: 30.7 G/DL (ref 32–36)
MCV RBC AUTO: 84 FL (ref 82–98)
MICROSCOPIC COMMENT: ABNORMAL
MONOCYTES # BLD AUTO: 1 K/UL (ref 0.3–1)
MONOCYTES NFR BLD: 7.5 % (ref 4–15)
NEUTROPHILS # BLD AUTO: 8.3 K/UL (ref 1.8–7.7)
NEUTROPHILS NFR BLD: 59.9 % (ref 38–73)
NITRITE UR QL STRIP: NEGATIVE
NRBC BLD-RTO: 0 /100 WBC
PH UR STRIP: 6 [PH] (ref 5–8)
PLATELET # BLD AUTO: 345 K/UL (ref 150–350)
PMV BLD AUTO: 9.9 FL (ref 9.2–12.9)
POTASSIUM SERPL-SCNC: 3.5 MMOL/L (ref 3.5–5.1)
PROT UR QL STRIP: ABNORMAL
RBC # BLD AUTO: 4.01 M/UL (ref 4–5.4)
RBC #/AREA URNS HPF: 5 /HPF (ref 0–4)
SODIUM SERPL-SCNC: 139 MMOL/L (ref 136–145)
SP GR UR STRIP: 1.02 (ref 1–1.03)
URN SPEC COLLECT METH UR: ABNORMAL
UROBILINOGEN UR STRIP-ACNC: NEGATIVE EU/DL
WBC # BLD AUTO: 13.83 K/UL (ref 3.9–12.7)
WBC #/AREA URNS HPF: 0 /HPF (ref 0–5)

## 2020-08-13 PROCEDURE — 81000 URINALYSIS NONAUTO W/SCOPE: CPT

## 2020-08-13 PROCEDURE — 97116 GAIT TRAINING THERAPY: CPT

## 2020-08-13 PROCEDURE — 36415 COLL VENOUS BLD VENIPUNCTURE: CPT

## 2020-08-13 PROCEDURE — 12000002 HC ACUTE/MED SURGE SEMI-PRIVATE ROOM

## 2020-08-13 PROCEDURE — 87086 URINE CULTURE/COLONY COUNT: CPT

## 2020-08-13 PROCEDURE — 87040 BLOOD CULTURE FOR BACTERIA: CPT

## 2020-08-13 PROCEDURE — 94799 UNLISTED PULMONARY SVC/PX: CPT

## 2020-08-13 PROCEDURE — 94761 N-INVAS EAR/PLS OXIMETRY MLT: CPT

## 2020-08-13 PROCEDURE — 25000003 PHARM REV CODE 250: Performed by: PHYSICIAN ASSISTANT

## 2020-08-13 PROCEDURE — 25000003 PHARM REV CODE 250: Performed by: ORTHOPAEDIC SURGERY

## 2020-08-13 PROCEDURE — 80048 BASIC METABOLIC PNL TOTAL CA: CPT

## 2020-08-13 PROCEDURE — 97530 THERAPEUTIC ACTIVITIES: CPT

## 2020-08-13 PROCEDURE — 97162 PT EVAL MOD COMPLEX 30 MIN: CPT

## 2020-08-13 PROCEDURE — 63600175 PHARM REV CODE 636 W HCPCS: Performed by: PHYSICIAN ASSISTANT

## 2020-08-13 PROCEDURE — 85025 COMPLETE CBC W/AUTO DIFF WBC: CPT

## 2020-08-13 PROCEDURE — 25000003 PHARM REV CODE 250: Performed by: NURSE PRACTITIONER

## 2020-08-13 RX ORDER — SODIUM CHLORIDE 9 MG/ML
INJECTION, SOLUTION INTRAVENOUS CONTINUOUS
Status: DISCONTINUED | OUTPATIENT
Start: 2020-08-13 | End: 2020-08-14 | Stop reason: HOSPADM

## 2020-08-13 RX ADMIN — OXYCODONE HYDROCHLORIDE 10 MG: 10 TABLET ORAL at 01:08

## 2020-08-13 RX ADMIN — ACETAMINOPHEN 650 MG: 325 TABLET ORAL at 02:08

## 2020-08-13 RX ADMIN — HYDROMORPHONE HYDROCHLORIDE 2 MG: 2 INJECTION INTRAMUSCULAR; INTRAVENOUS; SUBCUTANEOUS at 09:08

## 2020-08-13 RX ADMIN — DOCUSATE SODIUM 100 MG: 100 CAPSULE, LIQUID FILLED ORAL at 08:08

## 2020-08-13 RX ADMIN — OXYCODONE HYDROCHLORIDE 15 MG: 10 TABLET ORAL at 03:08

## 2020-08-13 RX ADMIN — SODIUM CHLORIDE: 0.9 INJECTION, SOLUTION INTRAVENOUS at 11:08

## 2020-08-13 RX ADMIN — OXYCODONE 5 MG: 5 TABLET ORAL at 08:08

## 2020-08-13 RX ADMIN — DOCUSATE SODIUM 50 MG AND SENNOSIDES 8.6 MG 2 TABLET: 8.6; 5 TABLET, FILM COATED ORAL at 08:08

## 2020-08-13 RX ADMIN — METHOCARBAMOL TABLETS 500 MG: 500 TABLET, COATED ORAL at 04:08

## 2020-08-13 RX ADMIN — METHOCARBAMOL TABLETS 500 MG: 500 TABLET, COATED ORAL at 12:08

## 2020-08-13 RX ADMIN — BISACODYL 10 MG: 10 SUPPOSITORY RECTAL at 08:08

## 2020-08-13 RX ADMIN — SODIUM CHLORIDE: 0.9 INJECTION, SOLUTION INTRAVENOUS at 03:08

## 2020-08-13 RX ADMIN — GABAPENTIN 300 MG: 300 CAPSULE ORAL at 08:08

## 2020-08-13 RX ADMIN — METHOCARBAMOL TABLETS 500 MG: 500 TABLET, COATED ORAL at 08:08

## 2020-08-13 RX ADMIN — HYDROCHLOROTHIAZIDE 12.5 MG: 12.5 TABLET ORAL at 08:08

## 2020-08-13 RX ADMIN — HYDROMORPHONE HYDROCHLORIDE 2 MG: 2 INJECTION INTRAMUSCULAR; INTRAVENOUS; SUBCUTANEOUS at 04:08

## 2020-08-13 RX ADMIN — FLUOXETINE HYDROCHLORIDE 20 MG: 20 CAPSULE ORAL at 08:08

## 2020-08-13 NOTE — ASSESSMENT & PLAN NOTE
Patient had episode of mild hypotension during the night that improved with IV fluids  Will hold all antihypertensives and HCTZ today    Chronic, controlled.  Latest blood pressure and vitals reviewed-   Temp:  [97.3 °F (36.3 °C)-102.3 °F (39.1 °C)]   Pulse:  []   Resp:  [14-20]   BP: ()/(50-58)   SpO2:  [91 %-99 %] .   Home meds for hypertension were reviewed and noted below. Hospital anti-hypertensive changes were made as shown below.  Hypertension Medications             hydroCHLOROthiazide (HYDRODIURIL) 12.5 MG Tab Take 1 tablet (12.5 mg total) by mouth once daily.    lisinopril (PRINIVIL,ZESTRIL) 40 MG tablet Take 1 tablet (40 mg total) by mouth once daily.      Hospital Medications             hydroCHLOROthiazide tablet 12.5 mg 12.5 mg, Oral, Daily    lisinopriL tablet 40 mg 40 mg, Oral, Daily        Will utilize p.r.n. blood pressure medication only if patient's blood pressure greater than  180/110 and she develops symptoms such as worsening chest pain or shortness of breath.

## 2020-08-13 NOTE — SUBJECTIVE & OBJECTIVE
Interval History:  Notes reviewed.  Patient experienced episode of hypotension and fever with tachycardia last night.  Symptoms improved after IV fluids and Tylenol.  Patient sitting up 90° in bed.  She is having lower back pain that is controlled with oral pain medicine.  Mother at bedside.  I advised patient and mother that I have obtained blood cultures, urinanalysis, urine culture and chest x-ray.  Advised patient on plan of care for today including working with PT/OT and patient verbalized understanding.    Review of Systems   Constitutional: Positive for chills and fever. Negative for fatigue.   HENT: Negative for congestion, sinus pressure and sore throat.    Respiratory: Negative for cough, chest tightness, shortness of breath and wheezing.    Cardiovascular: Negative for chest pain, palpitations and leg swelling.   Gastrointestinal: Negative for abdominal pain, diarrhea, nausea and vomiting.   Musculoskeletal: Positive for back pain. Negative for arthralgias and myalgias.   Skin: Negative for color change, pallor and rash.   Neurological: Negative for dizziness, weakness and light-headedness.   Psychiatric/Behavioral: Negative for agitation, behavioral problems and confusion.   All other systems reviewed and are negative.    Objective:     Vital Signs (Most Recent):  Temp: 99.7 °F (37.6 °C) (08/13/20 0747)  Pulse: 110 (08/13/20 1357)  Resp: 16 (08/13/20 1357)  BP: (!) 114/55 (08/13/20 0904)  SpO2: 98 % (08/13/20 1357) Vital Signs (24h Range):  Temp:  [97.3 °F (36.3 °C)-102.3 °F (39.1 °C)] 99.7 °F (37.6 °C)  Pulse:  [] 110  Resp:  [14-20] 16  SpO2:  [91 %-99 %] 98 %  BP: ()/(50-58) 114/55     Weight: 111.1 kg (245 lb)  Body mass index is 39.54 kg/m².    Intake/Output Summary (Last 24 hours) at 8/13/2020 1443  Last data filed at 8/13/2020 0600  Gross per 24 hour   Intake 1431.67 ml   Output 1000 ml   Net 431.67 ml      Physical Exam  Vitals signs and nursing note reviewed.   Constitutional:        General: She is not in acute distress.     Appearance: Normal appearance. She is well-developed. She is obese. She is not ill-appearing or diaphoretic.   HENT:      Head: Normocephalic and atraumatic.      Right Ear: External ear normal.      Left Ear: External ear normal.      Nose: Nose normal. No congestion or rhinorrhea.      Mouth/Throat:      Mouth: Mucous membranes are moist.      Pharynx: Oropharynx is clear. No oropharyngeal exudate or posterior oropharyngeal erythema.   Eyes:      General: No scleral icterus.        Right eye: No discharge.         Left eye: No discharge.      Conjunctiva/sclera: Conjunctivae normal.      Pupils: Pupils are equal, round, and reactive to light.   Neck:      Musculoskeletal: Normal range of motion and neck supple. No neck rigidity or muscular tenderness.      Vascular: No JVD.   Cardiovascular:      Rate and Rhythm: Normal rate and regular rhythm.      Pulses: Normal pulses.      Heart sounds: Normal heart sounds. No murmur.   Pulmonary:      Effort: Pulmonary effort is normal. No respiratory distress.      Breath sounds: Normal breath sounds. No stridor. No wheezing, rhonchi or rales.   Chest:      Chest wall: No tenderness.   Abdominal:      General: Bowel sounds are normal. There is no distension.      Palpations: Abdomen is soft.      Tenderness: There is no abdominal tenderness. There is no guarding.   Genitourinary:     Comments: Barron draining clear yellow urine  Musculoskeletal: Normal range of motion.         General: No swelling or tenderness.   Lymphadenopathy:      Cervical: No cervical adenopathy.   Skin:     General: Skin is warm and dry.      Capillary Refill: Capillary refill takes 2 to 3 seconds.      Coloration: Skin is not jaundiced or pale.      Findings: No erythema or rash.      Comments: Surgical dressing to back clean dry and intact   Neurological:      General: No focal deficit present.      Mental Status: She is alert and oriented to person, place,  and time.      Sensory: No sensory deficit.      Motor: No weakness.   Psychiatric:         Mood and Affect: Mood normal.         Behavior: Behavior normal.         Thought Content: Thought content normal.         Significant Labs:   CBC:   Recent Labs   Lab 08/12/20 0509 08/13/20 0459   WBC 11.65 13.83*   HGB 10.1* 10.3*   HCT 32.1* 33.5*    345     CMP:   Recent Labs   Lab 08/12/20 0509 08/13/20 0459    139   K 3.9 3.5    103   CO2 24 25    104   BUN 5* 8   CREATININE 0.6 0.8   CALCIUM 8.7 8.7   ANIONGAP 11 11   EGFRNONAA >60 >60     All pertinent labs within the past 24 hours have been reviewed.    Significant Imaging: I have reviewed all pertinent imaging results/findings within the past 24 hours.

## 2020-08-13 NOTE — ASSESSMENT & PLAN NOTE
Patient developed fever last night that resolved with Tylenol  Blood cultures, urinalysis, urine culture and chest x-ray ordered

## 2020-08-13 NOTE — PLAN OF CARE
POC discussed with pt, pt verbalized understanding. Oriented x4. PIV cdi, NS infusing. Neurovascular checks done, no changes. Incision on back cdi. Barron draining clear yellow urine. SCD's and garfield in place. HOB changed per orders as pt would allow. Complained of pain, medicated per orders, pt states pain is remaining around 7. Monitoring temp and blood pressure really closely. Call light in reach, bed alarm set, safety maintained. No complaints or requests at this time, will continue to monitor.

## 2020-08-13 NOTE — PROGRESS NOTES
"[facility]  Orthopedics  Progress Note    Patient Name: Aysha Waters  MRN: [id number]  Admission Date: 8/11/2020  Hospital Length of Stay: 1 days  Attending Provider: Elisabeth Pagan MD  Primary Care Provider: Jamila Smith MD  Follow-up For: Procedure(s) (LRB):  LAMINECTOMY, SPINE L3, L4, L5 (N/A)    Post-Operative Day: 2 Days Post-Op  Subjective:     Principal Problem:Lumbar disc herniation    Principal Orthopedic Problem: S/P multilevel lumbar lami    Interval History: Denies N/V and HA with sitting upright    Review of patient's allergies indicates:  No Known Allergies    Current Facility-Administered Medications   Medication    0.9%  NaCl infusion    acetaminophen tablet 650 mg    aluminum-magnesium hydroxide-simethicone 200-200-20 mg/5 mL suspension 30 mL    bisacodyL suppository 10 mg    diphenhydrAMINE capsule 50 mg    docusate sodium capsule 100 mg    electrolyte-S (ISOLYTE)    ethynodiol-ethinyl estradiol 1-50 mg-mcg per tablet 1 tablet    FLUoxetine capsule 20 mg    gabapentin capsule 300 mg    hydromorphone (PF) injection 2 mg    HYDROmorphone PCA syringe 6 mg/30 mL (0.2 mg/mL) NS    methocarbamoL tablet 500 mg    naloxone 0.4 mg/mL injection 0.02 mg    ondansetron disintegrating tablet 8 mg    oxyCODONE immediate release tablet 15 mg    oxyCODONE immediate release tablet 5 mg    oxyCODONE immediate release tablet Tab 10 mg    promethazine (PHENERGAN) 12.5 mg in dextrose 5 % 50 mL IVPB    senna-docusate 8.6-50 mg per tablet 2 tablet     Objective:     Vital Signs (Most Recent):  Temp: 99.7 °F (37.6 °C) (08/13/20 0747)  Pulse: 101 (08/13/20 0904)  Resp: 16 (08/13/20 0904)  BP: (!) 114/55 (08/13/20 0904)  SpO2: 95 % (08/13/20 0904) Vital Signs (24h Range):  Temp:  [96.8 °F (36 °C)-102.3 °F (39.1 °C)] 99.7 °F (37.6 °C)  Pulse:  [] 101  Resp:  [14-20] 16  SpO2:  [91 %-99 %] 95 %  BP: ()/(50-58) 114/55     Weight: 111.1 kg (245 lb)  Height: 5' 6" (167.6 " cm)  Body mass index is 39.54 kg/m².      Intake/Output Summary (Last 24 hours) at 8/13/2020 1011  Last data filed at 8/13/2020 0600  Gross per 24 hour   Intake 1671.67 ml   Output 2650 ml   Net -978.33 ml       General    Nursing note and vitals reviewed.  Constitutional: She is oriented to person, place, and time.   obese   Pulmonary/Chest: Effort normal.   Neurological: She is alert and oriented to person, place, and time.   Psychiatric: She has a normal mood and affect. Her behavior is normal.   Flat affect         Back (L-Spine & T-Spine) / Neck (C-Spine) Exam     Comments:  Incision/ dressing C/D/I. Barron in place.        Significant Labs:   CBC:   Recent Labs   Lab 08/11/20  1302 08/12/20  0509 08/13/20  0459   WBC 12.83* 11.65 13.83*   HGB 10.2* 10.1* 10.3*   HCT 31.8* 32.1* 33.5*    282 345     CMP:   Recent Labs   Lab 08/11/20  1302 08/12/20  0509 08/13/20  0459    139 139   K 4.1 3.9 3.5    104 103   CO2 23 24 25   * 100 104   BUN 7 5* 8   CREATININE 0.6 0.6 0.8   CALCIUM 8.0* 8.7 8.7   ANIONGAP 10 11 11   EGFRNONAA >60 >60 >60     All pertinent labs within the past 24 hours have been reviewed.    Significant Imaging: None    Assessment/Plan:     * Lumbar disc herniation  DC Barron now  OOB with PT and nursing  Plan for DC home today          OPAL BUNCH  Orthopedics  [facility]

## 2020-08-13 NOTE — RESPIRATORY THERAPY
08/12/20 1945   Patient Assessment/Suction   Level of Consciousness (AVPU) alert   Respiratory Effort Unlabored   Expansion/Accessory Muscles/Retractions no use of accessory muscles   All Lung Fields Breath Sounds clear   Rhythm/Pattern, Respiratory unlabored   Cough Frequency infrequent   Cough Type nonproductive   PRE-TX-O2   O2 Device (Oxygen Therapy) room air   SpO2 99 %   Pulse Oximetry Type Intermittent   $ Pulse Oximetry - Multiple Charge Pulse Oximetry - Multiple   Pulse 92   Resp 16   Incentive Spirometer   $ Incentive Spirometer Charges done with encouragement   Incentive Spirometer Predicted Level (mL) 2700   Administration (IS) proper technique demonstrated   Number of Repetitions (IS) 10   Level Incentive Spirometer (mL) 1750   Patient Tolerance (IS) good

## 2020-08-13 NOTE — PLAN OF CARE
Purposeful rounding every two hours this shift to ensure patient safety and comfort. F/C dcd, has voided 200 cc clear yellow urine. VSS, medicated for pain throughout shift as ordered. Up with PT x 2 this shift.

## 2020-08-13 NOTE — NURSING
Pt blood pressure low. IV pain meds held. Consistently checked BP, not increased enough for IV pain meds.

## 2020-08-13 NOTE — PT/OT/SLP EVAL
Physical Therapy Evaluation    Patient Name:  Aysha Waters   MRN:  [id number]    Recommendations:     Discharge Recommendations:  home, home health PT   Discharge Equipment Recommendations: walker, rolling   Barriers to discharge: None    Assessment:     Aysha Waters is a 19 y.o. female admitted with a medical diagnosis of Lumbar disc herniation.  She presents with the following impairments/functional limitations:  impaired endurance, impaired functional mobilty, gait instability, decreased safety awareness, pain, edema, orthopedic precautions. Pt tolerated treatment only fairly with muscle guarding and decreased motivation for mobility due to back and R LE pain. Pt seems unable/unwilling to push through pain in order to increase mobility. Pt was diaphoretic and nauseous sitting EOB and vomited a significant amount prior to being able to stand and side step toward HOB.     Rehab Prognosis: Good and , but depends on pt compliance with recommendation to increase mobility levels with frequent ambulation; patient would benefit from acute skilled PT services to address these deficits and reach maximum level of function.    Recent Surgery: Procedure(s) (LRB):  LAMINECTOMY, SPINE L3, L4, L5 (N/A) 2 Days Post-Op    Plan:     During this hospitalization, patient to be seen (BID Monday- Friday, QD Saturday and Sunday) to address the identified rehab impairments via gait training, therapeutic activities and progress toward the following goals:    · Plan of Care Expires:  08/20/20    Subjective     Chief Complaint: Back/R LE radicular pain, nausea, lightheadedness and HA.  Patient/Family Comments/goals: To feel better.  Pain/Comfort:  · Pain Rating 1: 7/10  · Location - Side 1: Right  · Location 1: back(radiating into R LE)  · Pain Rating Post-Intervention 1: 8/10    Patients cultural, spiritual, Jehovah's witness conflicts given the current situation:      Living Environment:  Pt lives with her parents in a single story house  with 1 step entry.  Prior to admission, patients level of function was Modified independent to Independent, but with pain.  Equipment used at home: walker, rolling(pt used a borrowed RW prior to surgery).  DME owned (not currently used): none.  Upon discharge, patient will have assistance from her parents.    Objective:     Communicated with AURA Nina prior to session.  Patient found HOB elevated with kerr catheter, peripheral IV, SCD  upon PT entry to room.    General Precautions: Standard, fall   Orthopedic Precautions:spinal precautions   Braces: N/A     Exams:  · Cognitive Exam:  Patient is oriented to Person, Place, Time and Situation  · RLE ROM: WFL  · RLE Strength: grossly 4-/5  · LLE ROM: WFL  · LLE Strength: grossly 4-/5    Functional Mobility:  · Bed Mobility:     · Rolling Left:  moderate assistance  · Rolling Right: moderate assistance and maximal assistance(for RN to insert suppository)  · Supine to Sit: moderate assistance  · Sit to Supine: moderate assistance and of 1-2 persons  · Transfers:     · Sit to Stand:  minimum assistance, moderate assistance and of 2 persons with rolling walker  · Gait: pt side stepped x 3 steps toward HOB with min A of 1-2 persons to steady    Therapeutic Activities and Exercises:   Pt sat EOB x approximately 20 min with max encouragement to stand with RW, became nauseous and vomited. Pt's BP sitting /58 with SPO2 96% on RA.     AM-PAC 6 CLICK MOBILITY  Total Score:12     Patient left HOB elevated with all lines intact, call button in reach, RN notified and pt's mother present.    GOALS:   Multidisciplinary Problems     Physical Therapy Goals        Problem: Physical Therapy Goal    Goal Priority Disciplines Outcome Goal Variances Interventions   Physical Therapy Goal     PT, PT/OT      Description: Goals to be met by: 20     Patient will increase functional independence with mobility by performin. Supine to sit with Stand-by Assistance  2. Sit to stand  transfer with Stand-by Assistance  3. Bed to chair transfer with Contact Guard Assistance using Rolling Walker  4. Gait  x 100 feet with Contact Guard Assistance using Rolling Walker.                      History:     Past Medical History:   Diagnosis Date    Anxiety     Depression     Hypertension     Insulin resistance     Knee derangement     Obesity     PCOS (polycystic ovarian syndrome)     Pinched nerve        Past Surgical History:   Procedure Laterality Date    KNEE ARTHROSCOPY, MEDIAL PATELLO FEMORAL LIGAMENT REPAIR      LAMINECTOMY N/A 8/11/2020    Procedure: LAMINECTOMY, SPINE L3, L4, L5;  Surgeon: Nirav Spain MD;  [facility];  Service: Orthopedics;  Laterality: N/A;    MOUTH SURGERY      petalla surgery Left 07/2019    medically broke the tib/fib and moved the pettella, no replacement  Dr [facility]    TONSILLECTOMY         Time Tracking:     PT Received On: 08/13/20  PT Start Time: 0958     PT Stop Time: 1040  PT Total Time (min): 42 min     Billable Minutes: Evaluation 12 and Therapeutic Activity 30      Alison Alejo, MARGRET  08/13/2020

## 2020-08-13 NOTE — ASSESSMENT & PLAN NOTE
POD 2 s/p LAMINECTOMY, SPINE L3, L4, L5  with Dr. Spain.  Pt with CSF fluid leak postoperatively.    Bedrest completed and patient to start ambulating with PT OT today  Dressing to back clean dry and intact  Pain control-PCA discontinued oral pain medicine initiated  Continue care per orthopedic orders.

## 2020-08-13 NOTE — NURSING
Pt was to have cirilo d/c when she was able to sit at 90 degrees without a h/a or nausea. Attempted to increase pts bed throughout the night, but she kept refusing due to increased back pain with movement. Pt sitting at 45 degrees at the end of my shift. Cirilo still in. Charge nurse aware. Pt needs to be revaluated when pain decreases.

## 2020-08-13 NOTE — PLAN OF CARE
Problem: Physical Therapy Goal  Goal: Physical Therapy Goal  Description: Goals to be met by: 20     Patient will increase functional independence with mobility by performin. Supine to sit with Stand-by Assistance  2. Sit to stand transfer with Stand-by Assistance  3. Bed to chair transfer with Contact Guard Assistance using Rolling Walker  4. Gait  x 100 feet with Contact Guard Assistance using Rolling Walker.     Outcome: Ongoing, Progressing

## 2020-08-13 NOTE — PROGRESS NOTES
KLAUDIA Conrad, NP notified.       Fluid orders obtained and administered.          08/13/20 0044 08/13/20 0219   Vital Signs   Temp 99.3 °F (37.4 °C) (!) 102.3 °F (39.1 °C)   Pulse (!) 112  --    Resp 14  --    SpO2 96 %  --    BP (!) 99/54  --    MAP (mmHg) 74  --

## 2020-08-13 NOTE — PT/OT/SLP PROGRESS
Physical Therapy Treatment    Patient Name:  Aysha Waters   MRN:  [id number]    Recommendations:     Discharge Recommendations:  rehabilitation facility, home health PT   Discharge Equipment Recommendations: walker, rolling   Barriers to discharge: Pt had a set back yesterday due to CSF leak, unable to get up oob for 24 hours. Pt improved this pm vs am and if continues to improve tomorrow should be able to go home with  PT and RW.    Assessment:     Aysha Waters is a 19 y.o. female admitted with a medical diagnosis of Lumbar disc herniation.  She presents with the following impairments/functional limitations:  impaired endurance, impaired functional mobilty, gait instability, pain, edema, orthopedic precautions. Pt tolerated treatment better this pm, able to transfer [place] [facility] and walk short distance in room.    Rehab Prognosis: Fair > Good; patient would benefit from acute skilled PT services to address these deficits and reach maximum level of function.    Recent Surgery: Procedure(s) (LRB):  LAMINECTOMY, SPINE L3, L4, L5 (N/A) 2 Days Post-Op    Plan:     During this hospitalization, patient to be seen BID(BID Monday- Friday, QD Saturday/Sunday) to address the identified rehab impairments via gait training, therapeutic activities and progress toward the following goals:    · Plan of Care Expires:  08/20/20    Subjective     Chief Complaint: headache  Patient/Family Comments/goals: Pt would prefer to go home with home health, but has a low pain tolerance and has difficulty pushing herself.  Pain/Comfort:  · Pain Rating 1: 6/10  · Location - Side 1: Right  · Location 1: head  · Pain Addressed 1: Pre-medicate for activity, Reposition, Distraction, Nurse notified  · Pain Rating Post-Intervention 1: 6/10      Objective:     Communicated with AURA Nina prior to session.  Patient found HOB elevated with peripheral IV, SCD upon PT entry to room.     General Precautions: Standard, fall   Orthopedic Precautions:spinal  precautions   Braces: N/A     Functional Mobility:  · Bed Mobility:     · Supine to Sit: minimum assistance and from HOB elevated position  · Sit to Supine: minimum assistance of 1-2 persons and with bed flat  · Transfers:     · Sit to Stand:  minimum assistance and of 2 persons with rolling walker  · Gait: x 15 feet with RW and CGA x 2 for safety plus vc for encouragement.      AM-PAC 6 CLICK MOBILITY  Turning over in bed (including adjusting bedclothes, sheets and blankets)?: 3  Sitting down on and standing up from a chair with arms (e.g., wheelchair, bedside commode, etc.): 3  Moving from lying on back to sitting on the side of the bed?: 3  Moving to and from a bed to a chair (including a wheelchair)?: 3  Need to walk in hospital room?: 3  Climbing 3-5 steps with a railing?: 3  Basic Mobility Total Score: 18       Therapeutic Activities and Exercises:   Pt sat on BSC a few minutes attempting to urinate and have BM, but not successful.    PT educated pt and her mother about harmful effects of bedrest and encouraged that pt should move frequently at home, walk every 1-2 hours and educated on log rolling and proper back mechanics. PT recommended pt would benefit from outpatient PT at some point for Dynamic Stabilization exercise program since pt is so young with disc problems to prevent future injuries.    Patient left left sidelying with all lines intact, call button in reach and RN and pt's mother present..    GOALS:   Multidisciplinary Problems     Physical Therapy Goals        Problem: Physical Therapy Goal    Goal Priority Disciplines Outcome Goal Variances Interventions   Physical Therapy Goal     PT, PT/OT Ongoing, Progressing     Description: Goals to be met by: 20     Patient will increase functional independence with mobility by performin. Supine to sit with Stand-by Assistance  2. Sit to stand transfer with Stand-by Assistance  3. Bed to chair transfer with Contact Guard Assistance using  Rolling Walker  4. Gait  x 100 feet with Contact Guard Assistance using Rolling Walker.                      Time Tracking:     PT Received On: 08/13/20  PT Start Time: 1355     PT Stop Time: 1435  PT Total Time (min): 40 min     Billable Minutes: Gait Training 10 and Therapeutic Activity 30    Treatment Type: Treatment  PT/PTA: PT           Alison Alejo, PT  08/13/2020

## 2020-08-13 NOTE — PROGRESS NOTES
[facility] Medicine  Progress Note    Patient Name: Aysha Waters  MRN: [id number]  Patient Class: IP- Inpatient   Admission Date: 8/11/2020  Length of Stay: 1 days  Attending Physician: Elisabeth Pagan MD  Primary Care Provider: Jamila Smith MD        Subjective:     Principal Problem:Lumbar disc herniation        HPI:  Aysha Waters is a 19-year-old female with PMHx significant for obesity, HTN, PCOS, and back pain.  Pt is S/P LAMINECTOMY, SPINE L3, L4, L5 with Dr. Spain for treatment of disc herniation with intraoperative CSF leak.   Pt was evaluated by Dr. Spain preoperatively for right-sided lumbar lumbosacral pain with intermittent right lower extremity radiculitis dysesthesia to the foot.  Her symptoms started about 9 months ago.  Postoperatively, she complains of 5/10 back pain.  She denies any numbness or tingling.  She denies any chest pain, SOB, nausea, vomiting, or other complaints.  Pt is on bed rest for 24 hr post for seizure early due to CSF fluid leak.  She was placed in outpatient recovery under the service of hospital Medicine postoperatively.  Other pertinent medical Hx as below:    Overview/Hospital Course:  No notes on file    Interval History:  Notes reviewed.  Patient experienced episode of hypotension and fever with tachycardia last night.  Symptoms improved after IV fluids and Tylenol.  Patient sitting up 90° in bed.  She is having lower back pain that is controlled with oral pain medicine.  Mother at bedside.  I advised patient and mother that I have obtained blood cultures, urinanalysis, urine culture and chest x-ray.  Advised patient on plan of care for today including working with PT/OT and patient verbalized understanding.    Review of Systems   Constitutional: Positive for chills and fever. Negative for fatigue.   HENT: Negative for congestion, sinus pressure and sore throat.    Respiratory: Negative for cough, chest tightness, shortness of breath and  wheezing.    Cardiovascular: Negative for chest pain, palpitations and leg swelling.   Gastrointestinal: Negative for abdominal pain, diarrhea, nausea and vomiting.   Musculoskeletal: Positive for back pain. Negative for arthralgias and myalgias.   Skin: Negative for color change, pallor and rash.   Neurological: Negative for dizziness, weakness and light-headedness.   Psychiatric/Behavioral: Negative for agitation, behavioral problems and confusion.   All other systems reviewed and are negative.    Objective:     Vital Signs (Most Recent):  Temp: 99.7 °F (37.6 °C) (08/13/20 0747)  Pulse: 110 (08/13/20 1357)  Resp: 16 (08/13/20 1357)  BP: (!) 114/55 (08/13/20 0904)  SpO2: 98 % (08/13/20 1357) Vital Signs (24h Range):  Temp:  [97.3 °F (36.3 °C)-102.3 °F (39.1 °C)] 99.7 °F (37.6 °C)  Pulse:  [] 110  Resp:  [14-20] 16  SpO2:  [91 %-99 %] 98 %  BP: ()/(50-58) 114/55     Weight: 111.1 kg (245 lb)  Body mass index is 39.54 kg/m².    Intake/Output Summary (Last 24 hours) at 8/13/2020 1443  Last data filed at 8/13/2020 0600  Gross per 24 hour   Intake 1431.67 ml   Output 1000 ml   Net 431.67 ml      Physical Exam  Vitals signs and nursing note reviewed.   Constitutional:       General: She is not in acute distress.     Appearance: Normal appearance. She is well-developed. She is obese. She is not ill-appearing or diaphoretic.   HENT:      Head: Normocephalic and atraumatic.      Right Ear: External ear normal.      Left Ear: External ear normal.      Nose: Nose normal. No congestion or rhinorrhea.      Mouth/Throat:      Mouth: Mucous membranes are moist.      Pharynx: Oropharynx is clear. No oropharyngeal exudate or posterior oropharyngeal erythema.   Eyes:      General: No scleral icterus.        Right eye: No discharge.         Left eye: No discharge.      Conjunctiva/sclera: Conjunctivae normal.      Pupils: Pupils are equal, round, and reactive to light.   Neck:      Musculoskeletal: Normal range of motion  and neck supple. No neck rigidity or muscular tenderness.      Vascular: No JVD.   Cardiovascular:      Rate and Rhythm: Normal rate and regular rhythm.      Pulses: Normal pulses.      Heart sounds: Normal heart sounds. No murmur.   Pulmonary:      Effort: Pulmonary effort is normal. No respiratory distress.      Breath sounds: Normal breath sounds. No stridor. No wheezing, rhonchi or rales.   Chest:      Chest wall: No tenderness.   Abdominal:      General: Bowel sounds are normal. There is no distension.      Palpations: Abdomen is soft.      Tenderness: There is no abdominal tenderness. There is no guarding.   Genitourinary:     Comments: Barron draining clear yellow urine  Musculoskeletal: Normal range of motion.         General: No swelling or tenderness.   Lymphadenopathy:      Cervical: No cervical adenopathy.   Skin:     General: Skin is warm and dry.      Capillary Refill: Capillary refill takes 2 to 3 seconds.      Coloration: Skin is not jaundiced or pale.      Findings: No erythema or rash.      Comments: Surgical dressing to back clean dry and intact   Neurological:      General: No focal deficit present.      Mental Status: She is alert and oriented to person, place, and time.      Sensory: No sensory deficit.      Motor: No weakness.   Psychiatric:         Mood and Affect: Mood normal.         Behavior: Behavior normal.         Thought Content: Thought content normal.         Significant Labs:   CBC:   Recent Labs   Lab 08/12/20  0509 08/13/20  0459   WBC 11.65 13.83*   HGB 10.1* 10.3*   HCT 32.1* 33.5*    345     CMP:   Recent Labs   Lab 08/12/20  0509 08/13/20  0459    139   K 3.9 3.5    103   CO2 24 25    104   BUN 5* 8   CREATININE 0.6 0.8   CALCIUM 8.7 8.7   ANIONGAP 11 11   EGFRNONAA >60 >60     All pertinent labs within the past 24 hours have been reviewed.    Significant Imaging: I have reviewed all pertinent imaging results/findings within the past 24  hours.      Assessment/Plan:      * Lumbar disc herniation  POD 2 s/p LAMINECTOMY, SPINE L3, L4, L5  with Dr. Spain.  Pt with CSF fluid leak postoperatively.    Bedrest completed and patient to start ambulating with PT OT today  Dressing to back clean dry and intact  Pain control-PCA discontinued oral pain medicine initiated  Continue care per orthopedic orders.      Fever  Patient developed fever last night that resolved with Tylenol  Blood cultures, urinalysis, urine culture and chest x-ray ordered      Ruptured lumbar intervertebral disc  As above      Essential hypertension  Patient had episode of mild hypotension during the night that improved with IV fluids  Will hold all antihypertensives and HCTZ today    Chronic, controlled.  Latest blood pressure and vitals reviewed-   Temp:  [97.3 °F (36.3 °C)-102.3 °F (39.1 °C)]   Pulse:  []   Resp:  [14-20]   BP: ()/(50-58)   SpO2:  [91 %-99 %] .   Home meds for hypertension were reviewed and noted below. Hospital anti-hypertensive changes were made as shown below.  Hypertension Medications             hydroCHLOROthiazide (HYDRODIURIL) 12.5 MG Tab Take 1 tablet (12.5 mg total) by mouth once daily.    lisinopril (PRINIVIL,ZESTRIL) 40 MG tablet Take 1 tablet (40 mg total) by mouth once daily.      Hospital Medications             hydroCHLOROthiazide tablet 12.5 mg 12.5 mg, Oral, Daily    lisinopriL tablet 40 mg 40 mg, Oral, Daily        Will utilize p.r.n. blood pressure medication only if patient's blood pressure greater than  180/110 and she develops symptoms such as worsening chest pain or shortness of breath.        Obesity  Body mass index is 39.54 kg/m².  Obesity compounds patient co-morbidities and complicates treatment course.  Counseling given regarding diet modification and exercise recommendations.          VTE Risk Mitigation (From admission, onward)         Ordered     IP VTE LOW RISK PATIENT  Once      08/11/20 1500     Place sequential  compression device  Until discontinued      08/11/20 0730     Place CARIDAD hose  Until discontinued      08/11/20 0730                Discharge Planning   JOSE GUADALUPE:      Code Status: Prior   Is the patient medically ready for discharge?:     Reason for patient still in hospital (select all that apply): Patient new problem, Treatment and PT / OT recommendations  Discharge Plan A: Home with family, Home Health                  Irene Garcia NP  Department of Hospital Medicine   [facility]

## 2020-08-14 LAB
ANION GAP SERPL CALC-SCNC: 9 MMOL/L (ref 8–16)
BACTERIA UR CULT: NO GROWTH
BASOPHILS # BLD AUTO: 0.04 K/UL (ref 0–0.2)
BASOPHILS NFR BLD: 0.4 % (ref 0–1.9)
BUN SERPL-MCNC: 6 MG/DL (ref 6–20)
CALCIUM SERPL-MCNC: 8.7 MG/DL (ref 8.7–10.5)
CHLORIDE SERPL-SCNC: 103 MMOL/L (ref 95–110)
CO2 SERPL-SCNC: 26 MMOL/L (ref 23–29)
CREAT SERPL-MCNC: 0.6 MG/DL (ref 0.5–1.4)
DIFFERENTIAL METHOD: ABNORMAL
EOSINOPHIL # BLD AUTO: 0.3 K/UL (ref 0–0.5)
EOSINOPHIL NFR BLD: 2.6 % (ref 0–8)
ERYTHROCYTE [DISTWIDTH] IN BLOOD BY AUTOMATED COUNT: 13.7 % (ref 11.5–14.5)
EST. GFR  (AFRICAN AMERICAN): >60 ML/MIN/1.73 M^2
EST. GFR  (NON AFRICAN AMERICAN): >60 ML/MIN/1.73 M^2
FINAL PATHOLOGIC DIAGNOSIS: NORMAL
GLUCOSE SERPL-MCNC: 114 MG/DL (ref 70–110)
GROSS: NORMAL
HCT VFR BLD AUTO: 29.4 % (ref 37–48.5)
HGB BLD-MCNC: 9.2 G/DL (ref 12–16)
IMM GRANULOCYTES # BLD AUTO: 0.06 K/UL (ref 0–0.04)
IMM GRANULOCYTES NFR BLD AUTO: 0.6 % (ref 0–0.5)
LYMPHOCYTES # BLD AUTO: 3.2 K/UL (ref 1–4.8)
LYMPHOCYTES NFR BLD: 29.5 % (ref 18–48)
MCH RBC QN AUTO: 26.4 PG (ref 27–31)
MCHC RBC AUTO-ENTMCNC: 31.3 G/DL (ref 32–36)
MCV RBC AUTO: 85 FL (ref 82–98)
MONOCYTES # BLD AUTO: 0.7 K/UL (ref 0.3–1)
MONOCYTES NFR BLD: 6.7 % (ref 4–15)
NEUTROPHILS # BLD AUTO: 6.6 K/UL (ref 1.8–7.7)
NEUTROPHILS NFR BLD: 60.2 % (ref 38–73)
NRBC BLD-RTO: 0 /100 WBC
PLATELET # BLD AUTO: 241 K/UL (ref 150–350)
PMV BLD AUTO: 9.7 FL (ref 9.2–12.9)
POTASSIUM SERPL-SCNC: 3.5 MMOL/L (ref 3.5–5.1)
RBC # BLD AUTO: 3.48 M/UL (ref 4–5.4)
SODIUM SERPL-SCNC: 138 MMOL/L (ref 136–145)
WBC # BLD AUTO: 10.88 K/UL (ref 3.9–12.7)

## 2020-08-14 PROCEDURE — 25000003 PHARM REV CODE 250: Performed by: PHYSICIAN ASSISTANT

## 2020-08-14 PROCEDURE — 36415 COLL VENOUS BLD VENIPUNCTURE: CPT

## 2020-08-14 PROCEDURE — 97116 GAIT TRAINING THERAPY: CPT

## 2020-08-14 PROCEDURE — 80048 BASIC METABOLIC PNL TOTAL CA: CPT

## 2020-08-14 PROCEDURE — 85025 COMPLETE CBC W/AUTO DIFF WBC: CPT

## 2020-08-14 PROCEDURE — 94760 N-INVAS EAR/PLS OXIMETRY 1: CPT

## 2020-08-14 PROCEDURE — 94799 UNLISTED PULMONARY SVC/PX: CPT

## 2020-08-14 RX ADMIN — OXYCODONE HYDROCHLORIDE 15 MG: 10 TABLET ORAL at 11:08

## 2020-08-14 RX ADMIN — OXYCODONE HYDROCHLORIDE 10 MG: 10 TABLET ORAL at 01:08

## 2020-08-14 RX ADMIN — OXYCODONE HYDROCHLORIDE 10 MG: 10 TABLET ORAL at 05:08

## 2020-08-14 NOTE — PLAN OF CARE
Ok to pull rolling walker and bedside commode from [facility] per Donna with [employer] [phone].  SW delivered equipment to patient hospital room.  Patient signed delivery ticket.       08/14/20 1053   Post-Acute Status   Post-Acute Authorization HME   HME Status Set-up Complete

## 2020-08-14 NOTE — NURSING
Discharge instructions given to pt. Instructed on medicine regimen and f/u appts. Pt verbalizes understanding. IV  removed. Updated pt on change on AVS  to her primary. Pt and mother verbalized understanding. Pt waiting on transport. Sweetie CHAVARRIA aware

## 2020-08-14 NOTE — DISCHARGE INSTRUCTIONS
Take pain medication as prescribed.  Make sure to take a stool softener daily to help prevent constipation while taking narcotics.  Rest and drink adequate fluids.  Okay to shower but no tub baths.  I have ordered home health who will assess your wound and advise on wound care and showering.  Use walker for support to prevent falls.  Follow-up with Dr. Spain as instructed.  Return to ER for any worsening symptoms or concerns.    Thank you for choosing [facility] for your medical care. The primary doctor who is taking care of you at the time of your discharge is Elisabeth Pagan MD.     You were admitted to the hospital with Lumbar disc herniation.     Please note your discharge instructions, including diet/activity restrictions, follow-up appointments, and medication changes.  If you have any questions about your medical issues, prescriptions, or any other questions, please feel free to contact the [facility] Medicine Dept at [phone] and we will help.    If you are previously with Home health, outpatient PT/OT or under a therapy program, you are cleared to return to those programs.    Please direct all long term medication refills and follow up to your primary care provider, Jamila Smith MD. Thank you again for letting us take care of your health care needs.    Please note the following discharge instructions per your discharging physician-  Irene Garcia NP

## 2020-08-14 NOTE — RESPIRATORY THERAPY
08/13/20 2117   Patient Assessment/Suction   Level of Consciousness (AVPU) alert   Respiratory Effort Unlabored;Normal   Expansion/Accessory Muscles/Retractions no use of accessory muscles;no retractions;expansion symmetric   All Lung Fields Breath Sounds Anterior:;diminished   KARLIE Breath Sounds diminished   LLL Breath Sounds clear;diminished   RUL Breath Sounds clear;diminished   RML Breath Sounds diminished   RLL Breath Sounds diminished   Rhythm/Pattern, Respiratory unlabored;depth regular;pattern regular   Cough Frequency infrequent   Cough Type dry;fair   PRE-TX-O2   O2 Device (Oxygen Therapy) room air   SpO2 97 %   Pulse Oximetry Type Intermittent   $ Pulse Oximetry - Multiple Charge Pulse Oximetry - Multiple   Pulse 103   Resp 16   Positioning HOB elevated 30 degrees   Incentive Spirometer   $ Incentive Spirometer Charges done with encouragement   Incentive Spirometer Predicted Level (mL) 2500   Administration (IS) proper technique demonstrated;instruction provided, follow-up   Number of Repetitions (IS) 10   Level Incentive Spirometer (mL) 1500   Patient Tolerance (IS) good;no adverse signs/symptoms present

## 2020-08-14 NOTE — CARE UPDATE
08/14/20 0740   Patient Assessment/Suction   Level of Consciousness (AVPU) alert   Respiratory Effort Unlabored   PRE-TX-O2   O2 Device (Oxygen Therapy) room air   SpO2 99 %   Pulse Oximetry Type Intermittent   $ Pulse Oximetry - Single Charge Pulse Oximetry - Single   Incentive Spirometer   $ Incentive Spirometer Charges done with encouragement   Administration (IS) proper technique demonstrated   Number of Repetitions (IS) 10   Level Incentive Spirometer (mL) 1500   Patient Tolerance (IS) good

## 2020-08-14 NOTE — ASSESSMENT & PLAN NOTE
Cont OOB with PT and nursing  Plan for DC home today - may need HH depending on functional mobility

## 2020-08-14 NOTE — SUBJECTIVE & OBJECTIVE
"Principal Problem:Lumbar disc herniation    Principal Orthopedic Problem: S/P multi-level lum/ cotton    Interval History: none    Review of patient's allergies indicates:  No Known Allergies    Current Facility-Administered Medications   Medication    0.9%  NaCl infusion    acetaminophen tablet 650 mg    aluminum-magnesium hydroxide-simethicone 200-200-20 mg/5 mL suspension 30 mL    bisacodyL suppository 10 mg    diphenhydrAMINE capsule 50 mg    docusate sodium capsule 100 mg    electrolyte-S (ISOLYTE)    FLUoxetine capsule 20 mg    gabapentin capsule 300 mg    hydromorphone (PF) injection 2 mg    methocarbamoL tablet 500 mg    naloxone 0.4 mg/mL injection 0.02 mg    ondansetron disintegrating tablet 8 mg    oxyCODONE immediate release tablet 15 mg    oxyCODONE immediate release tablet 5 mg    oxyCODONE immediate release tablet Tab 10 mg    promethazine (PHENERGAN) 12.5 mg in dextrose 5 % 50 mL IVPB    senna-docusate 8.6-50 mg per tablet 2 tablet     Objective:     Vital Signs (Most Recent):  Temp: 97 °F (36.1 °C) (08/14/20 0405)  Pulse: 102 (08/14/20 0405)  Resp: 16 (08/14/20 0557)  BP: (!) 117/57 (08/14/20 0405)  SpO2: 95 % (08/14/20 0405) Vital Signs (24h Range):  Temp:  [97 °F (36.1 °C)-99.8 °F (37.7 °C)] 97 °F (36.1 °C)  Pulse:  [101-110] 102  Resp:  [16-18] 16  SpO2:  [92 %-98 %] 95 %  BP: (114-118)/(55-62) 117/57     Weight: 110.2 kg (243 lb)  Height: 5' 6" (167.6 cm)  Body mass index is 39.22 kg/m².      Intake/Output Summary (Last 24 hours) at 8/14/2020 0755  Last data filed at 8/13/2020 1753  Gross per 24 hour   Intake 2125.42 ml   Output 550 ml   Net 1575.42 ml       General    Nursing note and vitals reviewed.  Constitutional:   Obese   Pulmonary/Chest: Effort normal.   Psychiatric:   Flat affect         Back (L-Spine & T-Spine) / Neck (C-Spine) Exam     Comments:  Incision/ dressing C/D/I. BLEs DNVI        Significant Labs:   CBC:   Recent Labs   Lab 08/13/20  0459 08/14/20  0458   WBC " 13.83* 10.88   HGB 10.3* 9.2*   HCT 33.5* 29.4*    241     CMP:   Recent Labs   Lab 08/13/20  0459 08/14/20  0458    138   K 3.5 3.5    103   CO2 25 26    114*   BUN 8 6   CREATININE 0.8 0.6   CALCIUM 8.7 8.7   ANIONGAP 11 9   EGFRNONAA >60 >60     All pertinent labs within the past 24 hours have been reviewed.    Significant Imaging: None

## 2020-08-14 NOTE — PT/OT/SLP PROGRESS
"Physical Therapy Treatment    Patient Name:  Aysha Waters   MRN:  [id number]    Recommendations:     Discharge Recommendations:  home, outpatient PT   Discharge Equipment Recommendations: walker, rolling   Barriers to discharge: None    Assessment:     Aysha Waters is a 19 y.o. female admitted with a medical diagnosis of Lumbar disc herniation.  She presents with the following impairments/functional limitations:     .    Rehab Prognosis: Good; patient would benefit from acute skilled PT services to address these deficits and reach maximum level of function.    Recent Surgery: Procedure(s) (LRB):  LAMINECTOMY, SPINE L3, L4, L5 (N/A) 3 Days Post-Op    Plan:     During this hospitalization, patient to be seen BID to address the identified rehab impairments via gait training, therapeutic activities and progress toward the following goals:    · Plan of Care Expires:  08/20/20    Subjective     Chief Complaint: none stated  Patient/Family Comments/goals: agrees to work with PT  Pain/Comfort: "a little sore"    Objective:     Communicated with nurse (Joanne) prior to session.  Patient found up in chair with   upon PT entry to room.     General Precautions: Standard, fall   Orthopedic Precautions:spinal precautions   Braces: N/A     Functional Mobility:  · Transfers:     · Sit to Stand:  contact guard assistance with rolling walker  · Gait: 225' with RW with CG/SBA and cues      AM-PAC 6 CLICK MOBILITY  Turning over in bed (including adjusting bedclothes, sheets and blankets)?: 3  Sitting down on and standing up from a chair with arms (e.g., wheelchair, bedside commode, etc.): 4  Moving from lying on back to sitting on the side of the bed?: 3  Moving to and from a bed to a chair (including a wheelchair)?: 4  Need to walk in hospital room?: 4  Climbing 3-5 steps with a railing?: 3  Basic Mobility Total Score: 21       Therapeutic Activities and Exercises:  Described safe stair technique with assistance (pt states " that she has 1 step to enter house)    Patient left up in chair with chair alarm on and nurses and mother present..    GOALS:   Multidisciplinary Problems     Physical Therapy Goals        Problem: Physical Therapy Goal    Goal Priority Disciplines Outcome Goal Variances Interventions   Physical Therapy Goal     PT, PT/OT Ongoing, Progressing     Description: Goals to be met by: 20     Patient will increase functional independence with mobility by performin. Supine to sit with Stand-by Assistance  2. Sit to stand transfer with Stand-by Assistance  3. Bed to chair transfer with Contact Guard Assistance using Rolling Walker  4. Gait  x 100 feet with Contact Guard Assistance using Rolling Walker.                      Time Tracking:     PT Received On: 20  PT Start Time: 825     PT Stop Time: 835  PT Total Time (min): 10 min     Billable Minutes: Gait Training 10    Treatment Type: Treatment  PT/PTA: PT     PTA Visit Number: 0     Pa White, PT  2020

## 2020-08-14 NOTE — PLAN OF CARE
Christos sent referral to [facility] in .  Pending acceptance.  3:56pm  Christos spoke with Irene at [facility] [place] . She will check with her manager and leave a message in [employer]. Christos will follow-up.     08/14/20 9682   Post-Acute Status   Post-Acute Authorization [facility]   [facility] Status Referrals Sent

## 2020-08-14 NOTE — NURSING
Patient alert and oriented, flat affect noted. Reports pain managed with PRN oxycodone. OOB to bedside commode with standby assist. Continues IV NSS @ 125ml/hr to PIV in right hand. PRN senna given with bedtime meds, reports small loose BM tonight. Skin intact and dry, neuro checks stable and WNL. Dressing to incision CDI. SCDs and CARIDAD hose in place.

## 2020-08-14 NOTE — PLAN OF CARE
Problem: Adult Inpatient Plan of Care  Goal: Plan of Care Review  Outcome: Ongoing, Progressing  Goal: Patient-Specific Goal (Individualization)  Outcome: Ongoing, Progressing  Goal: Absence of Hospital-Acquired Illness or Injury  Outcome: Ongoing, Progressing  Goal: Optimal Comfort and Wellbeing  Outcome: Ongoing, Progressing  Goal: Readiness for Transition of Care  Outcome: Ongoing, Progressing  Goal: Rounds/Family Conference  Outcome: Ongoing, Progressing     Problem: Fall Injury Risk  Goal: Absence of Fall and Fall-Related Injury  Outcome: Ongoing, Progressing  Intervention: Identify and Manage Contributors to Fall Injury Risk  Flowsheets (Taken 8/13/2020 1948)  Self-Care Promotion:   independence encouraged   BADL personal objects within reach  Medication Review/Management: medications reviewed     Problem: Infection  Goal: Infection Symptom Resolution  Outcome: Ongoing, Progressing  Intervention: Prevent or Manage Infection  Flowsheets (Taken 8/13/2020 1948)  Fever Reduction/Comfort Measures: lightweight bedding  Infection Management: aseptic technique maintained     Problem: Skin Injury Risk Increased  Goal: Skin Health and Integrity  Outcome: Ongoing, Progressing  Intervention: Promote and Optimize Oral Intake  Flowsheets (Taken 8/13/2020 1948)  Oral Nutrition Promotion: calorie dense foods provided

## 2020-08-14 NOTE — PROGRESS NOTES
"[facility]  Orthopedics  Progress Note    Patient Name: Aysha Waters  MRN: [id number]  Admission Date: 8/11/2020  Hospital Length of Stay: 2 days  Attending Provider: Elisabeth Pagan MD  Primary Care Provider: Jamila Smith MD  Follow-up For: Procedure(s) (LRB):  LAMINECTOMY, SPINE L3, L4, L5 (N/A)    Post-Operative Day: 3 Days Post-Op  Subjective:     Principal Problem:Lumbar disc herniation    Principal Orthopedic Problem: S/P multi-level lum/ cotton    Interval History: none    Review of patient's allergies indicates:  No Known Allergies    Current Facility-Administered Medications   Medication    0.9%  NaCl infusion    acetaminophen tablet 650 mg    aluminum-magnesium hydroxide-simethicone 200-200-20 mg/5 mL suspension 30 mL    bisacodyL suppository 10 mg    diphenhydrAMINE capsule 50 mg    docusate sodium capsule 100 mg    electrolyte-S (ISOLYTE)    FLUoxetine capsule 20 mg    gabapentin capsule 300 mg    hydromorphone (PF) injection 2 mg    methocarbamoL tablet 500 mg    naloxone 0.4 mg/mL injection 0.02 mg    ondansetron disintegrating tablet 8 mg    oxyCODONE immediate release tablet 15 mg    oxyCODONE immediate release tablet 5 mg    oxyCODONE immediate release tablet Tab 10 mg    promethazine (PHENERGAN) 12.5 mg in dextrose 5 % 50 mL IVPB    senna-docusate 8.6-50 mg per tablet 2 tablet     Objective:     Vital Signs (Most Recent):  Temp: 97 °F (36.1 °C) (08/14/20 0405)  Pulse: 102 (08/14/20 0405)  Resp: 16 (08/14/20 0557)  BP: (!) 117/57 (08/14/20 0405)  SpO2: 95 % (08/14/20 0405) Vital Signs (24h Range):  Temp:  [97 °F (36.1 °C)-99.8 °F (37.7 °C)] 97 °F (36.1 °C)  Pulse:  [101-110] 102  Resp:  [16-18] 16  SpO2:  [92 %-98 %] 95 %  BP: (114-118)/(55-62) 117/57     Weight: 110.2 kg (243 lb)  Height: 5' 6" (167.6 cm)  Body mass index is 39.22 kg/m².      Intake/Output Summary (Last 24 hours) at 8/14/2020 3768  Last data filed at 8/13/2020 7800  Gross per 24 hour   Intake " 2125.42 ml   Output 550 ml   Net 1575.42 ml       General    Nursing note and vitals reviewed.  Constitutional:   Obese   Pulmonary/Chest: Effort normal.   Psychiatric:   Flat affect         Back (L-Spine & T-Spine) / Neck (C-Spine) Exam     Comments:  Incision/ dressing C/D/I. BLEs DNVI        Significant Labs:   CBC:   Recent Labs   Lab 08/13/20 0459 08/14/20  0458   WBC 13.83* 10.88   HGB 10.3* 9.2*   HCT 33.5* 29.4*    241     CMP:   Recent Labs   Lab 08/13/20 0459 08/14/20 0458    138   K 3.5 3.5    103   CO2 25 26    114*   BUN 8 6   CREATININE 0.8 0.6   CALCIUM 8.7 8.7   ANIONGAP 11 9   EGFRNONAA >60 >60     All pertinent labs within the past 24 hours have been reviewed.    Significant Imaging: None    Assessment/Plan:     * Lumbar disc herniation    Cont OOB with PT and nursing  Plan for DC home today - may need HH depending on functional mobility          OPAL BUNCH  Orthopedics  [facility]

## 2020-08-14 NOTE — PLAN OF CARE
SW sent patient information to [facility] via [facility] system for authorization and acceptance.       08/14/20 1032   Post-Acute Status   Post-Acute Authorization Home Health   HME Status Referrals Sent   Patient choice form signed by patient/caregiver List with quality metrics by geographic area provided

## 2020-08-14 NOTE — MEDICAL/APP STUDENT
[facility]  Discharge Summary      Patient Name: Aysha Waters  MRN: [id number]  Admission Date: 8/11/2020  Hospital Length of Stay: 2 days  Discharge Date and Time:  08/14/2020 11:14 AM  Attending Physician: Elisabeth Pagan MD   Discharging Provider: Maryan Lopez  Primary Care Provider: Jamila Smith MD    HPI: Aysha Waters is a 19-year-old female with PMHx significant for obesity, HTN, PCOS, and back pain.  Pt is S/P LAMINECTOMY, SPINE L3, L4, L5 with Dr. Spain for treatment of disc herniation with intraoperative CSF leak.   Pt was evaluated by Dr. Spain preoperatively for right-sided lumbar lumbosacral pain with intermittent right lower extremity radiculitis dysesthesia to the foot.  Her symptoms started about 9 months ago.  Postoperatively, she complains of 5/10 back pain.  She denies any numbness or tingling.  She denies any chest pain, SOB, nausea, vomiting, or other complaints.  Pt is on bed rest for 24 hr post for seizure early due to CSF fluid leak.  She was placed in outpatient recovery under the service of hospital Medicine postoperatively.  Other pertinent medical Hx as below:    Procedure(s) (LRB):  LAMINECTOMY, SPINE L3, L4, L5 (N/A)      Indwelling Lines/Drains at time of discharge:   Lines/Drains/Airways     None               Hospital Course: No notes on file Aysha Waters is a 20 yo white female who underwent 3 laminectomy Spine L3, L4, and L5 on 8/11 s/p lumbar disk herniation. Pt experienced  intraoperative CSF leak s/p surgery, was evaluated by Dr. Spain and remained on bed rest for 24 hours. She then had slow transitions from laying down flat to sitting upright with no complications. On 8/13 pt had a fever of 102.3F and blood cultures and urine cultures were ordered, which both came back negative. Pt under PT/OT. She currently denies any CP, SOB, n/v, numbness, or constipation. She states she is feeling well and has not complaints. Pt will be discharged with home health  and will require a walker.     PE: Patient is an obese female sitting upright in chair on examination. WD. WN. NAD. RRR. Normal heart sounds. Breath sounds clear to auscultation.   Sensation intact BLE.     Consults:   Consults (From admission, onward)        Status Ordering Provider     Anesthesia consult for PCA management  Once     Provider:  (Not yet assigned)    Acknowledged BHUPINDER HERRERA     Inpatient consult to Social Work  Once     Provider:  (Not yet assigned)    Acknowledged BHUPINDER HERRERA          Significant Diagnostic Studies: Labs:   BMP:   Recent Labs   Lab 08/13/20 0459 08/14/20 0458    114*    138   K 3.5 3.5    103   CO2 25 26   BUN 8 6   CREATININE 0.8 0.6   CALCIUM 8.7 8.7   , CMP   Recent Labs   Lab 08/13/20 0459 08/14/20 0458    138   K 3.5 3.5    103   CO2 25 26    114*   BUN 8 6   CREATININE 0.8 0.6   CALCIUM 8.7 8.7   ANIONGAP 11 9   ESTGFRAFRICA >60 >60   EGFRNONAA >60 >60   , CBC   Recent Labs   Lab 08/13/20 0459 08/14/20 0458   WBC 13.83* 10.88   HGB 10.3* 9.2*   HCT 33.5* 29.4*    241    and All labs within the past 24 hours have been reviewed  Microbiology:   Blood Culture   Lab Results   Component Value Date    LABBLOO No Growth to date 08/13/2020    LABBLOO No Growth to date 08/13/2020    and Urine Culture    Lab Results   Component Value Date    LABURIN Final report (A) 06/29/2020     Radiology: X-Ray: CXR: X-Ray Chest 1 View (CXR):   Results for orders placed or performed during the hospital encounter of 08/11/20   X-Ray Chest 1 View    Narrative    EXAMINATION:  XR CHEST 1 VIEW    CLINICAL HISTORY:  shortness of breath;    TECHNIQUE:  Single frontal view of the chest was performed.    COMPARISON:  08/05/2020    FINDINGS:  The lungs are clear, with normal appearance of pulmonary vasculature and no pleural effusion or pneumothorax.    The cardiac silhouette is normal in size. The hilar and mediastinal contours are  "unremarkable.    Bones are intact.      Impression    No acute abnormality.      Electronically signed by: Annalise Verde MD  Date:    08/13/2020  Time:    09:27       Pending Diagnostic Studies:     Procedure Component Value Units Date/Time    Specimen to Pathology, Surgery Other (SPINE) [[id number]] Collected: 08/11/20 1218    Order Status: Sent Lab Status: In process Updated: 08/11/20 1329        Final Active Diagnoses:    Diagnosis Date Noted POA    PRINCIPAL PROBLEM:  Lumbar disc herniation [M51.26] 08/11/2020 Yes    Fever [R50.9] 08/13/2020 No    Ruptured lumbar intervertebral disc [M51.26] 08/11/2020 Yes    Essential hypertension [I10] 09/18/2019 Yes    Obesity [E66.9] 06/12/2015 Yes      Problems Resolved During this Admission:      Discharged Condition: good    Disposition: Home-Health Care c    Follow Up:  Follow-up Information     OPAL BUNCH On 8/28/2020.    Specialty: Orthopedic Surgery  Why: post op f/u apt in avs  Contact information:  [address] [address]  [phone]             Phylicia Sotomayor MD In 1 week.    Specialty: Family Medicine  Why: to recheck your symptoms. Hosp f/u apt in avs  Contact information:  [address] [facility] [id number] [phone]                 Patient Instructions:      WALKER FOR HOME USE     Order Specific Question Answer Comments   Type of Walker: Adult (5'4"-6'6")    With wheels? Yes    Height: 5' 6" (1.676 m)    Weight: 111.1 kg (245 lb)    Length of need (1-99 months): 99    Does patient have medical equipment at home? none    Please check all that apply: Patient's condition impairs ambulation.    Please check all that apply: Patient is unable to safely ambulate without equipment.    Vendor: [facility]    Expected Date of Delivery: 8/13/2020      3 IN 1 COMMODE FOR HOME USE     Order Specific Question Answer Comments   Type: Standard    Height: 5' 6" (1.676 m)    Weight: 111.1 kg (245 lb)    Does patient " have medical equipment at home? none    Length of need (1-99 months): 99    Vendor: [facility]    Expected Date of Delivery: 8/13/2020      Ambulatory referral/consult to Home Health   Standing Status: Future   Referral Priority: Routine Referral Type: Home Health   Referral Reason: Specialty Services Required   Requested Specialty: Home Health Services   Number of Visits Requested: 1     Diet Cardiac     Notify your health care provider if you experience any of the following:  temperature >100.4     Notify your health care provider if you experience any of the following:  persistent nausea and vomiting or diarrhea     Notify your health care provider if you experience any of the following:  redness, tenderness, or signs of infection (pain, swelling, redness, odor or green/yellow discharge around incision site)     Notify your health care provider if you experience any of the following:  severe uncontrolled pain     Notify your health care provider if you experience any of the following:  difficulty breathing or increased cough     Notify your health care provider if you experience any of the following:  persistent dizziness, light-headedness, or visual disturbances     Notify your health care provider if you experience any of the following:  increased confusion or weakness     Activity as tolerated     Medications:  Reconciled Home Medications:      Medication List      START taking these medications    oxyCODONE-acetaminophen  mg per tablet  Commonly known as: PERCOCET  Take 1 tablet by mouth every 4 (four) hours as needed for Pain.        CONTINUE taking these medications    ethynodiol-ethinyl estradiol 1-50 mg-mcg per tablet  Commonly known as: ZOVIA  Take 1 tablet by mouth once daily.     FLUoxetine 20 MG capsule  Take 20 mg by mouth once daily.     gabapentin 300 MG capsule  Commonly known as: NEURONTIN  Take 1 capsule (300 mg total) by mouth every evening.     hydroCHLOROthiazide 12.5 MG Tab  Commonly  known as: HYDRODIURIL  Take 1 tablet (12.5 mg total) by mouth once daily.     lisinopriL 40 MG tablet  Commonly known as: PRINIVIL,ZESTRIL  Take 1 tablet (40 mg total) by mouth once daily.     metFORMIN 500 MG tablet  Commonly known as: GLUCOPHAGE  Take 1,000 mg by mouth 2 (two) times daily with meals.     methocarbamoL 750 MG Tab  Commonly known as: ROBAXIN  Take 1-2 tablets by mouth every 8 hours as needed for muscle spasms     tretinoin 0.05 % cream  Commonly known as: RETIN-A  Apply topically every evening.          Time spent on the discharge of patient: 35 minutes         Maryana Ortego [facility]

## 2020-08-14 NOTE — PLAN OF CARE
Cm received a call from [facility] , informing me that they are not in network with pt's insurance.  HH was Declined.    HHA: [facility]     Date: 8/14/2020 10:31 AM  Created By: Chiquita Cortes  Status: Declined       08/14/20 1551   Discharge Reassessment   Assessment Type Discharge Planning Reassessment   Provided patient/caregiver education on the expected discharge date and the discharge plan Yes   Do you have any problems affording any of your prescribed medications? No

## 2020-08-15 PROCEDURE — G0180 PR HOME HEALTH MD CERTIFICATION: ICD-10-PCS | Mod: ,,, | Performed by: FAMILY MEDICINE

## 2020-08-15 PROCEDURE — G0180 MD CERTIFICATION HHA PATIENT: HCPCS | Mod: ,,, | Performed by: FAMILY MEDICINE

## 2020-08-15 NOTE — HOSPITAL COURSE
Patient underwent laminectomy of L3, L4, and L5 on 8/11 by Dr. Spain. Pt experienced CSF leak s/p surgery and was evaluated by Dr. Spain and remained on bed rest for 24 hours.  Patient experienced expected postoperative lower back pain that was controlled on PCA pump which was eventually transitioned to oral pain medicine.  On 8/13 pt developed a fever of 102.3F.  Blood cultures, urine culture and chest x-ray were obtained and were all negative.  Patient remained fever free.  Patient experienced an episode of hypotension on 08/13 that resolved with IV fluid bolus.  Patient's vital signs remained stable and she did not require further intervention.  Pt was evaluated by PT/OT and progressed well.  PT recommended walker for home use and this was arranged along with home health.  Patient's pain was well controlled with oral pain medication and this was provided by Dr. Spain.

## 2020-08-15 NOTE — PLAN OF CARE
Pt clear for discharge from case management standpoint. Pt discharging home with home Health through [facility]. SW remains available.         08/15/20 0821   Final Note   Assessment Type Final Discharge Note   Anticipated Discharge Disposition Home-Health   Right Care Referral Info   Post Acute Recommendation Home-care   Referral Type Home Care   Facility Name [facility]   [address] [place] [address]   Post-Acute Status   Home Health Status Set-up Complete

## 2020-08-15 NOTE — DISCHARGE SUMMARY
[facility] Hospital Medicine  Discharge Summary      Patient Name: Aysha Waters  MRN: [id number]  Admission Date: 8/11/2020  Hospital Length of Stay: 2 days  Discharge Date and Time: 8/14/2020 11:50 AM  Attending Physician: Patricia att. providers found   Discharging Provider: Irene Garcia NP  Primary Care Provider: Jamila Smith MD      HPI:   Aysha Waters is a 19-year-old female with PMHx significant for obesity, HTN, PCOS, and back pain.  Pt is S/P LAMINECTOMY, SPINE L3, L4, L5 with Dr. Spain for treatment of disc herniation with intraoperative CSF leak.   Pt was evaluated by Dr. Spain preoperatively for right-sided lumbar lumbosacral pain with intermittent right lower extremity radiculitis dysesthesia to the foot.  Her symptoms started about 9 months ago.  Postoperatively, she complains of 5/10 back pain.  She denies any numbness or tingling.  She denies any chest pain, SOB, nausea, vomiting, or other complaints.  Pt is on bed rest for 24 hr post for seizure early due to CSF fluid leak.  She was placed in outpatient recovery under the service of hospital Medicine postoperatively.  Other pertinent medical Hx as below:    Procedure(s) (LRB):  LAMINECTOMY, SPINE L3, L4, L5 (N/A)      Hospital Course:   Patient underwent laminectomy of L3, L4, and L5 on 8/11 by Dr. Spain. Pt experienced CSF leak s/p surgery and was evaluated by Dr. Spain and remained on bed rest for 24 hours.  Patient experienced expected postoperative lower back pain that was controlled on PCA pump which was eventually transitioned to oral pain medicine.  On 8/13 pt developed a fever of 102.3F.  Blood cultures, urine culture and chest x-ray were obtained and were all negative.  Patient remained fever free.  Patient experienced an episode of hypotension on 08/13 that resolved with IV fluid bolus.  Patient's vital signs remained stable and she did not require further intervention.  Pt was evaluated by PT/OT and  "progressed well.  PT recommended walker for home use and this was arranged along with home health.  Patient's pain was well controlled with oral pain medication and this was provided by Dr. Spain.     Consults:     No new Assessment & Plan notes have been filed under this hospital service since the last note was generated.  Service: Hospital Medicine    Final Active Diagnoses:    Diagnosis Date Noted POA    PRINCIPAL PROBLEM:  Lumbar disc herniation [M51.26] 08/11/2020 Yes    Fever [R50.9] 08/13/2020 No    Ruptured lumbar intervertebral disc [M51.26] 08/11/2020 Yes    Essential hypertension [I10] 09/18/2019 Yes    Obesity [E66.9] 06/12/2015 Yes      Problems Resolved During this Admission:       Discharged Condition: good    Disposition: Home or Self Care    Follow Up:  Follow-up Information     OPAL BUNCH On 8/28/2020.    Specialty: Orthopedic Surgery  Contact information:  [address] [address]  [phone]             Jamila Smith MD In 1 week.    Specialty: Family Medicine  Why: to recheck your symptoms  Contact information:  [address] [facility] [address]  [phone]                 Patient Instructions:      WALKER FOR HOME USE     Order Specific Question Answer Comments   Type of Walker: Adult (5'4"-6'6")    With wheels? Yes    Height: 5' 6" (1.676 m)    Weight: 111.1 kg (245 lb)    Length of need (1-99 months): 99    Does patient have medical equipment at home? none    Please check all that apply: Patient's condition impairs ambulation.    Please check all that apply: Patient is unable to safely ambulate without equipment.    Vendor: [facility]    Expected Date of Delivery: 8/13/2020      3 IN 1 COMMODE FOR HOME USE     Order Specific Question Answer Comments   Type: Standard    Height: 5' 6" (1.676 m)    Weight: 111.1 kg (245 lb)    Does patient have medical equipment at home? none    Length of need (1-99 months): 99    Vendor: [facility]  "   Expected Date of Delivery: 8/13/2020      Ambulatory referral/consult to Home Health   Standing Status: Future   Referral Priority: Routine Referral Type: Home Health   Referral Reason: Specialty Services Required   Requested Specialty: Home Health Services   Number of Visits Requested: 1     Diet Cardiac     Notify your health care provider if you experience any of the following:  temperature >100.4     Notify your health care provider if you experience any of the following:  persistent nausea and vomiting or diarrhea     Notify your health care provider if you experience any of the following:  redness, tenderness, or signs of infection (pain, swelling, redness, odor or green/yellow discharge around incision site)     Notify your health care provider if you experience any of the following:  severe uncontrolled pain     Notify your health care provider if you experience any of the following:  difficulty breathing or increased cough     Notify your health care provider if you experience any of the following:  persistent dizziness, light-headedness, or visual disturbances     Notify your health care provider if you experience any of the following:  increased confusion or weakness     Activity as tolerated       Significant Diagnostic Studies: Labs:   CMP   Recent Labs   Lab 08/14/20  0458      K 3.5      CO2 26   *   BUN 6   CREATININE 0.6   CALCIUM 8.7   ANIONGAP 9   ESTGFRAFRICA >60   EGFRNONAA >60   , CBC   Recent Labs   Lab 08/14/20  0458   WBC 10.88   HGB 9.2*   HCT 29.4*       and All labs within the past 24 hours have been reviewed    Pending Diagnostic Studies:     None         Medications:  Reconciled Home Medications:      Medication List      START taking these medications    oxyCODONE-acetaminophen  mg per tablet  Commonly known as: PERCOCET  Take 1 tablet by mouth every 4 (four) hours as needed for Pain.        CONTINUE taking these medications    ethynodiol-ethinyl  estradiol 1-50 mg-mcg per tablet  Commonly known as: ZOVIA  Take 1 tablet by mouth once daily.     FLUoxetine 20 MG capsule  Take 20 mg by mouth once daily.     gabapentin 300 MG capsule  Commonly known as: NEURONTIN  Take 1 capsule (300 mg total) by mouth every evening.     hydroCHLOROthiazide 12.5 MG Tab  Commonly known as: HYDRODIURIL  Take 1 tablet (12.5 mg total) by mouth once daily.     lisinopriL 40 MG tablet  Commonly known as: PRINIVIL,ZESTRIL  Take 1 tablet (40 mg total) by mouth once daily.     metFORMIN 500 MG tablet  Commonly known as: GLUCOPHAGE  Take 1,000 mg by mouth 2 (two) times daily with meals.     methocarbamoL 750 MG Tab  Commonly known as: ROBAXIN  Take 1-2 tablets by mouth every 8 hours as needed for muscle spasms     tretinoin 0.05 % cream  Commonly known as: RETIN-A  Apply topically every evening.            Indwelling Lines/Drains at time of discharge:   Lines/Drains/Airways     None                 Time spent on the discharge of patient: 34 minutes  Patient was seen and examined on the date of discharge and determined to be suitable for discharge.  Physical exam-awake alert orient x4, sitting up in bedside chair, no distress  Heart-regular rate rhythm, no edema  Lungs-Clear to auscultation bilaterally, nonlabored  Abdomen-obese, soft nontender  Extremities-moving all equally, sensation intact  Skin-surgical incisions to lower back clean dry and intact.       Irene Garcia NP  Department of Hospital Medicine  [facility]

## 2020-08-16 ENCOUNTER — NURSE TRIAGE (OUTPATIENT)
Dept: ADMINISTRATIVE | Facility: CLINIC | Age: 20
End: 2020-08-16

## 2020-08-16 NOTE — TELEPHONE ENCOUNTER
Had sx on Tuesday, draining through gauze, clear with red tint, changed dressing last night, started draining about 30 mins ago, saturated shirt.

## 2020-08-16 NOTE — TELEPHONE ENCOUNTER
Reason for Disposition   Getting the incision wet, questions about   Surgical incision symptoms and questions   Dressing soaked with blood or body fluid (e.g., drainage)    Additional Information   Negative: Sounds like a life-threatening emergency to the triager   Negative: Chest pain   Negative: Difficulty breathing   Negative: Acting confused (e.g., disoriented, slurred speech) or excessively sleepy   Negative: Cast problems or questions   Negative: Medication question   Negative: [1] Widespread rash AND [2] bright red, sunburn-like   Negative: [1] SEVERE headache AND [2] after spinal (epidural) anesthesia   Negative: [1] Vomiting AND [2] persists > 4 hours   Negative: [1] Vomiting AND [2] abdomen looks much more swollen than usual   Negative: [1] Drinking very little AND [2] dehydration suspected (e.g., no urine > 12 hours, very dry mouth, very lightheaded)   Negative: Patient sounds very sick or weak to the triager   Negative: Sounds like a serious complication to the triager   Negative: Fever > 100.4 F (38.0 C)   Negative: [1] SEVERE post-op pain (e.g., excruciating, pain scale 8-10) AND [2] not controlled with pain medications   Negative: [1] Caller has URGENT question AND [2] triager unable to answer question   Negative: [1] Headache AND [2] after spinal (epidural) anesthesia AND [3] not severe   Negative: Fever present > 3 days (72 hours)   Negative: [1] MILD-MODERATE post-op pain (e.g., pain scale 1-7) AND [2] not controlled with pain medications   Negative: [1] Caller has NON-URGENT question AND [2] triager unable to answer question   Negative: [1] Major abdominal surgical incision AND [2] wound gaping open AND [3] visible internal organs   Negative: Sounds like a life-threatening emergency to the triager   Negative: [1] Bleeding from incision AND [2] won't stop after 10 minutes of direct pressure   Negative: [1] Widespread rash AND [2] bright red, sunburn-like   Negative:  Severe pain in the incision   Negative: [1] Incision gaping open AND [2] < 48 hours since wound re-opened   Negative: [1] Incision gaping open AND [2] length of opening > 2 inches (5 cm)   Negative: Patient sounds very sick or weak to the triager   Negative: Sounds like a serious complication to the triager   Negative: Fever > 100.4 F (38.0 C)   Negative: [1] Incision looks infected (spreading redness, pain) AND [2] fever > 99.5 F (37.5 C)   Negative: [1] Incision looks infected (spreading redness, pain) AND [2] large red area (> 2 in. or 5 cm)   Negative: [1] Incision looks infected (spreading redness, pain) AND [2] face wound   Negative: [1] Red streak runs from the incision AND [2] longer than 1 inch (2.5 cm)   Negative: [1] Pus or bad-smelling fluid draining from incision AND [2] no fever    Protocols used: POST-OP SYMPTOMS AND [id number], POST-OP INCISION SYMPTOMS AND [id number]

## 2020-08-16 NOTE — TELEPHONE ENCOUNTER
Spoke with [facility], they do not take call for Dr. Nirav Spain. Dr. Spain called via his answering service. Report given re large amount of drainage, clear with pink tinge. He will call her. Phone number given,

## 2020-08-17 ENCOUNTER — OFFICE VISIT (OUTPATIENT)
Dept: ORTHOPEDICS | Facility: CLINIC | Age: 20
End: 2020-08-17
Payer: COMMERCIAL

## 2020-08-17 ENCOUNTER — TELEPHONE (OUTPATIENT)
Dept: MEDSURG UNIT | Facility: HOSPITAL | Age: 20
End: 2020-08-17

## 2020-08-17 ENCOUNTER — TELEPHONE (OUTPATIENT)
Dept: ORTHOPEDICS | Facility: CLINIC | Age: 20
End: 2020-08-17

## 2020-08-17 VITALS — WEIGHT: 245 LBS | DIASTOLIC BLOOD PRESSURE: 60 MMHG | SYSTOLIC BLOOD PRESSURE: 118 MMHG | BODY MASS INDEX: 39.54 KG/M2

## 2020-08-17 DIAGNOSIS — Z98.890 STATUS POST LUMBAR LAMINECTOMY: Primary | ICD-10-CM

## 2020-08-17 PROCEDURE — 99024 POSTOP FOLLOW-UP VISIT: CPT | Mod: S$GLB,,, | Performed by: ORTHOPAEDIC SURGERY

## 2020-08-17 PROCEDURE — 99024 PR POST-OP FOLLOW-UP VISIT: ICD-10-PCS | Mod: S$GLB,,, | Performed by: ORTHOPAEDIC SURGERY

## 2020-08-17 RX ORDER — TRAMADOL HYDROCHLORIDE 50 MG/1
TABLET ORAL
Qty: 56 TABLET | Refills: 0 | Status: SHIPPED | OUTPATIENT
Start: 2020-08-17 | End: 2020-10-12

## 2020-08-17 NOTE — TELEPHONE ENCOUNTER
Do you have a cough? no  Have you had a cough since your surgical procedure ?no  Are you short of breath?no  Have you experienced shortness of breath since your surgical procedure?no  Do you have a fever? no  Did you have a fever since your surgical procedure? no  If yes, what was your temperature   Any redness at the surgery site? no Warm to the touch? no  Have you been tested for COVID-19 since your surgical procedure? no What was your result?

## 2020-08-17 NOTE — PROGRESS NOTES
Subjective:    Patient ID: Aysha Waters is a 19 y.o. female.    Chief Complaint: Post-op Evaluation of the Lumbar Spine (PO wound check/care for L3, L4, L5 Laminectomy 8/11/20. She started having a lot of drainage yesterday. No pain down legs, only in lumbar)      History of Present Illness  Patient came in a little early I spoke with him over the weekend she was having some drainage from her wound.  No fevers no chills an absolutely no headaches she comes in for wound check today she is feeling a little nauseated from her pain medicine again denies any significant headaches    Current Medications  Current Outpatient Medications   Medication Sig Dispense Refill    ethynodiol-ethinyl estradiol (ZOVIA) 1-50 mg-mcg per tablet Take 1 tablet by mouth once daily.      FLUoxetine (PROZAC) 20 MG capsule Take 20 mg by mouth once daily.      gabapentin (NEURONTIN) 300 MG capsule Take 1 capsule (300 mg total) by mouth every evening. 30 capsule 1    hydroCHLOROthiazide (HYDRODIURIL) 12.5 MG Tab Take 1 tablet (12.5 mg total) by mouth once daily. 30 tablet 11    lisinopril (PRINIVIL,ZESTRIL) 40 MG tablet Take 1 tablet (40 mg total) by mouth once daily. 90 tablet 3    metFORMIN (GLUCOPHAGE) 500 MG tablet Take 1,000 mg by mouth 2 (two) times daily with meals.       oxyCODONE-acetaminophen (PERCOCET)  mg per tablet Take 1 tablet by mouth every 4 (four) hours as needed for Pain. 40 tablet 0    tretinoin (RETIN-A) 0.05 % cream Apply topically every evening. 45 g 0    methocarbamoL (ROBAXIN) 750 MG Tab Take 1-2 tablets by mouth every 8 hours as needed for muscle spasms (Patient not taking: Reported on 8/17/2020) 30 tablet 0    traMADoL (ULTRAM) 50 mg tablet Take 1-2 po q 6 hours prn pain. 56 tablet 0     No current facility-administered medications for this visit.        Allergies  Review of patient's allergies indicates:  No Known Allergies    Past Medical History  Past Medical History:   Diagnosis Date     Anxiety     Depression     Hypertension     Insulin resistance     Knee derangement     Obesity     PCOS (polycystic ovarian syndrome)     Pinched nerve        Surgical History  Past Surgical History:   Procedure Laterality Date    KNEE ARTHROSCOPY, MEDIAL PATELLO FEMORAL LIGAMENT REPAIR      LAMINECTOMY N/A 8/11/2020    Procedure: LAMINECTOMY, SPINE L3, L4, L5;  Surgeon: Nirav Spain MD;  Location: [facility];  Service: Orthopedics;  Laterality: N/A;    MOUTH SURGERY      petalla surgery Left 07/2019    medically broke the tib/fib and moved the pettella, no replacement  Dr Talley [facility]    TONSILLECTOMY         Family History:   Family History   Problem Relation Age of Onset    Heart disease Father     Hypertension Father     Obesity Sister     Heart disease Paternal Grandfather        Social History:   Social History     Socioeconomic History    Marital status: Single     Spouse name: Not on file    Number of children: Not on file    Years of education: Not on file    Highest education level: Not on file   Occupational History    Not on file   Social Needs    Financial resource strain: Not very hard    Food insecurity     Worry: Never true     Inability: Never true    Transportation needs     Medical: No     Non-medical: No   Tobacco Use    Smoking status: Never Smoker    Smokeless tobacco: Never Used   Substance and Sexual Activity    Alcohol use: No     Frequency: Never     Binge frequency: Never    Drug use: No    Sexual activity: Never     Birth control/protection: OCP   Lifestyle    Physical activity     Days per week: 2 days     Minutes per session: 30 min    Stress: Rather much   Relationships    Social connections     Talks on phone: Three times a week     Gets together: More than three times a week     Attends Yazdanism service: Not on file     Active member of club or organization: No     Attends meetings of clubs or organizations: Never      Relationship status: Never    Other Topics Concern    Not on file   Social History Narrative    Lives with parents and one sibling. In 9th grade, all As       Date of surgery:     Review of Systems     General/Constitutional: Chills denies. Fatigue denies. Fever denies. Weight gain denies. Weight loss denies.    Musculoskeletal: Comments: See HPI for details    Skin: Rash denies.    Objective:   Vital Signs:   Vitals:    08/17/20 0817   BP: 118/60        Physical Exam    This a well-developed, well nourished patient in no acute distress.  They are alert and oriented and cooperative to examination.  Pt. walks without an antalgic gait.      General Examination:     Constitutional: The patient is alert and oriented to lace person and time. Mood is pleasant.     Head/Face: Normal facial features normal eyebrows    Eyes: Normal extraocular motion bilaterally    Lungs: Respirations are equal and unlabored    Gait is coordinated.    Cardiovascular: There are no swelling or varicosities present.    Lymphatic: Negative for adenopathy    Skin: Normal    Neurological: Level of consciousness normal. Oriented to place person and time and situation    Psychiatric: Oriented to time place person and situation    Incision well healed no fluctuance slight ecchymosis.  Minimal drainage on the bandages which would change 24 hr ago.  XRAY Report/ Interpretation:  No headaches.       Assessment:       1. Status post lumbar laminectomy        Plan:       Aysha was seen today for post-op evaluation.    Diagnoses and all orders for this visit:    Status post lumbar laminectomy  -     traMADoL (ULTRAM) 50 mg tablet; Take 1-2 po q 6 hours prn pain.         Follow up in about 2 days (around 8/19/2020) for wound check, s/p lumbar lamincectomy.    I believe the patient has a resolving hematoma.  She had absolutely has no symptoms of any cerebral spinal fluid leakage although there was a small durotomy at the time of surgery from the large  disc herniation new dressings have been applied instructions have been given to contact me for any questions whatsoever.  We will check her again in 2 days.      This note was created using Dragon voice recognition software that occasionally misinterpreted phrases or words.

## 2020-08-18 LAB
BACTERIA BLD CULT: NORMAL
BACTERIA BLD CULT: NORMAL

## 2020-08-19 ENCOUNTER — ANESTHESIA EVENT (OUTPATIENT)
Dept: SURGERY | Facility: HOSPITAL | Age: 20
DRG: 029 | End: 2020-08-19
Payer: COMMERCIAL

## 2020-08-19 ENCOUNTER — OFFICE VISIT (OUTPATIENT)
Dept: ORTHOPEDICS | Facility: CLINIC | Age: 20
End: 2020-08-19
Payer: COMMERCIAL

## 2020-08-19 VITALS
RESPIRATION RATE: 16 BRPM | HEART RATE: 98 BPM | SYSTOLIC BLOOD PRESSURE: 136 MMHG | DIASTOLIC BLOOD PRESSURE: 66 MMHG | BODY MASS INDEX: 39.05 KG/M2 | WEIGHT: 243 LBS | OXYGEN SATURATION: 99 % | HEIGHT: 66 IN | TEMPERATURE: 98 F

## 2020-08-19 VITALS
SYSTOLIC BLOOD PRESSURE: 102 MMHG | WEIGHT: 245 LBS | BODY MASS INDEX: 39.54 KG/M2 | HEART RATE: 70 BPM | TEMPERATURE: 97 F | DIASTOLIC BLOOD PRESSURE: 60 MMHG

## 2020-08-19 DIAGNOSIS — G96.00 CSF LEAK: Primary | ICD-10-CM

## 2020-08-19 DIAGNOSIS — Z98.890 STATUS POST LUMBAR LAMINECTOMY: ICD-10-CM

## 2020-08-19 DIAGNOSIS — Z01.818 PREOP EXAMINATION: ICD-10-CM

## 2020-08-19 PROCEDURE — 99024 PR POST-OP FOLLOW-UP VISIT: ICD-10-PCS | Mod: S$GLB,,, | Performed by: ORTHOPAEDIC SURGERY

## 2020-08-19 PROCEDURE — 99024 POSTOP FOLLOW-UP VISIT: CPT | Mod: S$GLB,,, | Performed by: ORTHOPAEDIC SURGERY

## 2020-08-19 RX ORDER — MUPIROCIN 20 MG/G
OINTMENT TOPICAL
Status: CANCELLED | OUTPATIENT
Start: 2020-08-19

## 2020-08-19 RX ORDER — SODIUM CHLORIDE 9 MG/ML
INJECTION, SOLUTION INTRAVENOUS CONTINUOUS
Status: CANCELLED | OUTPATIENT
Start: 2020-08-19

## 2020-08-19 NOTE — PROGRESS NOTES
Subjective:    Patient ID: Aysha Waters is a 19 y.o. female.    Chief Complaint: Post-op Evaluation of the Lumbar Spine ( wound check s/p lumbar lamincectomy 8-1. She has no lumbar pain but severe headache that started yesterday, dry heaves, freezing then sweating. Lumbar is draining alot)      History of Present Illness  Previous history of multilevel laminectomy.  Patient since Monday has noted some moderately severe headaches and sitting erect and the drainage from her lumbar incision has not ceased.    Current Medications  Current Outpatient Medications   Medication Sig Dispense Refill    ethynodiol-ethinyl estradiol (ZOVIA) 1-50 mg-mcg per tablet Take 1 tablet by mouth once daily.      FLUoxetine (PROZAC) 20 MG capsule Take 20 mg by mouth once daily.      gabapentin (NEURONTIN) 300 MG capsule Take 1 capsule (300 mg total) by mouth every evening. 30 capsule 1    hydroCHLOROthiazide (HYDRODIURIL) 12.5 MG Tab Take 1 tablet (12.5 mg total) by mouth once daily. 30 tablet 11    lisinopril (PRINIVIL,ZESTRIL) 40 MG tablet Take 1 tablet (40 mg total) by mouth once daily. 90 tablet 3    metFORMIN (GLUCOPHAGE) 500 MG tablet Take 1,000 mg by mouth 2 (two) times daily with meals.       methocarbamoL (ROBAXIN) 750 MG Tab Take 1-2 tablets by mouth every 8 hours as needed for muscle spasms 30 tablet 0    traMADoL (ULTRAM) 50 mg tablet Take 1-2 po q 6 hours prn pain. 56 tablet 0    tretinoin (RETIN-A) 0.05 % cream Apply topically every evening. 45 g 0     No current facility-administered medications for this visit.        Allergies  Review of patient's allergies indicates:  No Known Allergies    Past Medical History  Past Medical History:   Diagnosis Date    Anxiety     Depression     Hypertension     Insulin resistance     Knee derangement     Obesity     PCOS (polycystic ovarian syndrome)     Pinched nerve        Surgical History  Past Surgical History:   Procedure Laterality Date    KNEE ARTHROSCOPY,  MEDIAL PATELLO FEMORAL LIGAMENT REPAIR      LAMINECTOMY N/A 8/11/2020    Procedure: LAMINECTOMY, SPINE L3, L4, L5;  Surgeon: Nirav Spain MD;  Location: [facility];  Service: Orthopedics;  Laterality: N/A;    MOUTH SURGERY      petalla surgery Left 07/2019    medically broke the tib/fib and moved the pettella, no replacement  Dr Mayank Hugo [facility]    TONSILLECTOMY         Family History:   Family History   Problem Relation Age of Onset    Heart disease Father     Hypertension Father     Obesity Sister     Heart disease Paternal Grandfather        Social History:   Social History     Socioeconomic History    Marital status: Single     Spouse name: Not on file    Number of children: Not on file    Years of education: Not on file    Highest education level: Not on file   Occupational History    Not on file   Social Needs    Financial resource strain: Not very hard    Food insecurity     Worry: Never true     Inability: Never true    Transportation needs     Medical: No     Non-medical: No   Tobacco Use    Smoking status: Never Smoker    Smokeless tobacco: Never Used   Substance and Sexual Activity    Alcohol use: No     Frequency: Never     Binge frequency: Never    Drug use: No    Sexual activity: Never     Birth control/protection: OCP   Lifestyle    Physical activity     Days per week: 2 days     Minutes per session: 30 min    Stress: Rather much   Relationships    Social connections     Talks on phone: Three times a week     Gets together: More than three times a week     Attends Rastafarian service: Not on file     Active member of club or organization: No     Attends meetings of clubs or organizations: Never     Relationship status: Never    Other Topics Concern    Not on file   Social History Narrative    Lives with parents and one sibling. In 9th grade, all As       Date of surgery:     Review of Systems     General/Constitutional: Chills denies. Fatigue denies.  Fever denies. Weight gain denies. Weight loss denies.    Musculoskeletal: Comments: See HPI for details    Skin: Rash denies.    Objective:   Vital Signs:   Vitals:    08/19/20 1010   BP: 102/60   Pulse: 70   Temp: 96.7 °F (35.9 °C)        Physical Exam    This a well-developed, well nourished patient in no acute distress.  They are alert and oriented and cooperative to examination.  Pt. walks without an antalgic gait.      General Examination:     Constitutional: The patient is alert and oriented to lace person and time. Mood is pleasant.     Head/Face: Normal facial features normal eyebrows    Eyes: Normal extraocular motion bilaterally    Lungs: Respirations are equal and unlabored    Gait is coordinated.    Cardiovascular: There are no swelling or varicosities present.    Lymphatic: Negative for adenopathy    Skin: Normal    Neurological: Level of consciousness normal. Oriented to place person and time and situation    Psychiatric: Oriented to time place person and situation    CC staples are in place some serous drainage noted from the incision patient does have headaches when sitting erect neurovascular status normal  XRAY Report/ Interpretation:        Assessment:       1. CSF leak    2. Status post lumbar laminectomy        Plan:       Aysha was seen today for post-op evaluation.    Diagnoses and all orders for this visit:    CSF leak    Status post lumbar laminectomy         No follow-ups on file.    Treatment options were discussed and I feel that she should have exploration of the lumbar wound for probable attempt at repair of dural leakage.  Dural leakage was noted time surgery at the L4 level where she had a massive disc herniation.  An attempt was done to repair it but obviously there is still months be some leakage of cerebrospinal fluid.  Do not think an MRI would help us has clinically all symptoms and findings point to a cerebral spinal fluid leakage and not a seroma.  Patient and mother agree with  this treatment plan and surgery will be scheduled for August 20      This note was created using Dragon voice recognition software that occasionally misinterpreted phrases or words.

## 2020-08-19 NOTE — H&P (VIEW-ONLY)
Subjective:    Patient ID: Aysha Waters is a 19 y.o. female.    Chief Complaint: Post-op Evaluation of the Lumbar Spine ( wound check s/p lumbar lamincectomy 8-1. She has no lumbar pain but severe headache that started yesterday, dry heaves, freezing then sweating. Lumbar is draining alot)      History of Present Illness  Previous history of multilevel laminectomy.  Patient since Monday has noted some moderately severe headaches and sitting erect and the drainage from her lumbar incision has not ceased.    Current Medications  Current Outpatient Medications   Medication Sig Dispense Refill    ethynodiol-ethinyl estradiol (ZOVIA) 1-50 mg-mcg per tablet Take 1 tablet by mouth once daily.      FLUoxetine (PROZAC) 20 MG capsule Take 20 mg by mouth once daily.      gabapentin (NEURONTIN) 300 MG capsule Take 1 capsule (300 mg total) by mouth every evening. 30 capsule 1    hydroCHLOROthiazide (HYDRODIURIL) 12.5 MG Tab Take 1 tablet (12.5 mg total) by mouth once daily. 30 tablet 11    lisinopril (PRINIVIL,ZESTRIL) 40 MG tablet Take 1 tablet (40 mg total) by mouth once daily. 90 tablet 3    metFORMIN (GLUCOPHAGE) 500 MG tablet Take 1,000 mg by mouth 2 (two) times daily with meals.       methocarbamoL (ROBAXIN) 750 MG Tab Take 1-2 tablets by mouth every 8 hours as needed for muscle spasms 30 tablet 0    traMADoL (ULTRAM) 50 mg tablet Take 1-2 po q 6 hours prn pain. 56 tablet 0    tretinoin (RETIN-A) 0.05 % cream Apply topically every evening. 45 g 0     No current facility-administered medications for this visit.        Allergies  Review of patient's allergies indicates:  No Known Allergies    Past Medical History  Past Medical History:   Diagnosis Date    Anxiety     Depression     Hypertension     Insulin resistance     Knee derangement     Obesity     PCOS (polycystic ovarian syndrome)     Pinched nerve        Surgical History  Past Surgical History:   Procedure Laterality Date    KNEE ARTHROSCOPY,  MEDIAL PATELLO FEMORAL LIGAMENT REPAIR      LAMINECTOMY N/A 8/11/2020    Procedure: LAMINECTOMY, SPINE L3, L4, L5;  Surgeon: Nirav Spain MD;  Location: [facility];  Service: Orthopedics;  Laterality: N/A;    MOUTH SURGERY      petalla surgery Left 07/2019    medically broke the tib/fib and moved the pettella, no replacement  Dr Mayank Hugo [facility]    TONSILLECTOMY         Family History:   Family History   Problem Relation Age of Onset    Heart disease Father     Hypertension Father     Obesity Sister     Heart disease Paternal Grandfather        Social History:   Social History     Socioeconomic History    Marital status: Single     Spouse name: Not on file    Number of children: Not on file    Years of education: Not on file    Highest education level: Not on file   Occupational History    Not on file   Social Needs    Financial resource strain: Not very hard    Food insecurity     Worry: Never true     Inability: Never true    Transportation needs     Medical: No     Non-medical: No   Tobacco Use    Smoking status: Never Smoker    Smokeless tobacco: Never Used   Substance and Sexual Activity    Alcohol use: No     Frequency: Never     Binge frequency: Never    Drug use: No    Sexual activity: Never     Birth control/protection: OCP   Lifestyle    Physical activity     Days per week: 2 days     Minutes per session: 30 min    Stress: Rather much   Relationships    Social connections     Talks on phone: Three times a week     Gets together: More than three times a week     Attends Orthodox service: Not on file     Active member of club or organization: No     Attends meetings of clubs or organizations: Never     Relationship status: Never    Other Topics Concern    Not on file   Social History Narrative    Lives with parents and one sibling. In 9th grade, all As       Date of surgery:     Review of Systems     General/Constitutional: Chills denies. Fatigue denies.  Fever denies. Weight gain denies. Weight loss denies.    Musculoskeletal: Comments: See HPI for details    Skin: Rash denies.    Objective:   Vital Signs:   Vitals:    08/19/20 1010   BP: 102/60   Pulse: 70   Temp: 96.7 °F (35.9 °C)        Physical Exam    This a well-developed, well nourished patient in no acute distress.  They are alert and oriented and cooperative to examination.  Pt. walks without an antalgic gait.      General Examination:     Constitutional: The patient is alert and oriented to lace person and time. Mood is pleasant.     Head/Face: Normal facial features normal eyebrows    Eyes: Normal extraocular motion bilaterally    Lungs: Respirations are equal and unlabored    Gait is coordinated.    Cardiovascular: There are no swelling or varicosities present.    Lymphatic: Negative for adenopathy    Skin: Normal    Neurological: Level of consciousness normal. Oriented to place person and time and situation    Psychiatric: Oriented to time place person and situation    CC staples are in place some serous drainage noted from the incision patient does have headaches when sitting erect neurovascular status normal  XRAY Report/ Interpretation:        Assessment:       1. CSF leak    2. Status post lumbar laminectomy        Plan:       Aysha was seen today for post-op evaluation.    Diagnoses and all orders for this visit:    CSF leak    Status post lumbar laminectomy         No follow-ups on file.    Treatment options were discussed and I feel that she should have exploration of the lumbar wound for probable attempt at repair of dural leakage.  Dural leakage was noted time surgery at the L4 level where she had a massive disc herniation.  An attempt was done to repair it but obviously there is still months be some leakage of cerebrospinal fluid.  Do not think an MRI would help us has clinically all symptoms and findings point to a cerebral spinal fluid leakage and not a seroma.  Patient and mother agree with  this treatment plan and surgery will be scheduled for August 20      This note was created using Dragon voice recognition software that occasionally misinterpreted phrases or words.

## 2020-08-19 NOTE — LETTER
August 19, 2020      [facility]  [address]  [place] [address]  Phone: [phone]  Fax: [phone]       Patient: Aysha Waters   YOB: 2000  Date of Visit: 08/19/2020    To Whom It May Concern:    Rylan Waters  was at our office on 08/19/2020.  Mrs. Waters had L3, L4, L5 Laminectomy surgery on August 11, 2020.  She is scheduled August 20, 2020 for another Lumbar surgery. Please excuse her from work from 08/19/2020 until discharged from Dr Spain's care.       Sincerely,      Dr Nirav Spain

## 2020-08-20 ENCOUNTER — HOSPITAL ENCOUNTER (INPATIENT)
Facility: HOSPITAL | Age: 20
LOS: 2 days | Discharge: HOME HEALTH | DRG: 029 | End: 2020-08-22
Attending: ORTHOPAEDIC SURGERY | Admitting: INTERNAL MEDICINE
Payer: COMMERCIAL

## 2020-08-20 ENCOUNTER — ANESTHESIA (OUTPATIENT)
Dept: SURGERY | Facility: HOSPITAL | Age: 20
DRG: 029 | End: 2020-08-20
Payer: COMMERCIAL

## 2020-08-20 DIAGNOSIS — G96.00 CSF LEAK: Primary | ICD-10-CM

## 2020-08-20 PROBLEM — N30.00 ACUTE CYSTITIS WITHOUT HEMATURIA: Status: ACTIVE | Noted: 2020-08-20

## 2020-08-20 LAB
ABO + RH BLD: NORMAL
ANION GAP SERPL CALC-SCNC: 15 MMOL/L (ref 8–16)
BACTERIA #/AREA URNS HPF: ABNORMAL /HPF
BASOPHILS # BLD AUTO: 0.04 K/UL (ref 0–0.2)
BASOPHILS NFR BLD: 0.5 % (ref 0–1.9)
BILIRUB UR QL STRIP: NEGATIVE
BLD GP AB SCN CELLS X3 SERPL QL: NORMAL
BUN SERPL-MCNC: 8 MG/DL (ref 6–20)
CALCIUM SERPL-MCNC: 8.6 MG/DL (ref 8.7–10.5)
CHLORIDE SERPL-SCNC: 106 MMOL/L (ref 95–110)
CLARITY UR: ABNORMAL
CO2 SERPL-SCNC: 19 MMOL/L (ref 23–29)
COLOR UR: YELLOW
CREAT SERPL-MCNC: 0.7 MG/DL (ref 0.5–1.4)
DIFFERENTIAL METHOD: ABNORMAL
EOSINOPHIL # BLD AUTO: 0.2 K/UL (ref 0–0.5)
EOSINOPHIL NFR BLD: 1.9 % (ref 0–8)
ERYTHROCYTE [DISTWIDTH] IN BLOOD BY AUTOMATED COUNT: 13.7 % (ref 11.5–14.5)
EST. GFR  (AFRICAN AMERICAN): >60 ML/MIN/1.73 M^2
EST. GFR  (NON AFRICAN AMERICAN): >60 ML/MIN/1.73 M^2
GLUCOSE SERPL-MCNC: 113 MG/DL (ref 70–110)
GLUCOSE UR QL STRIP: NEGATIVE
HCT VFR BLD AUTO: 29.2 % (ref 37–48.5)
HGB BLD-MCNC: 8.8 G/DL (ref 12–16)
HGB UR QL STRIP: NEGATIVE
HYPOCHROMIA BLD QL SMEAR: ABNORMAL
IMM GRANULOCYTES # BLD AUTO: 0.16 K/UL (ref 0–0.04)
IMM GRANULOCYTES NFR BLD AUTO: 1.8 % (ref 0–0.5)
KETONES UR QL STRIP: NEGATIVE
LEUKOCYTE ESTERASE UR QL STRIP: ABNORMAL
LYMPHOCYTES # BLD AUTO: 1.9 K/UL (ref 1–4.8)
LYMPHOCYTES NFR BLD: 21.4 % (ref 18–48)
MCH RBC QN AUTO: 25.6 PG (ref 27–31)
MCHC RBC AUTO-ENTMCNC: 30.1 G/DL (ref 32–36)
MCV RBC AUTO: 85 FL (ref 82–98)
MICROSCOPIC COMMENT: ABNORMAL
MONOCYTES # BLD AUTO: 0.3 K/UL (ref 0.3–1)
MONOCYTES NFR BLD: 3.9 % (ref 4–15)
NEUTROPHILS # BLD AUTO: 6.2 K/UL (ref 1.8–7.7)
NEUTROPHILS NFR BLD: 70.5 % (ref 38–73)
NITRITE UR QL STRIP: POSITIVE
NRBC BLD-RTO: 0 /100 WBC
PH UR STRIP: 6 [PH] (ref 5–8)
PLATELET # BLD AUTO: 288 K/UL (ref 150–350)
PLATELET BLD QL SMEAR: ABNORMAL
PMV BLD AUTO: 10.5 FL (ref 9.2–12.9)
POTASSIUM SERPL-SCNC: 4 MMOL/L (ref 3.5–5.1)
PROT UR QL STRIP: NEGATIVE
RBC # BLD AUTO: 3.44 M/UL (ref 4–5.4)
RBC #/AREA URNS HPF: 2 /HPF (ref 0–4)
SARS-COV-2 RDRP RESP QL NAA+PROBE: NEGATIVE
SODIUM SERPL-SCNC: 140 MMOL/L (ref 136–145)
SP GR UR STRIP: >=1.03 (ref 1–1.03)
SQUAMOUS #/AREA URNS HPF: 15 /HPF
URN SPEC COLLECT METH UR: ABNORMAL
UROBILINOGEN UR STRIP-ACNC: NEGATIVE EU/DL
WBC # BLD AUTO: 8.83 K/UL (ref 3.9–12.7)
WBC #/AREA URNS HPF: 13 /HPF (ref 0–5)

## 2020-08-20 PROCEDURE — 37000009 HC ANESTHESIA EA ADD 15 MINS: Performed by: ORTHOPAEDIC SURGERY

## 2020-08-20 PROCEDURE — 36000711: Performed by: ORTHOPAEDIC SURGERY

## 2020-08-20 PROCEDURE — D9220A PRA ANESTHESIA: ICD-10-PCS | Mod: CRNA,,, | Performed by: NURSE ANESTHETIST, CERTIFIED REGISTERED

## 2020-08-20 PROCEDURE — C9399 UNCLASSIFIED DRUGS OR BIOLOG: HCPCS | Performed by: ORTHOPAEDIC SURGERY

## 2020-08-20 PROCEDURE — 25000003 PHARM REV CODE 250: Performed by: ORTHOPAEDIC SURGERY

## 2020-08-20 PROCEDURE — 63600175 PHARM REV CODE 636 W HCPCS: Performed by: ANESTHESIOLOGY

## 2020-08-20 PROCEDURE — 87086 URINE CULTURE/COLONY COUNT: CPT

## 2020-08-20 PROCEDURE — 25000003 PHARM REV CODE 250: Performed by: ANESTHESIOLOGY

## 2020-08-20 PROCEDURE — 25000003 PHARM REV CODE 250

## 2020-08-20 PROCEDURE — 94761 N-INVAS EAR/PLS OXIMETRY MLT: CPT

## 2020-08-20 PROCEDURE — 63600175 PHARM REV CODE 636 W HCPCS: Performed by: PHYSICIAN ASSISTANT

## 2020-08-20 PROCEDURE — 63600175 PHARM REV CODE 636 W HCPCS: Performed by: ORTHOPAEDIC SURGERY

## 2020-08-20 PROCEDURE — 86901 BLOOD TYPING SEROLOGIC RH(D): CPT

## 2020-08-20 PROCEDURE — 94799 UNLISTED PULMONARY SVC/PX: CPT

## 2020-08-20 PROCEDURE — 36000710: Performed by: ORTHOPAEDIC SURGERY

## 2020-08-20 PROCEDURE — 12000002 HC ACUTE/MED SURGE SEMI-PRIVATE ROOM

## 2020-08-20 PROCEDURE — 63600175 PHARM REV CODE 636 W HCPCS

## 2020-08-20 PROCEDURE — 99900035 HC TECH TIME PER 15 MIN (STAT)

## 2020-08-20 PROCEDURE — D9220A PRA ANESTHESIA: Mod: CRNA,,, | Performed by: NURSE ANESTHETIST, CERTIFIED REGISTERED

## 2020-08-20 PROCEDURE — 63600175 PHARM REV CODE 636 W HCPCS: Performed by: NURSE PRACTITIONER

## 2020-08-20 PROCEDURE — 37000008 HC ANESTHESIA 1ST 15 MINUTES: Performed by: ORTHOPAEDIC SURGERY

## 2020-08-20 PROCEDURE — 81000 URINALYSIS NONAUTO W/SCOPE: CPT

## 2020-08-20 PROCEDURE — U0002 COVID-19 LAB TEST NON-CDC: HCPCS

## 2020-08-20 PROCEDURE — 36415 COLL VENOUS BLD VENIPUNCTURE: CPT

## 2020-08-20 PROCEDURE — 94770 HC EXHALED C02 TEST: CPT

## 2020-08-20 PROCEDURE — 87186 SC STD MICRODIL/AGAR DIL: CPT

## 2020-08-20 PROCEDURE — 63600175 PHARM REV CODE 636 W HCPCS: Performed by: NURSE ANESTHETIST, CERTIFIED REGISTERED

## 2020-08-20 PROCEDURE — 25000003 PHARM REV CODE 250: Performed by: NURSE ANESTHETIST, CERTIFIED REGISTERED

## 2020-08-20 PROCEDURE — 99900103 DSU ONLY-NO CHARGE-INITIAL HR (STAT): Performed by: ORTHOPAEDIC SURGERY

## 2020-08-20 PROCEDURE — 25000003 PHARM REV CODE 250: Performed by: PHYSICIAN ASSISTANT

## 2020-08-20 PROCEDURE — 71000039 HC RECOVERY, EACH ADD'L HOUR: Performed by: ORTHOPAEDIC SURGERY

## 2020-08-20 PROCEDURE — 85025 COMPLETE CBC W/AUTO DIFF WBC: CPT

## 2020-08-20 PROCEDURE — D9220A PRA ANESTHESIA: ICD-10-PCS | Mod: ANES,,, | Performed by: ANESTHESIOLOGY

## 2020-08-20 PROCEDURE — 99900104 DSU ONLY-NO CHARGE-EA ADD'L HR (STAT): Performed by: ORTHOPAEDIC SURGERY

## 2020-08-20 PROCEDURE — 99900031 HC PATIENT EDUCATION (STAT)

## 2020-08-20 PROCEDURE — D9220A PRA ANESTHESIA: Mod: ANES,,, | Performed by: ANESTHESIOLOGY

## 2020-08-20 PROCEDURE — 87077 CULTURE AEROBIC IDENTIFY: CPT

## 2020-08-20 PROCEDURE — 87088 URINE BACTERIA CULTURE: CPT

## 2020-08-20 PROCEDURE — 80048 BASIC METABOLIC PNL TOTAL CA: CPT

## 2020-08-20 PROCEDURE — 71000033 HC RECOVERY, INTIAL HOUR: Performed by: ORTHOPAEDIC SURGERY

## 2020-08-20 RX ORDER — CEFAZOLIN SODIUM 2 G/50ML
2 SOLUTION INTRAVENOUS
Status: DISCONTINUED | OUTPATIENT
Start: 2020-08-20 | End: 2020-08-20

## 2020-08-20 RX ORDER — GABAPENTIN 300 MG/1
300 CAPSULE ORAL NIGHTLY
Status: DISCONTINUED | OUTPATIENT
Start: 2020-08-20 | End: 2020-08-22 | Stop reason: HOSPADM

## 2020-08-20 RX ORDER — KETOROLAC TROMETHAMINE 30 MG/ML
INJECTION, SOLUTION INTRAMUSCULAR; INTRAVENOUS
Status: DISCONTINUED | OUTPATIENT
Start: 2020-08-20 | End: 2020-08-20

## 2020-08-20 RX ORDER — OXYCODONE HYDROCHLORIDE 10 MG/1
10 TABLET ORAL EVERY 4 HOURS PRN
Status: DISCONTINUED | OUTPATIENT
Start: 2020-08-20 | End: 2020-08-22 | Stop reason: HOSPADM

## 2020-08-20 RX ORDER — CEFAZOLIN SODIUM 2 G/50ML
2 SOLUTION INTRAVENOUS
Status: COMPLETED | OUTPATIENT
Start: 2020-08-20 | End: 2020-08-20

## 2020-08-20 RX ORDER — SODIUM CHLORIDE, SODIUM LACTATE, POTASSIUM CHLORIDE, CALCIUM CHLORIDE 600; 310; 30; 20 MG/100ML; MG/100ML; MG/100ML; MG/100ML
125 INJECTION, SOLUTION INTRAVENOUS CONTINUOUS
Status: DISCONTINUED | OUTPATIENT
Start: 2020-08-20 | End: 2020-08-22 | Stop reason: HOSPADM

## 2020-08-20 RX ORDER — DEXAMETHASONE SODIUM PHOSPHATE 4 MG/ML
INJECTION, SOLUTION INTRA-ARTICULAR; INTRALESIONAL; INTRAMUSCULAR; INTRAVENOUS; SOFT TISSUE
Status: DISCONTINUED | OUTPATIENT
Start: 2020-08-20 | End: 2020-08-20

## 2020-08-20 RX ORDER — HYDROMORPHONE HYDROCHLORIDE 2 MG/ML
0.2 INJECTION, SOLUTION INTRAMUSCULAR; INTRAVENOUS; SUBCUTANEOUS EVERY 5 MIN PRN
Status: COMPLETED | OUTPATIENT
Start: 2020-08-20 | End: 2020-08-20

## 2020-08-20 RX ORDER — SUCCINYLCHOLINE CHLORIDE 20 MG/ML
INJECTION INTRAMUSCULAR; INTRAVENOUS
Status: DISCONTINUED | OUTPATIENT
Start: 2020-08-20 | End: 2020-08-20

## 2020-08-20 RX ORDER — ACETAMINOPHEN 10 MG/ML
INJECTION, SOLUTION INTRAVENOUS
Status: DISCONTINUED | OUTPATIENT
Start: 2020-08-20 | End: 2020-08-20

## 2020-08-20 RX ORDER — MAG HYDROX/ALUMINUM HYD/SIMETH 200-200-20
30 SUSPENSION, ORAL (FINAL DOSE FORM) ORAL EVERY 4 HOURS PRN
Status: DISCONTINUED | OUTPATIENT
Start: 2020-08-20 | End: 2020-08-22 | Stop reason: HOSPADM

## 2020-08-20 RX ORDER — MIDAZOLAM HYDROCHLORIDE 1 MG/ML
INJECTION, SOLUTION INTRAMUSCULAR; INTRAVENOUS
Status: DISCONTINUED | OUTPATIENT
Start: 2020-08-20 | End: 2020-08-20

## 2020-08-20 RX ORDER — MUPIROCIN 20 MG/G
OINTMENT TOPICAL
Status: DISCONTINUED | OUTPATIENT
Start: 2020-08-20 | End: 2020-08-20 | Stop reason: HOSPADM

## 2020-08-20 RX ORDER — ONDANSETRON 2 MG/ML
INJECTION INTRAMUSCULAR; INTRAVENOUS
Status: DISCONTINUED | OUTPATIENT
Start: 2020-08-20 | End: 2020-08-20

## 2020-08-20 RX ORDER — OXYCODONE HYDROCHLORIDE 5 MG/1
5 TABLET ORAL EVERY 4 HOURS PRN
Status: DISCONTINUED | OUTPATIENT
Start: 2020-08-20 | End: 2020-08-22 | Stop reason: HOSPADM

## 2020-08-20 RX ORDER — NALOXONE HCL 0.4 MG/ML
0.02 VIAL (ML) INJECTION
Status: DISCONTINUED | OUTPATIENT
Start: 2020-08-20 | End: 2020-08-22 | Stop reason: HOSPADM

## 2020-08-20 RX ORDER — OXYCODONE HYDROCHLORIDE 5 MG/1
5 TABLET ORAL
Status: DISCONTINUED | OUTPATIENT
Start: 2020-08-20 | End: 2020-08-20 | Stop reason: HOSPADM

## 2020-08-20 RX ORDER — CEFAZOLIN SODIUM 2 G/50ML
2 SOLUTION INTRAVENOUS
Status: COMPLETED | OUTPATIENT
Start: 2020-08-20 | End: 2020-08-21

## 2020-08-20 RX ORDER — HYDROCHLOROTHIAZIDE 12.5 MG/1
12.5 TABLET ORAL DAILY
Status: DISCONTINUED | OUTPATIENT
Start: 2020-08-20 | End: 2020-08-22 | Stop reason: HOSPADM

## 2020-08-20 RX ORDER — GLYCOPYRROLATE 0.2 MG/ML
INJECTION INTRAMUSCULAR; INTRAVENOUS
Status: DISCONTINUED | OUTPATIENT
Start: 2020-08-20 | End: 2020-08-20

## 2020-08-20 RX ORDER — FENTANYL CITRATE 50 UG/ML
25 INJECTION, SOLUTION INTRAMUSCULAR; INTRAVENOUS EVERY 5 MIN PRN
Status: DISCONTINUED | OUTPATIENT
Start: 2020-08-20 | End: 2020-08-20 | Stop reason: HOSPADM

## 2020-08-20 RX ORDER — ONDANSETRON 2 MG/ML
4 INJECTION INTRAMUSCULAR; INTRAVENOUS ONCE AS NEEDED
Status: DISCONTINUED | OUTPATIENT
Start: 2020-08-20 | End: 2020-08-20 | Stop reason: HOSPADM

## 2020-08-20 RX ORDER — ACETAMINOPHEN 10 MG/ML
1000 INJECTION, SOLUTION INTRAVENOUS EVERY 8 HOURS
Status: COMPLETED | OUTPATIENT
Start: 2020-08-20 | End: 2020-08-21

## 2020-08-20 RX ORDER — AMOXICILLIN 250 MG
2 CAPSULE ORAL NIGHTLY PRN
Status: DISCONTINUED | OUTPATIENT
Start: 2020-08-20 | End: 2020-08-22 | Stop reason: HOSPADM

## 2020-08-20 RX ORDER — DIPHENHYDRAMINE HCL 25 MG
50 CAPSULE ORAL EVERY 6 HOURS PRN
Status: DISCONTINUED | OUTPATIENT
Start: 2020-08-20 | End: 2020-08-22 | Stop reason: HOSPADM

## 2020-08-20 RX ORDER — MUPIROCIN 20 MG/G
1 OINTMENT TOPICAL 2 TIMES DAILY
Status: DISCONTINUED | OUTPATIENT
Start: 2020-08-20 | End: 2020-08-22 | Stop reason: HOSPADM

## 2020-08-20 RX ORDER — SODIUM CHLORIDE 0.9 % (FLUSH) 0.9 %
3 SYRINGE (ML) INJECTION
Status: DISCONTINUED | OUTPATIENT
Start: 2020-08-20 | End: 2020-08-20 | Stop reason: HOSPADM

## 2020-08-20 RX ORDER — ONDANSETRON 8 MG/1
8 TABLET, ORALLY DISINTEGRATING ORAL EVERY 6 HOURS PRN
Status: DISCONTINUED | OUTPATIENT
Start: 2020-08-20 | End: 2020-08-22 | Stop reason: HOSPADM

## 2020-08-20 RX ORDER — HYDROMORPHONE HCL IN 0.9% NACL 6 MG/30 ML
PATIENT CONTROLLED ANALGESIA SYRINGE INTRAVENOUS CONTINUOUS
Status: DISCONTINUED | OUTPATIENT
Start: 2020-08-20 | End: 2020-08-21

## 2020-08-20 RX ORDER — ROCURONIUM BROMIDE 10 MG/ML
INJECTION, SOLUTION INTRAVENOUS
Status: DISCONTINUED | OUTPATIENT
Start: 2020-08-20 | End: 2020-08-20

## 2020-08-20 RX ORDER — LISINOPRIL 40 MG/1
40 TABLET ORAL DAILY
Status: DISCONTINUED | OUTPATIENT
Start: 2020-08-20 | End: 2020-08-22 | Stop reason: HOSPADM

## 2020-08-20 RX ORDER — FENTANYL CITRATE 50 UG/ML
INJECTION, SOLUTION INTRAMUSCULAR; INTRAVENOUS
Status: DISCONTINUED | OUTPATIENT
Start: 2020-08-20 | End: 2020-08-20

## 2020-08-20 RX ORDER — BISACODYL 10 MG
10 SUPPOSITORY, RECTAL RECTAL DAILY
Status: DISCONTINUED | OUTPATIENT
Start: 2020-08-20 | End: 2020-08-22 | Stop reason: HOSPADM

## 2020-08-20 RX ORDER — OXYCODONE AND ACETAMINOPHEN 10; 325 MG/1; MG/1
1 TABLET ORAL EVERY 4 HOURS PRN
Qty: 40 TABLET | Refills: 0 | Status: SHIPPED | OUTPATIENT
Start: 2020-08-20 | End: 2020-08-27

## 2020-08-20 RX ORDER — ETHYNODIOL DIACETATE AND ETHINYL ESTRADIOL 1 MG-50MCG
1 KIT ORAL DAILY
Status: DISCONTINUED | OUTPATIENT
Start: 2020-08-20 | End: 2020-08-20

## 2020-08-20 RX ORDER — METHOCARBAMOL 750 MG/1
750 TABLET, FILM COATED ORAL 3 TIMES DAILY
Status: DISCONTINUED | OUTPATIENT
Start: 2020-08-20 | End: 2020-08-22 | Stop reason: HOSPADM

## 2020-08-20 RX ORDER — LIDOCAINE HCL/PF 100 MG/5ML
SYRINGE (ML) INTRAVENOUS
Status: DISCONTINUED | OUTPATIENT
Start: 2020-08-20 | End: 2020-08-20

## 2020-08-20 RX ORDER — DOCUSATE SODIUM 100 MG/1
100 CAPSULE, LIQUID FILLED ORAL DAILY
Status: DISCONTINUED | OUTPATIENT
Start: 2020-08-20 | End: 2020-08-22 | Stop reason: HOSPADM

## 2020-08-20 RX ORDER — METFORMIN HYDROCHLORIDE 500 MG/1
1000 TABLET ORAL 2 TIMES DAILY WITH MEALS
Status: DISCONTINUED | OUTPATIENT
Start: 2020-08-20 | End: 2020-08-22 | Stop reason: HOSPADM

## 2020-08-20 RX ORDER — DIPHENHYDRAMINE HYDROCHLORIDE 50 MG/ML
25 INJECTION INTRAMUSCULAR; INTRAVENOUS EVERY 6 HOURS PRN
Status: DISCONTINUED | OUTPATIENT
Start: 2020-08-20 | End: 2020-08-20 | Stop reason: HOSPADM

## 2020-08-20 RX ORDER — HYDROMORPHONE HYDROCHLORIDE 2 MG/ML
2 INJECTION, SOLUTION INTRAMUSCULAR; INTRAVENOUS; SUBCUTANEOUS
Status: DISCONTINUED | OUTPATIENT
Start: 2020-08-20 | End: 2020-08-22 | Stop reason: HOSPADM

## 2020-08-20 RX ORDER — NEOSTIGMINE METHYLSULFATE 1 MG/ML
INJECTION, SOLUTION INTRAVENOUS
Status: DISCONTINUED | OUTPATIENT
Start: 2020-08-20 | End: 2020-08-20

## 2020-08-20 RX ORDER — ACETAMINOPHEN 325 MG/1
650 TABLET ORAL EVERY 6 HOURS PRN
Status: DISCONTINUED | OUTPATIENT
Start: 2020-08-21 | End: 2020-08-22 | Stop reason: HOSPADM

## 2020-08-20 RX ORDER — PROPOFOL 10 MG/ML
VIAL (ML) INTRAVENOUS
Status: DISCONTINUED | OUTPATIENT
Start: 2020-08-20 | End: 2020-08-20

## 2020-08-20 RX ORDER — SODIUM CHLORIDE, SODIUM LACTATE, POTASSIUM CHLORIDE, CALCIUM CHLORIDE 600; 310; 30; 20 MG/100ML; MG/100ML; MG/100ML; MG/100ML
75 INJECTION, SOLUTION INTRAVENOUS CONTINUOUS
Status: DISCONTINUED | OUTPATIENT
Start: 2020-08-20 | End: 2020-08-22 | Stop reason: HOSPADM

## 2020-08-20 RX ORDER — FLUOXETINE HYDROCHLORIDE 20 MG/1
20 CAPSULE ORAL DAILY
Status: DISCONTINUED | OUTPATIENT
Start: 2020-08-20 | End: 2020-08-22 | Stop reason: HOSPADM

## 2020-08-20 RX ADMIN — SUCCINYLCHOLINE CHLORIDE 160 MG: 20 INJECTION, SOLUTION INTRAMUSCULAR; INTRAVENOUS; PARENTERAL at 09:08

## 2020-08-20 RX ADMIN — HYDROMORPHONE HYDROCHLORIDE 0.2 MG: 2 INJECTION INTRAMUSCULAR; INTRAVENOUS; SUBCUTANEOUS at 11:08

## 2020-08-20 RX ADMIN — SODIUM CHLORIDE, SODIUM LACTATE, POTASSIUM CHLORIDE, AND CALCIUM CHLORIDE 125 ML/HR: .6; .31; .03; .02 INJECTION, SOLUTION INTRAVENOUS at 08:08

## 2020-08-20 RX ADMIN — FENTANYL CITRATE 50 MCG: 50 INJECTION, SOLUTION INTRAMUSCULAR; INTRAVENOUS at 10:08

## 2020-08-20 RX ADMIN — Medication: at 12:08

## 2020-08-20 RX ADMIN — HYDROMORPHONE HYDROCHLORIDE 0.2 MG: 2 INJECTION INTRAMUSCULAR; INTRAVENOUS; SUBCUTANEOUS at 12:08

## 2020-08-20 RX ADMIN — PROPOFOL 200 MG: 10 INJECTION, EMULSION INTRAVENOUS at 09:08

## 2020-08-20 RX ADMIN — ONDANSETRON 4 MG: 2 INJECTION, SOLUTION INTRAMUSCULAR; INTRAVENOUS at 10:08

## 2020-08-20 RX ADMIN — ACETAMINOPHEN 1000 MG: 10 INJECTION, SOLUTION INTRAVENOUS at 04:08

## 2020-08-20 RX ADMIN — SODIUM CHLORIDE, SODIUM GLUCONATE, SODIUM ACETATE, POTASSIUM CHLORIDE, MAGNESIUM CHLORIDE, SODIUM PHOSPHATE, DIBASIC, AND POTASSIUM PHOSPHATE: .53; .5; .37; .037; .03; .012; .00082 INJECTION, SOLUTION INTRAVENOUS at 08:08

## 2020-08-20 RX ADMIN — MUPIROCIN: 20 OINTMENT TOPICAL at 08:08

## 2020-08-20 RX ADMIN — LIDOCAINE HYDROCHLORIDE 75 MG: 20 INJECTION, SOLUTION INTRAVENOUS at 09:08

## 2020-08-20 RX ADMIN — ACETAMINOPHEN 1000 MG: 10 INJECTION, SOLUTION INTRAVENOUS at 10:08

## 2020-08-20 RX ADMIN — METHOCARBAMOL TABLETS 750 MG: 750 TABLET, COATED ORAL at 03:08

## 2020-08-20 RX ADMIN — SODIUM CHLORIDE, SODIUM GLUCONATE, SODIUM ACETATE, POTASSIUM CHLORIDE, MAGNESIUM CHLORIDE, SODIUM PHOSPHATE, DIBASIC, AND POTASSIUM PHOSPHATE: .53; .5; .37; .037; .03; .012; .00082 INJECTION, SOLUTION INTRAVENOUS at 10:08

## 2020-08-20 RX ADMIN — Medication: at 04:08

## 2020-08-20 RX ADMIN — DEXAMETHASONE SODIUM PHOSPHATE 4 MG: 4 INJECTION, SOLUTION INTRA-ARTICULAR; INTRALESIONAL; INTRAMUSCULAR; INTRAVENOUS; SOFT TISSUE at 10:08

## 2020-08-20 RX ADMIN — MIDAZOLAM 2 MG: 1 INJECTION INTRAMUSCULAR; INTRAVENOUS at 09:08

## 2020-08-20 RX ADMIN — HYDROMORPHONE HYDROCHLORIDE 0.02 MG: 2 INJECTION INTRAMUSCULAR; INTRAVENOUS; SUBCUTANEOUS at 11:08

## 2020-08-20 RX ADMIN — DOCUSATE SODIUM 100 MG: 100 CAPSULE, LIQUID FILLED ORAL at 01:08

## 2020-08-20 RX ADMIN — CEFAZOLIN SODIUM 2 G: 2 SOLUTION INTRAVENOUS at 03:08

## 2020-08-20 RX ADMIN — FENTANYL CITRATE 100 MCG: 50 INJECTION, SOLUTION INTRAMUSCULAR; INTRAVENOUS at 10:08

## 2020-08-20 RX ADMIN — MUPIROCIN 1 G: 20 OINTMENT TOPICAL at 08:08

## 2020-08-20 RX ADMIN — SODIUM CHLORIDE, SODIUM LACTATE, POTASSIUM CHLORIDE, AND CALCIUM CHLORIDE 125 ML/HR: .6; .31; .03; .02 INJECTION, SOLUTION INTRAVENOUS at 12:08

## 2020-08-20 RX ADMIN — CEFAZOLIN SODIUM 2 G: 2 SOLUTION INTRAVENOUS at 10:08

## 2020-08-20 RX ADMIN — NEOSTIGMINE METHYLSULFATE 3 MG: 1 INJECTION INTRAVENOUS at 10:08

## 2020-08-20 RX ADMIN — FENTANYL CITRATE 100 MCG: 50 INJECTION, SOLUTION INTRAMUSCULAR; INTRAVENOUS at 09:08

## 2020-08-20 RX ADMIN — FLUOXETINE HYDROCHLORIDE 20 MG: 20 CAPSULE ORAL at 03:08

## 2020-08-20 RX ADMIN — ROCURONIUM BROMIDE 10 MG: 10 INJECTION, SOLUTION INTRAVENOUS at 09:08

## 2020-08-20 RX ADMIN — KETOROLAC TROMETHAMINE 30 MG: 30 INJECTION, SOLUTION INTRAMUSCULAR; INTRAVENOUS at 10:08

## 2020-08-20 RX ADMIN — METFORMIN HYDROCHLORIDE 1000 MG: 500 TABLET, FILM COATED ORAL at 04:08

## 2020-08-20 RX ADMIN — GLYCOPYRROLATE 0.4 MG: 0.2 INJECTION, SOLUTION INTRAMUSCULAR; INTRAVENOUS at 10:08

## 2020-08-20 NOTE — BRIEF OP NOTE
[facility]  Brief Operative Note    SUMMARY     Surgery Date: 8/20/2020     Surgeon(s) and Role:     * Nirav Spain MD - Primary    Assisting Surgeon: None    Pre-op Diagnosis:  CSF leak [G96.0]    Post-op Diagnosis:  Post-Op Diagnosis Codes:     * CSF leak [G96.0] post lumbar laminectomy    Procedure(s) (LRB):  REPAIR, CSF LEAK, SPINE (N/A)    Anesthesia: General    Description of Procedure:  Lumbar wound exploration with repair durotomy L4 level right    Description of the findings of the procedure:  Durotomy 2 mm right L4-5 level    Estimated Blood Loss: 30 mL    Estimated Blood Loss has not been documented. EBL = 25 cc.         Specimens:  None  Specimen (12h ago, onward)    None          [id number]

## 2020-08-20 NOTE — OP NOTE
Dictation #1  MRN:[id number]  CSN:[id number]  [facility]  Orthopedic  Operative Note    SUMMARY     Date of Procedure: 8/20/2020     Procedure: Procedure(s) (LRB):  REPAIR, CSF LEAK, SPINE (N/A)   following previous lumbar laminectomy    Surgeon(s) and Role:     * Nirav Spain MD - Primary    Assisting Surgeon:  Conrad Aguilar    Pre-Operative Diagnosis: CSF leak [G96.0]    Post-Operative Diagnosis: Post-Op Diagnosis Codes:     * CSF leak [G96.0] following for this next me    Anesthesia: General    Procedure in General:  This lady had previously undergone a laminectomy at L3 5 levels for massive disc herniations at L4-5.  At the time of her initial surgery while in the process of performing the diskectomy on the right-sided L4-5 we did noted and incidental durotomy.  This was thought to be due to the large size of disc herniation and possible adherence of a portion of the disc annulus to the anterior takeoff of the right L5 nerve root.  We attempted to do a primary repair of a postoperatively patient had some persistent complaints of drainage and headaches consistent with symptomatic persistent leakage of cerebrospinal fluid.  This warranted exploration due to the patient's symptomatology.  Patient was brought to the operative room and loss spine was in dated a catheter was placed in the bladder I a time-out performed and she was some frame with the abdomen allowed to hang free we did note a significant amount of cerebral spinal fluid leaking from between the staples of the incision.  The staples were removed and a back was cleansed with alcohol and with ChloraPrep solution.  The patient was given intravenous antibiotics.  Previous wound and incision was opened from L3 to the sacrum and a self-retaining retractor was inserted.  The extubated mild of cerebrospinal fluid and then encountered the area of the laminectomy pre on the right side at the L4-5 level.  We saw previous sutures performed  at the time of the initial procedure however careful probing around this use the there was a persistence cerebral spinal fluid leakage.  No other areas of cerebral spinal fluid leak visual durotomy were demonstrate with the exploration of this area.  Using a 4 0 Nurolon suture we then repaired the durotomy with a running 4 O Nurolon suture.  The patient was placed both in Gio can reverse Trendelenburg and Valsalva maneuvers were performed we confirmed that there was no further leakage of spinal fluid whatsoever.  We then prepared a Tisseel hemostatic sealant and applied a thin layer over the durotomy repair.  Once the Tisseel had set we then tightly closed the lumbodorsal fascia with interrupted Vicryl suture then the subcutaneous tissues with interrupted Vicryl suture and staples on the skin sterile dressings were applied and patient was then turned supine and extubated and brought to recovery room            Complications: None    Estimated Blood Loss (EBL):            Implants: * No implants in log *    Specimens:   Specimen (12h ago, onward)    None                  Condition: Good    Disposition: PACU - hemodynamically stable.    Attestation: I was present and scrubbed for the entire procedure.

## 2020-08-20 NOTE — SUBJECTIVE & OBJECTIVE
Past Medical History:   Diagnosis Date    Anxiety     Depression     Hypertension     Insulin resistance     Knee derangement     Obesity     PCOS (polycystic ovarian syndrome)     Pinched nerve        Past Surgical History:   Procedure Laterality Date    KNEE ARTHROSCOPY, MEDIAL PATELLO FEMORAL LIGAMENT REPAIR      LAMINECTOMY N/A 8/11/2020    Procedure: LAMINECTOMY, SPINE L3, L4, L5;  Surgeon: Nirav Spain MD;  Location: [facility];  Service: Orthopedics;  Laterality: N/A;    MOUTH SURGERY      petalla surgery Left 07/2019    medically broke the tib/fib and moved the pettella, no replacement  Dr Talley [facility]    TONSILLECTOMY         Review of patient's allergies indicates:  No Known Allergies    No current facility-administered medications on file prior to encounter.      Current Outpatient Medications on File Prior to Encounter   Medication Sig    ethynodiol-ethinyl estradiol (ZOVIA) 1-50 mg-mcg per tablet Take 1 tablet by mouth once daily.    FLUoxetine (PROZAC) 20 MG capsule Take 20 mg by mouth once daily.    gabapentin (NEURONTIN) 300 MG capsule Take 1 capsule (300 mg total) by mouth every evening.    hydroCHLOROthiazide (HYDRODIURIL) 12.5 MG Tab Take 1 tablet (12.5 mg total) by mouth once daily.    lisinopril (PRINIVIL,ZESTRIL) 40 MG tablet Take 1 tablet (40 mg total) by mouth once daily.    metFORMIN (GLUCOPHAGE) 500 MG tablet Take 1,000 mg by mouth 2 (two) times daily with meals.     methocarbamoL (ROBAXIN) 750 MG Tab Take 1-2 tablets by mouth every 8 hours as needed for muscle spasms    traMADoL (ULTRAM) 50 mg tablet Take 1-2 po q 6 hours prn pain.    tretinoin (RETIN-A) 0.05 % cream Apply topically every evening.     Family History     Problem Relation (Age of Onset)    Heart disease Father, Paternal Grandfather    Hypertension Father    Obesity Sister        Tobacco Use    Smoking status: Never Smoker    Smokeless tobacco: Never Used   Substance and  Sexual Activity    Alcohol use: No     Frequency: Never     Binge frequency: Never    Drug use: No    Sexual activity: Never     Birth control/protection: OCP     Review of Systems   Constitutional: Negative for chills, fatigue and fever.   HENT: Negative for congestion, postnasal drip and trouble swallowing.    Eyes: Negative for photophobia and visual disturbance.   Respiratory: Negative for cough, shortness of breath and wheezing.    Cardiovascular: Negative for chest pain and leg swelling.   Gastrointestinal: Negative for abdominal distention, nausea and vomiting.   Genitourinary: Negative for difficulty urinating and dysuria.   Musculoskeletal: Positive for back pain. Negative for arthralgias.   Neurological: Negative for dizziness, weakness, light-headedness and headaches.   Psychiatric/Behavioral: Negative for confusion.     Objective:     Vital Signs (Most Recent):  Temp: 97.6 °F (36.4 °C) (08/20/20 1215)  Pulse: 64 (08/20/20 1215)  Resp: 12 (08/20/20 1232)  BP: 121/69 (08/20/20 1215)  SpO2: 95 % (08/20/20 1215) Vital Signs (24h Range):  Temp:  [97.3 °F (36.3 °C)-98.1 °F (36.7 °C)] 97.6 °F (36.4 °C)  Pulse:  [60-85] 64  Resp:  [0-20] 12  SpO2:  [95 %-100 %] 95 %  BP: (121-134)/(67-75) 121/69     Weight: 111.1 kg (245 lb)  Body mass index is 39.54 kg/m².    Physical Exam  Vitals signs and nursing note reviewed.   Constitutional:       Appearance: Normal appearance. She is obese.   HENT:      Head: Normocephalic and atraumatic.      Mouth/Throat:      Mouth: Mucous membranes are moist.      Pharynx: Oropharynx is clear.   Eyes:      Extraocular Movements: Extraocular movements intact.      Conjunctiva/sclera: Conjunctivae normal.      Pupils: Pupils are equal, round, and reactive to light.   Neck:      Musculoskeletal: Normal range of motion and neck supple.   Cardiovascular:      Rate and Rhythm: Normal rate and regular rhythm.      Pulses: Normal pulses.      Heart sounds: Normal heart sounds.    Pulmonary:      Effort: Pulmonary effort is normal.      Breath sounds: Normal breath sounds.   Abdominal:      General: Abdomen is flat. Bowel sounds are normal.      Palpations: Abdomen is soft.   Musculoskeletal: Normal range of motion.   Skin:     General: Skin is warm and dry.      Capillary Refill: Capillary refill takes less than 2 seconds.      Comments: Back incision not viewed due to bed rest requirement   Neurological:      General: No focal deficit present.      Mental Status: She is alert and oriented to person, place, and time. Mental status is at baseline.      Sensory: No sensory deficit.   Psychiatric:         Mood and Affect: Mood normal.         Behavior: Behavior normal.           CRANIAL NERVES     CN III, IV, VI   Pupils are equal, round, and reactive to light.       Significant Labs:   CMP:   Recent Labs   Lab 08/20/20  1144      K 4.0      CO2 19*   *   BUN 8   CREATININE 0.7   CALCIUM 8.6*   ANIONGAP 15   EGFRNONAA >60

## 2020-08-20 NOTE — H&P
[facility] Medicine  History & Physical    Patient Name: Aysha Waters  MRN: [id number]  Admission Date: 8/20/2020  Attending Physician: Zander Rowland MD  Primary Care Provider: Jamila Smith MD         Patient information was obtained from patient and past medical records.     Subjective:     Principal Problem:CSF leak    Chief Complaint:   Chief Complaint   Patient presents with    Headache    Nausea    Back Pain        HPI: Aysha Waters is a 19-year-old female PMHx significant for PCOS, HTN, obesity, and lumbar disc herniation S/P lumbar surgery 8/11 with Dr. Spain with complication of cerebral spinal fluid leak.  Pt reports HA, nausea, vomiting, and back pain upon discharge home with increased wound drainage.  She was evaluated in clinic by Dr. Spain who arranged for repeat wound exploration and dural repair.  Postoperatively, Pt reports back pain 5/10 in severity and well controlled with PCA.  She denies any nausea, vomiting, HA, chest pain, SOB, or abdominal complaints.  Incidentally, preoperative urinalysis reveals UTI.  Pt is on IV Abx postoperatively.  She was placed in outpatient recovery under the service of hospital Medicine for additional evaluation management.  Other pertinent medical Hx as below:    Past Medical History:   Diagnosis Date    Anxiety     Depression     Hypertension     Insulin resistance     Knee derangement     Obesity     PCOS (polycystic ovarian syndrome)     Pinched nerve        Past Surgical History:   Procedure Laterality Date    KNEE ARTHROSCOPY, MEDIAL PATELLO FEMORAL LIGAMENT REPAIR      LAMINECTOMY N/A 8/11/2020    Procedure: LAMINECTOMY, SPINE L3, L4, L5;  Surgeon: Nirav Spain MD;  Location: [facility];  Service: Orthopedics;  Laterality: N/A;    MOUTH SURGERY      petalla surgery Left 07/2019    medically broke the tib/fib and moved the pettella, no replacement  Dr Talley [facility]     TONSILLECTOMY         Review of patient's allergies indicates:  No Known Allergies    No current facility-administered medications on file prior to encounter.      Current Outpatient Medications on File Prior to Encounter   Medication Sig    ethynodiol-ethinyl estradiol (ZOVIA) 1-50 mg-mcg per tablet Take 1 tablet by mouth once daily.    FLUoxetine (PROZAC) 20 MG capsule Take 20 mg by mouth once daily.    gabapentin (NEURONTIN) 300 MG capsule Take 1 capsule (300 mg total) by mouth every evening.    hydroCHLOROthiazide (HYDRODIURIL) 12.5 MG Tab Take 1 tablet (12.5 mg total) by mouth once daily.    lisinopril (PRINIVIL,ZESTRIL) 40 MG tablet Take 1 tablet (40 mg total) by mouth once daily.    metFORMIN (GLUCOPHAGE) 500 MG tablet Take 1,000 mg by mouth 2 (two) times daily with meals.     methocarbamoL (ROBAXIN) 750 MG Tab Take 1-2 tablets by mouth every 8 hours as needed for muscle spasms    traMADoL (ULTRAM) 50 mg tablet Take 1-2 po q 6 hours prn pain.    tretinoin (RETIN-A) 0.05 % cream Apply topically every evening.     Family History     Problem Relation (Age of Onset)    Heart disease Father, Paternal Grandfather    Hypertension Father    Obesity Sister        Tobacco Use    Smoking status: Never Smoker    Smokeless tobacco: Never Used   Substance and Sexual Activity    Alcohol use: No     Frequency: Never     Binge frequency: Never    Drug use: No    Sexual activity: Never     Birth control/protection: OCP     Review of Systems   Constitutional: Negative for chills, fatigue and fever.   HENT: Negative for congestion, postnasal drip and trouble swallowing.    Eyes: Negative for photophobia and visual disturbance.   Respiratory: Negative for cough, shortness of breath and wheezing.    Cardiovascular: Negative for chest pain and leg swelling.   Gastrointestinal: Negative for abdominal distention, nausea and vomiting.   Genitourinary: Negative for difficulty urinating and dysuria.   Musculoskeletal:  Positive for back pain. Negative for arthralgias.   Neurological: Negative for dizziness, weakness, light-headedness and headaches.   Psychiatric/Behavioral: Negative for confusion.     Objective:     Vital Signs (Most Recent):  Temp: 97.6 °F (36.4 °C) (08/20/20 1215)  Pulse: 64 (08/20/20 1215)  Resp: 12 (08/20/20 1232)  BP: 121/69 (08/20/20 1215)  SpO2: 95 % (08/20/20 1215) Vital Signs (24h Range):  Temp:  [97.3 °F (36.3 °C)-98.1 °F (36.7 °C)] 97.6 °F (36.4 °C)  Pulse:  [60-85] 64  Resp:  [0-20] 12  SpO2:  [95 %-100 %] 95 %  BP: (121-134)/(67-75) 121/69     Weight: 111.1 kg (245 lb)  Body mass index is 39.54 kg/m².    Physical Exam  Vitals signs and nursing note reviewed.   Constitutional:       Appearance: Normal appearance. She is obese.   HENT:      Head: Normocephalic and atraumatic.      Mouth/Throat:      Mouth: Mucous membranes are moist.      Pharynx: Oropharynx is clear.   Eyes:      Extraocular Movements: Extraocular movements intact.      Conjunctiva/sclera: Conjunctivae normal.      Pupils: Pupils are equal, round, and reactive to light.   Neck:      Musculoskeletal: Normal range of motion and neck supple.   Cardiovascular:      Rate and Rhythm: Normal rate and regular rhythm.      Pulses: Normal pulses.      Heart sounds: Normal heart sounds.   Pulmonary:      Effort: Pulmonary effort is normal.      Breath sounds: Normal breath sounds.   Abdominal:      General: Abdomen is flat. Bowel sounds are normal.      Palpations: Abdomen is soft.   Musculoskeletal: Normal range of motion.   Skin:     General: Skin is warm and dry.      Capillary Refill: Capillary refill takes less than 2 seconds.      Comments: Back incision not viewed due to bed rest requirement   Neurological:      General: No focal deficit present.      Mental Status: She is alert and oriented to person, place, and time. Mental status is at baseline.      Sensory: No sensory deficit.   Psychiatric:         Mood and Affect: Mood normal.          Behavior: Behavior normal.           CRANIAL NERVES     CN III, IV, VI   Pupils are equal, round, and reactive to light.       Significant Labs:   CMP:   Recent Labs   Lab 08/20/20  1144      K 4.0      CO2 19*   *   BUN 8   CREATININE 0.7   CALCIUM 8.6*   ANIONGAP 15   EGFRNONAA >60           Assessment/Plan:     * CSF leak  POD 0 s/p Lumbar wound exploration with repair durotomy L4 level right with Dr. Spain.  Continue strict bedrest-lay flat until 8/21 at 11:00 a.m.  Follow surgeon's orders.  Pain control-currently receiving PCA.      Ruptured lumbar intervertebral disc  Pt is S/P lumbar surgery with complication of cerebrospinal fluid leak with increasing symptoms requiring her wound exploration with dural repair.  Doing well postoperatively.  Pain control.        Acute cystitis without hematuria  Pt with preoperative UTI.  Continue Ancef for now.  Can transition to culture appropriate antibiotic upon discharge.      Essential hypertension  Chronic, controlled.  Latest blood pressure and vitals reviewed-   Temp:  [97.3 °F (36.3 °C)-98.1 °F (36.7 °C)]   Pulse:  [60-85]   Resp:  [0-20]   BP: (121-134)/(67-75)   SpO2:  [95 %-100 %] .   Home meds for hypertension were reviewed and noted below. Hospital anti-hypertensive changes were made as shown below.  Hypertension Medications             hydroCHLOROthiazide (HYDRODIURIL) 12.5 MG Tab Take 1 tablet (12.5 mg total) by mouth once daily.    lisinopril (PRINIVIL,ZESTRIL) 40 MG tablet Take 1 tablet (40 mg total) by mouth once daily.        Will utilize p.r.n. blood pressure medication only if patient's blood pressure greater than  180/110 and she develops symptoms such as worsening chest pain or shortness of breath.        Obesity  Body mass index is 39.54 kg/m².  Obesity compounds patient co-morbidities and complicates treatment course.  Counseling given regarding diet modification and exercise recommendations.        VTE Risk Mitigation (From  admission, onward)         Ordered     IP VTE LOW RISK PATIENT  Once      08/20/20 0739     Place sequential compression device  Until discontinued      08/20/20 0739     Place CARIDAD hose  Until discontinued      08/20/20 0739                   Jamila Hoover NP  Department of Hospital Medicine   [facility]

## 2020-08-20 NOTE — PLAN OF CARE
POC reviewed with pt and mother; pt verbalized understanding. Pt AAO x 4. Pt supine per order; bedrest maintained. Barron in place draining clear yellow urine. Room air. PCA pump in use. Pt afebrile. PIV cdi. IVF infusing per order. Abx administered. SCDs/CARIDAD hose in place. Surgical dressing cdi. Call bell in reach, bed locked, bed alarm on, and fall band and no skid socks on. Will continue to monitor.

## 2020-08-20 NOTE — ASSESSMENT & PLAN NOTE
Chronic, controlled.  Latest blood pressure and vitals reviewed-   Temp:  [97.3 °F (36.3 °C)-98.1 °F (36.7 °C)]   Pulse:  [60-85]   Resp:  [0-20]   BP: (121-134)/(67-75)   SpO2:  [95 %-100 %] .   Home meds for hypertension were reviewed and noted below. Hospital anti-hypertensive changes were made as shown below.  Hypertension Medications             hydroCHLOROthiazide (HYDRODIURIL) 12.5 MG Tab Take 1 tablet (12.5 mg total) by mouth once daily.    lisinopril (PRINIVIL,ZESTRIL) 40 MG tablet Take 1 tablet (40 mg total) by mouth once daily.        Will utilize p.r.n. blood pressure medication only if patient's blood pressure greater than  180/110 and she develops symptoms such as worsening chest pain or shortness of breath.

## 2020-08-20 NOTE — PLAN OF CARE
Report to Carmela. Patient states pain level 5, lying flat, resting comfortably, dressing to back dry, intact, no drainage. No nausea noted

## 2020-08-20 NOTE — ANESTHESIA PROCEDURE NOTES
Intubation  Performed by: Wilfrido Jacques CRNA  Authorized by: Dennis Andujar MD     Intubation:     Induction:  Intravenous    Intubated:  Postinduction    Mask Ventilation:  Easy mask    Attempts:  1    Attempted By:  CRNA    Method of Intubation:  Direct    Blade:  Thakkar 2    Laryngeal View Grade: Grade I - full view of chords      Difficult Airway Encountered?: No      Complications:  None    Airway Device:  Oral endotracheal tube    Airway Device Size:  7.0    Style/Cuff Inflation:  Cuffed    Inflation Amount (mL):  4    Tube secured:  21    Secured at:  The lips    Placement Verified By:  Capnometry    Complicating Factors:  None    Findings Post-Intubation:  BS equal bilateral

## 2020-08-20 NOTE — ASSESSMENT & PLAN NOTE
Pt with preoperative UTI.  Continue Ancef for now.  Can transition to culture appropriate antibiotic upon discharge.

## 2020-08-20 NOTE — HPI
Aysha Waters is a 19-year-old female PMHx significant for PCOS, HTN, obesity, and lumbar disc herniation S/P lumbar surgery 8/11 with Dr. Spain with complication of cerebral spinal fluid leak.  Pt reports HA, nausea, vomiting, and back pain upon discharge home with increased wound drainage.  She was evaluated in clinic by Dr. Spain who arranged for repeat wound exploration and dural repair.  Postoperatively, Pt reports back pain 5/10 in severity and well controlled with PCA.  She denies any nausea, vomiting, HA, chest pain, SOB, or abdominal complaints.  Incidentally, preoperative urinalysis reveals UTI.  Pt is on IV Abx postoperatively.  She was placed in outpatient recovery under the service of hospital Medicine for additional evaluation management.  Other pertinent medical Hx as below:

## 2020-08-20 NOTE — ANESTHESIA PREPROCEDURE EVALUATION
08/20/2020  Aysha Waters is a 19 y.o., female.    Pre-op Assessment    I have reviewed the Patient Summary Reports.     I have reviewed the Nursing Notes. I have reviewed the NPO Status.   I have reviewed the Medications.     Review of Systems  Anesthesia Hx:  No problems with previous Anesthesia    Social:  Non-Smoker    Cardiovascular:   Exercise tolerance: good Hypertension ECG has been reviewed.    Pulmonary:  Pulmonary Normal    Renal/:  Renal/ Normal     Hepatic/GI:  Hepatic/GI Normal    Musculoskeletal:  Spine Disorders:    Neurological:   Peripheral Neuropathy    Endocrine:  Endocrine Normal    Psych:   Psychiatric History depression          Physical Exam  General:  Morbid Obesity    Airway/Jaw/Neck:  Airway Findings: Mouth Opening: Normal Tongue: Normal  General Airway Assessment: Adult  Mallampati: II  Improves to I with phonation.  TM Distance: Normal, at least 6 cm       Chest/Lungs:  Chest/Lungs Clear    Heart/Vascular:  Heart Findings: Normal            Anesthesia Plan  Type of Anesthesia, risks & benefits discussed:  Anesthesia Type:  general  Patient's Preference:   Intra-op Monitoring Plan: standard ASA monitors  Intra-op Monitoring Plan Comments:   Post Op Pain Control Plan: multimodal analgesia and IV/PO Opioids PRN  Post Op Pain Control Plan Comments:   Induction:   IV  Beta Blocker:  Patient is not currently on a Beta-Blocker (No further documentation required).       Informed Consent: Patient understands risks and agrees with Anesthesia plan.  Questions answered. Anesthesia consent signed with patient.  ASA Score: 2     Day of Surgery Review of History & Physical: I have interviewed and examined the patient. I have reviewed the patient's H&P dated:  There are no significant changes.  H&P update referred to the surgeon.  H&P completed by Anesthesiologist.   Anesthesia Plan Notes:  I have personally evaluated the patient and discussed risk/benefits/alternatives of general anesthesia.        Ready For Surgery From Anesthesia Perspective.                                                                                                                08/20/2020  Aysha Waters is a 19 y.o., female.    Anesthesia Evaluation          Review of Systems  Anesthesia Hx:  No problems with previous Anesthesia   Social:  Non-Smoker    Cardiovascular:   Exercise tolerance: good Hypertension ECG has been reviewed.    Pulmonary:  Pulmonary Normal    Renal/:  Renal/ Normal     Hepatic/GI:  Hepatic/GI Normal    Musculoskeletal:  Spine Disorders:    Neurological:   Peripheral Neuropathy    Endocrine:  Endocrine Normal    Psych:   Psychiatric History depression          Physical Exam  General:  Morbid Obesity    Airway/Jaw/Neck:  Airway Findings: Mouth Opening: Normal Tongue: Normal  General Airway Assessment: Adult  Mallampati: II  Improves to I with phonation.  TM Distance: Normal, at least 6 cm       Chest/Lungs:  Chest/Lungs Clear    Heart/Vascular:  Heart Findings: Normal            Anesthesia Plan  Type of Anesthesia, risks & benefits discussed:  Anesthesia Type:  general  Patient's Preference:   Intra-op Monitoring Plan: standard ASA monitors  Intra-op Monitoring Plan Comments:   Post Op Pain Control Plan: multimodal analgesia and IV/PO Opioids PRN  Post Op Pain Control Plan Comments:   Induction:   IV  Beta Blocker:  Patient is not currently on a Beta-Blocker (No further documentation required).       Informed Consent: Patient understands risks and agrees with Anesthesia plan.  Questions answered. Anesthesia consent signed with patient.  ASA Score: 2     Day of Surgery Review of History & Physical:    H&P update referred to the surgeon.     Anesthesia Plan Notes: I have personally evaluated the patient and discussed risk/benefits/alternatives of general anesthesia.        Ready For Surgery From  Anesthesia Perspective.                                                                                                                  08/20/2020  Aysha Waters is a 19 y.o., female.    Anesthesia Evaluation          Review of Systems  Anesthesia Hx:  No problems with previous Anesthesia   Social:  Non-Smoker    Cardiovascular:   Exercise tolerance: good Hypertension ECG has been reviewed.    Pulmonary:  Pulmonary Normal    Renal/:  Renal/ Normal     Hepatic/GI:  Hepatic/GI Normal    Musculoskeletal:  Spine Disorders:    Neurological:   Peripheral Neuropathy    Endocrine:  Endocrine Normal    Psych:   Psychiatric History depression          Physical Exam  General:  Morbid Obesity    Airway/Jaw/Neck:  Airway Findings: Mouth Opening: Normal Tongue: Normal  General Airway Assessment: Adult  Mallampati: II  Improves to I with phonation.  TM Distance: Normal, at least 6 cm       Chest/Lungs:  Chest/Lungs Clear    Heart/Vascular:  Heart Findings: Normal            Anesthesia Plan  Type of Anesthesia, risks & benefits discussed:  Anesthesia Type:  general  Patient's Preference:   Intra-op Monitoring Plan: standard ASA monitors  Intra-op Monitoring Plan Comments:   Post Op Pain Control Plan: multimodal analgesia and IV/PO Opioids PRN  Post Op Pain Control Plan Comments:   Induction:   IV  Beta Blocker:  Patient is not currently on a Beta-Blocker (No further documentation required).       Informed Consent: Patient understands risks and agrees with Anesthesia plan.  Questions answered. Anesthesia consent signed with patient.  ASA Score: 2     Day of Surgery Review of History & Physical:    H&P update referred to the surgeon.     Anesthesia Plan Notes: I have personally evaluated the patient and discussed risk/benefits/alternatives of general anesthesia.        Ready For Surgery From Anesthesia Perspective.

## 2020-08-20 NOTE — ANESTHESIA POSTPROCEDURE EVALUATION
Anesthesia Post Evaluation    Patient: Aysha Waters    Procedure(s) Performed: Procedure(s) (LRB):  REPAIR, CSF LEAK, SPINE (N/A)    Final Anesthesia Type: general    Patient location during evaluation: PACU  Patient participation: Yes- Able to Participate  Level of consciousness: awake and alert  Post-procedure vital signs: reviewed and stable  Pain management: adequate  Airway patency: patent    PONV status at discharge: No PONV  Anesthetic complications: no      Cardiovascular status: hemodynamically stable  Respiratory status: unassisted and room air  Hydration status: euvolemic  Follow-up not needed.          Vitals Value Taken Time   /61 08/20/20 1310   Temp 36.7 °C (98.1 °F) 08/20/20 1310   Pulse 70 08/20/20 1440   Resp 18 08/20/20 1440   SpO2 97 % 08/20/20 1440         Event Time   Out of Recovery 13:05:00         Pain/Larry Score: Pain Rating Prior to Med Admin: 5 (8/20/2020  1:17 PM)  Pain Rating Post Med Admin: 6 (8/20/2020 12:20 PM)  Larry Score: 10 (8/20/2020 12:50 PM)

## 2020-08-20 NOTE — TRANSFER OF CARE
"Anesthesia Transfer of Care Note    Patient: Aysha Waters    Procedure(s) Performed: Procedure(s) (LRB):  REPAIR, CSF LEAK, SPINE (N/A)    Patient location: PACU    Anesthesia Type: general    Transport from OR: Transported from OR on 2-3 L/min O2 by NC with adequate spontaneous ventilation    Post pain: adequate analgesia    Post assessment: no apparent anesthetic complications    Post vital signs: stable    Level of consciousness: awake    Nausea/Vomiting: no nausea/vomiting    Complications: none    Transfer of care protocol was followed      Last vitals:   Visit Vitals  /74 (BP Location: Left arm, Patient Position: Lying)   Pulse 85   Temp 36.7 °C (98.1 °F) (Temporal)   Resp 18   Ht 5' 6" (1.676 m)   Wt 111.1 kg (245 lb)   LMP 08/10/2020 (Approximate)   SpO2 99%   Breastfeeding No   BMI 39.54 kg/m²     "

## 2020-08-20 NOTE — INTERVAL H&P NOTE
The patient has been examined and the H&P has been reviewed:    I concur with the findings and no changes have occurred since H&P was written.    Surgery risks, benefits and alternative options discussed and understood by patient/family.          Active Hospital Problems    Diagnosis  POA    CSF leak [G96.0]  Yes      Resolved Hospital Problems   No resolved problems to display.

## 2020-08-20 NOTE — ASSESSMENT & PLAN NOTE
Pt is S/P lumbar surgery with complication of cerebrospinal fluid leak with increasing symptoms requiring her wound exploration with dural repair.  Doing well postoperatively.  Pain control.

## 2020-08-20 NOTE — PLAN OF CARE
Pharmacy: [facility] in [place]  PCP: Jamila Smith  Insurance: Generic commercial [facility]       08/20/20 9534   Discharge Assessment   Assessment Type Discharge Planning Assessment   Confirmed/corrected address and phone number on facesheet? Yes   Assessment information obtained from? Patient   Expected Length of Stay (days) 3   Communicated expected length of stay with patient/caregiver yes   Prior to hospitilization cognitive status: Alert/Oriented   Prior to hospitalization functional status: Independent;Assistive Equipment   Current cognitive status: Alert/Oriented   Current Functional Status: Independent;Assistive Equipment   Facility Arrived From: home   Lives With parent(s)   Able to Return to Prior Arrangements yes   Is patient able to care for self after discharge? Yes   Who are your caregiver(s) and their phone number(s)? Maida Waters, mother [phone]   Patient's perception of discharge disposition home or selfcare   Readmission Within the Last 30 Days no previous admission in last 30 days   Patient currently being followed by outpatient case management? No   Patient currently receives any other outside agency services? No   Equipment Currently Used at Home walker, rolling   Do you have any problems affording any of your prescribed medications? No   Is the patient taking medications as prescribed? yes   Does the patient have transportation home? Yes   Transportation Anticipated family or friend will provide   Dialysis Name and Scheduled days na   Does the patient receive services at the Coumadin Clinic? No   Discharge Plan A Home   Discharge Plan B Home   DME Needed Upon Discharge  none   Patient/Family in Agreement with Plan yes

## 2020-08-20 NOTE — PLAN OF CARE
VINITA sent patient information to [facility] via [facility] for resumption of care.       08/20/20 0522   Post-Acute Status   Post-Acute Authorization Home Health   Home Health Status Referrals Sent   Patient choice form signed by patient/caregiver List with quality metrics by geographic area provided

## 2020-08-20 NOTE — ASSESSMENT & PLAN NOTE
POD 0 s/p Lumbar wound exploration with repair durotomy L4 level right with Dr. Spain.  Continue strict bedrest-lay flat until 8/21 at 11:00 a.m.  Follow surgeon's orders.  Pain control-currently receiving PCA.

## 2020-08-21 LAB
ANION GAP SERPL CALC-SCNC: 7 MMOL/L (ref 8–16)
BASOPHILS # BLD AUTO: 0.05 K/UL (ref 0–0.2)
BASOPHILS NFR BLD: 0.5 % (ref 0–1.9)
BUN SERPL-MCNC: 7 MG/DL (ref 6–20)
CALCIUM SERPL-MCNC: 8.6 MG/DL (ref 8.7–10.5)
CHLORIDE SERPL-SCNC: 106 MMOL/L (ref 95–110)
CO2 SERPL-SCNC: 25 MMOL/L (ref 23–29)
CREAT SERPL-MCNC: 0.7 MG/DL (ref 0.5–1.4)
DIFFERENTIAL METHOD: ABNORMAL
EOSINOPHIL # BLD AUTO: 0.1 K/UL (ref 0–0.5)
EOSINOPHIL NFR BLD: 1.3 % (ref 0–8)
ERYTHROCYTE [DISTWIDTH] IN BLOOD BY AUTOMATED COUNT: 13.4 % (ref 11.5–14.5)
EST. GFR  (AFRICAN AMERICAN): >60 ML/MIN/1.73 M^2
EST. GFR  (NON AFRICAN AMERICAN): >60 ML/MIN/1.73 M^2
GLUCOSE SERPL-MCNC: 89 MG/DL (ref 70–110)
HCT VFR BLD AUTO: 29.3 % (ref 37–48.5)
HGB BLD-MCNC: 9 G/DL (ref 12–16)
IMM GRANULOCYTES # BLD AUTO: 0.12 K/UL (ref 0–0.04)
IMM GRANULOCYTES NFR BLD AUTO: 1.1 % (ref 0–0.5)
LYMPHOCYTES # BLD AUTO: 3.8 K/UL (ref 1–4.8)
LYMPHOCYTES NFR BLD: 34.6 % (ref 18–48)
MCH RBC QN AUTO: 25.4 PG (ref 27–31)
MCHC RBC AUTO-ENTMCNC: 30.7 G/DL (ref 32–36)
MCV RBC AUTO: 83 FL (ref 82–98)
MONOCYTES # BLD AUTO: 0.5 K/UL (ref 0.3–1)
MONOCYTES NFR BLD: 4.8 % (ref 4–15)
NEUTROPHILS # BLD AUTO: 6.3 K/UL (ref 1.8–7.7)
NEUTROPHILS NFR BLD: 57.7 % (ref 38–73)
NRBC BLD-RTO: 0 /100 WBC
PLATELET # BLD AUTO: 352 K/UL (ref 150–350)
PMV BLD AUTO: 9.4 FL (ref 9.2–12.9)
POTASSIUM SERPL-SCNC: 3.7 MMOL/L (ref 3.5–5.1)
RBC # BLD AUTO: 3.55 M/UL (ref 4–5.4)
SODIUM SERPL-SCNC: 138 MMOL/L (ref 136–145)
WBC # BLD AUTO: 10.86 K/UL (ref 3.9–12.7)

## 2020-08-21 PROCEDURE — 85025 COMPLETE CBC W/AUTO DIFF WBC: CPT

## 2020-08-21 PROCEDURE — 80048 BASIC METABOLIC PNL TOTAL CA: CPT

## 2020-08-21 PROCEDURE — 94799 UNLISTED PULMONARY SVC/PX: CPT

## 2020-08-21 PROCEDURE — 63600175 PHARM REV CODE 636 W HCPCS: Performed by: PHYSICIAN ASSISTANT

## 2020-08-21 PROCEDURE — 63600175 PHARM REV CODE 636 W HCPCS: Performed by: NURSE PRACTITIONER

## 2020-08-21 PROCEDURE — 36415 COLL VENOUS BLD VENIPUNCTURE: CPT

## 2020-08-21 PROCEDURE — 63600175 PHARM REV CODE 636 W HCPCS: Performed by: ANESTHESIOLOGY

## 2020-08-21 PROCEDURE — 12000002 HC ACUTE/MED SURGE SEMI-PRIVATE ROOM

## 2020-08-21 PROCEDURE — 94761 N-INVAS EAR/PLS OXIMETRY MLT: CPT

## 2020-08-21 PROCEDURE — 94770 HC EXHALED C02 TEST: CPT

## 2020-08-21 PROCEDURE — 99900035 HC TECH TIME PER 15 MIN (STAT)

## 2020-08-21 PROCEDURE — 25000003 PHARM REV CODE 250: Performed by: PHYSICIAN ASSISTANT

## 2020-08-21 RX ADMIN — FLUOXETINE HYDROCHLORIDE 20 MG: 20 CAPSULE ORAL at 09:08

## 2020-08-21 RX ADMIN — METFORMIN HYDROCHLORIDE 1000 MG: 500 TABLET, FILM COATED ORAL at 09:08

## 2020-08-21 RX ADMIN — ACETAMINOPHEN 1000 MG: 10 INJECTION, SOLUTION INTRAVENOUS at 10:08

## 2020-08-21 RX ADMIN — CEFTRIAXONE 1 G: 1 INJECTION, SOLUTION INTRAVENOUS at 11:08

## 2020-08-21 RX ADMIN — OXYCODONE HYDROCHLORIDE 10 MG: 10 TABLET ORAL at 01:08

## 2020-08-21 RX ADMIN — OXYCODONE HYDROCHLORIDE 15 MG: 10 TABLET ORAL at 07:08

## 2020-08-21 RX ADMIN — MUPIROCIN 1 G: 20 OINTMENT TOPICAL at 09:08

## 2020-08-21 RX ADMIN — METHOCARBAMOL TABLETS 750 MG: 750 TABLET, COATED ORAL at 03:08

## 2020-08-21 RX ADMIN — CEFAZOLIN SODIUM 2 G: 2 SOLUTION INTRAVENOUS at 04:08

## 2020-08-21 RX ADMIN — DOCUSATE SODIUM 100 MG: 100 CAPSULE, LIQUID FILLED ORAL at 09:08

## 2020-08-21 RX ADMIN — SODIUM CHLORIDE, SODIUM LACTATE, POTASSIUM CHLORIDE, AND CALCIUM CHLORIDE 125 ML/HR: .6; .31; .03; .02 INJECTION, SOLUTION INTRAVENOUS at 04:08

## 2020-08-21 RX ADMIN — HYDROMORPHONE HYDROCHLORIDE 2 MG: 2 INJECTION INTRAMUSCULAR; INTRAVENOUS; SUBCUTANEOUS at 09:08

## 2020-08-21 RX ADMIN — METFORMIN HYDROCHLORIDE 1000 MG: 500 TABLET, FILM COATED ORAL at 05:08

## 2020-08-21 RX ADMIN — ACETAMINOPHEN 1000 MG: 10 INJECTION, SOLUTION INTRAVENOUS at 12:08

## 2020-08-21 RX ADMIN — GABAPENTIN 300 MG: 300 CAPSULE ORAL at 09:08

## 2020-08-21 RX ADMIN — Medication: at 08:08

## 2020-08-21 RX ADMIN — METHOCARBAMOL TABLETS 750 MG: 750 TABLET, COATED ORAL at 09:08

## 2020-08-21 NOTE — ASSESSMENT & PLAN NOTE
POD 1 s/p Lumbar wound exploration with repair durotomy L4 level right with Dr. Spain.  Continue strict bedrest-lay flat until 8/21 at 11:00 a.m.  Follow surgeon's orders.  Pain control-currently receiving PCA.

## 2020-08-21 NOTE — SUBJECTIVE & OBJECTIVE
"Principal Problem:CSF leak    Principal Orthopedic Problem: S/P CSF leak repair    Interval History: none    Review of patient's allergies indicates:  No Known Allergies    Current Facility-Administered Medications   Medication    acetaminophen 1,000 mg/100 mL (10 mg/mL) injection 1,000 mg    acetaminophen tablet 650 mg    aluminum-magnesium hydroxide-simethicone 200-200-20 mg/5 mL suspension 30 mL    bisacodyL suppository 10 mg    cefTRIAXone (ROCEPHIN) 1 g/50 mL D5W IVPB    diphenhydrAMINE capsule 50 mg    docusate sodium capsule 100 mg    FLUoxetine capsule 20 mg    gabapentin capsule 300 mg    hydroCHLOROthiazide tablet 12.5 mg    hydromorphone (PF) injection 2 mg    HYDROmorphone PCA syringe 6 mg/30 mL (0.2 mg/mL) NS    lactated ringers infusion    lactated ringers infusion    lisinopriL tablet 40 mg    metFORMIN tablet 1,000 mg    methocarbamoL tablet 750 mg    mupirocin 2 % ointment 1 g    naloxone 0.4 mg/mL injection 0.02 mg    ondansetron disintegrating tablet 8 mg    oxyCODONE immediate release tablet 15 mg    oxyCODONE immediate release tablet 5 mg    oxyCODONE immediate release tablet Tab 10 mg    promethazine (PHENERGAN) 12.5 mg in dextrose 5 % 50 mL IVPB    senna-docusate 8.6-50 mg per tablet 2 tablet     Objective:     Vital Signs (Most Recent):  Temp: 97.8 °F (36.6 °C) (08/21/20 0353)  Pulse: 71 (08/21/20 0353)  Resp: 18 (08/21/20 0353)  BP: 124/66 (08/21/20 0353)  SpO2: 98 % (08/21/20 0742) Vital Signs (24h Range):  Temp:  [97.3 °F (36.3 °C)-98.1 °F (36.7 °C)] 97.8 °F (36.6 °C)  Pulse:  [60-85] 71  Resp:  [0-21] 18  SpO2:  [93 %-100 %] 98 %  BP: (113-134)/(58-75) 124/66     Weight: 111.1 kg (245 lb)  Height: 5' 6" (167.6 cm)  Body mass index is 39.54 kg/m².      Intake/Output Summary (Last 24 hours) at 8/21/2020 0806  Last data filed at 8/21/2020 0440  Gross per 24 hour   Intake 3290 ml   Output 2155 ml   Net 1135 ml       General    Nursing note and vitals " reviewed.  Constitutional: She is oriented to person, place, and time.   Morbidly obese   Pulmonary/Chest: Effort normal.   Neurological: She is alert and oriented to person, place, and time.   Psychiatric: She has a normal mood and affect. Her behavior is normal.         Back (L-Spine & T-Spine) / Neck (C-Spine) Exam     Comments:  Dressing C/D/I. Barron in place. Supine.         Significant Labs:   CBC:   Recent Labs   Lab 08/20/20  1144 08/21/20  0500   WBC 8.83 10.86   HGB 8.8* 9.0*   HCT 29.2* 29.3*    352*     CMP:   Recent Labs   Lab 08/20/20  1144 08/21/20  0500    138   K 4.0 3.7    106   CO2 19* 25   * 89   BUN 8 7   CREATININE 0.7 0.7   CALCIUM 8.6* 8.6*   ANIONGAP 15 7*   EGFRNONAA >60 >60     All pertinent labs within the past 24 hours have been reviewed.    Significant Imaging: None

## 2020-08-21 NOTE — HPI
POD #2 S/P CSF leak repair  - Doing well. Denies HA/ N/ V even with sitting up. \  - Hasn't ambulated yet and kerr is still in  - pain is tolerable  - C/O epigastric burning; denies PMH GERD

## 2020-08-21 NOTE — PROGRESS NOTES
[facility]  Orthopedics  Progress Note    Patient Name: Aysha Waters  MRN: [id number]  Admission Date: 8/20/2020  Hospital Length of Stay: 1 days  Attending Provider: Zander Rowland MD  Primary Care Provider: Jamila Smith MD  Follow-up For: Procedure(s) (LRB):  REPAIR, CSF LEAK, SPINE (N/A)    Post-Operative Day: 1 Day Post-Op  Subjective:     Principal Problem:CSF leak    Principal Orthopedic Problem: S/P CSF leak repair    Interval History: none    Review of patient's allergies indicates:  No Known Allergies    Current Facility-Administered Medications   Medication    acetaminophen 1,000 mg/100 mL (10 mg/mL) injection 1,000 mg    acetaminophen tablet 650 mg    aluminum-magnesium hydroxide-simethicone 200-200-20 mg/5 mL suspension 30 mL    bisacodyL suppository 10 mg    cefTRIAXone (ROCEPHIN) 1 g/50 mL D5W IVPB    diphenhydrAMINE capsule 50 mg    docusate sodium capsule 100 mg    FLUoxetine capsule 20 mg    gabapentin capsule 300 mg    hydroCHLOROthiazide tablet 12.5 mg    hydromorphone (PF) injection 2 mg    HYDROmorphone PCA syringe 6 mg/30 mL (0.2 mg/mL) NS    lactated ringers infusion    lactated ringers infusion    lisinopriL tablet 40 mg    metFORMIN tablet 1,000 mg    methocarbamoL tablet 750 mg    mupirocin 2 % ointment 1 g    naloxone 0.4 mg/mL injection 0.02 mg    ondansetron disintegrating tablet 8 mg    oxyCODONE immediate release tablet 15 mg    oxyCODONE immediate release tablet 5 mg    oxyCODONE immediate release tablet Tab 10 mg    promethazine (PHENERGAN) 12.5 mg in dextrose 5 % 50 mL IVPB    senna-docusate 8.6-50 mg per tablet 2 tablet     Objective:     Vital Signs (Most Recent):  Temp: 97.8 °F (36.6 °C) (08/21/20 0353)  Pulse: 71 (08/21/20 0353)  Resp: 18 (08/21/20 0353)  BP: 124/66 (08/21/20 0353)  SpO2: 98 % (08/21/20 0742) Vital Signs (24h Range):  Temp:  [97.3 °F (36.3 °C)-98.1 °F (36.7 °C)] 97.8 °F (36.6 °C)  Pulse:  [60-85]  "71  Resp:  [0-21] 18  SpO2:  [93 %-100 %] 98 %  BP: (113-134)/(58-75) 124/66     Weight: 111.1 kg (245 lb)  Height: 5' 6" (167.6 cm)  Body mass index is 39.54 kg/m².      Intake/Output Summary (Last 24 hours) at 8/21/2020 0806  Last data filed at 8/21/2020 0440  Gross per 24 hour   Intake 3290 ml   Output 2155 ml   Net 1135 ml       General    Nursing note and vitals reviewed.  Constitutional: She is oriented to person, place, and time.   Morbidly obese   Pulmonary/Chest: Effort normal.   Neurological: She is alert and oriented to person, place, and time.   Psychiatric: She has a normal mood and affect. Her behavior is normal.         Back (L-Spine & T-Spine) / Neck (C-Spine) Exam     Comments:  Dressing C/D/I. Kerr in place. Supine.         Significant Labs:   CBC:   Recent Labs   Lab 08/20/20  1144 08/21/20  0500   WBC 8.83 10.86   HGB 8.8* 9.0*   HCT 29.2* 29.3*    352*     CMP:   Recent Labs   Lab 08/20/20  1144 08/21/20  0500    138   K 4.0 3.7    106   CO2 19* 25   * 89   BUN 8 7   CREATININE 0.7 0.7   CALCIUM 8.6* 8.6*   ANIONGAP 15 7*   EGFRNONAA >60 >60     All pertinent labs within the past 24 hours have been reviewed.    Significant Imaging: None    Assessment/Plan:     * CSF leak  Graduated increase in HOB as ordered if no HA/ N/ V  DC PCA as ordered  DC kerr after tolerating OOB activity  No PT until tolerating OOB activity  Pt instructed to avoid straining          OPAL BUNCH  Orthopedics  [facility]  "

## 2020-08-21 NOTE — PLAN OF CARE
08/20/20 1920   Patient Assessment/Suction   Level of Consciousness (AVPU) alert   Respiratory Effort Normal;Unlabored   Expansion/Accessory Muscles/Retractions no use of accessory muscles   PRE-TX-O2   O2 Device (Oxygen Therapy) room air   SpO2 95 %   Pulse Oximetry Type Intermittent   $ Pulse Oximetry - Multiple Charge Pulse Oximetry - Multiple   Pulse 76   Resp 18   ETCO2   $ ETCO2 Charge Exhaled CO2 Monitoring   $ ETCO2 Usage Currently wearing   ETCO2 (mmHg) 40 mmHg   ETCO2 Device Type Bedside Monitor;Nasal Cannula   Incentive Spirometer   $ Incentive Spirometer Charges done with encouragement   Administration (IS) instruction provided, follow-up   Number of Repetitions (IS) 10   Level Incentive Spirometer (mL) 1800   Patient Tolerance (IS) good

## 2020-08-21 NOTE — PROGRESS NOTES
"[facility] Hospital Medicine  Progress Note    Patient Name: Aysha Waters  MRN: [id number]  Patient Class: IP- Inpatient   Admission Date: 8/20/2020  Length of Stay: 1 days  Attending Physician: Zander Rowland MD  Primary Care Provider: Jamila Smith MD        Subjective:     Principal Problem:CSF leak        HPI:  Aysha Waters is a 19-year-old female PMHx significant for PCOS, HTN, obesity, and lumbar disc herniation S/P lumbar surgery 8/11 with Dr. Spain with complication of cerebral spinal fluid leak.  Pt reports HA, nausea, vomiting, and back pain upon discharge home with increased wound drainage.  She was evaluated in clinic by Dr. Spain who arranged for repeat wound exploration and dural repair.  Postoperatively, Pt reports back pain 5/10 in severity and well controlled with PCA.  She denies any nausea, vomiting, HA, chest pain, SOB, or abdominal complaints.  Incidentally, preoperative urinalysis reveals UTI.  Pt is on IV Abx postoperatively.  She was placed in outpatient recovery under the service of hospital Medicine for additional evaluation management.  Other pertinent medical Hx as below:    Overview/Hospital Course:  No notes on file    Interval History:  Seen and examined.  Pt had mild HA this morning that has since resolved.  No nausea vomiting.  Mild epigastric discomfort.  Reports back pain 4/10 in severity-described as soreness, improved with pain medication.  Still on bedrest.    Review of Systems   Constitutional: Negative for chills, fatigue and fever.   Respiratory: Negative for cough and shortness of breath.    Gastrointestinal: Negative for nausea and vomiting.        Epigastric "discomfort"   Genitourinary:        Barron   Musculoskeletal: Positive for back pain.   Neurological: Positive for headaches (resolved). Negative for weakness and numbness.   Psychiatric/Behavioral: Negative for confusion. The patient is not nervous/anxious.      Objective:     Vital " Signs (Most Recent):  Temp: 98.2 °F (36.8 °C) (08/21/20 0817)  Pulse: 76 (08/21/20 0817)  Resp: 16 (08/21/20 0847)  BP: 127/67 (08/21/20 0817)  SpO2: 95 % (08/21/20 0817) Vital Signs (24h Range):  Temp:  [97.3 °F (36.3 °C)-98.2 °F (36.8 °C)] 98.2 °F (36.8 °C)  Pulse:  [60-82] 76  Resp:  [10-21] 16  SpO2:  [93 %-100 %] 95 %  BP: (113-131)/(58-75) 127/67     Weight: 111.1 kg (245 lb)  Body mass index is 39.54 kg/m².    Intake/Output Summary (Last 24 hours) at 8/21/2020 1110  Last data filed at 8/21/2020 0440  Gross per 24 hour   Intake 2290 ml   Output 1825 ml   Net 465 ml      Physical Exam  Vitals signs and nursing note reviewed.   Constitutional:       Appearance: Normal appearance. She is obese.   HENT:      Head: Normocephalic and atraumatic.   Eyes:      Extraocular Movements: Extraocular movements intact.      Conjunctiva/sclera: Conjunctivae normal.      Pupils: Pupils are equal, round, and reactive to light.   Neck:      Musculoskeletal: Normal range of motion and neck supple.   Cardiovascular:      Rate and Rhythm: Normal rate and regular rhythm.      Pulses: Normal pulses.      Heart sounds: Normal heart sounds.   Pulmonary:      Effort: Pulmonary effort is normal. No respiratory distress.      Breath sounds: Normal breath sounds.   Abdominal:      General: Bowel sounds are normal. There is no distension.      Palpations: Abdomen is soft.   Musculoskeletal: Normal range of motion.   Skin:     General: Skin is warm and dry.      Capillary Refill: Capillary refill takes less than 2 seconds.   Neurological:      General: No focal deficit present.      Mental Status: She is alert and oriented to person, place, and time. Mental status is at baseline.   Psychiatric:         Mood and Affect: Mood normal.         Behavior: Behavior normal.         Significant Labs:   CBC:   Recent Labs   Lab 08/20/20  1144 08/21/20  0500   WBC 8.83 10.86   HGB 8.8* 9.0*   HCT 29.2* 29.3*    352*     CMP:   Recent Labs   Lab  08/20/20  1144 08/21/20  0500    138   K 4.0 3.7    106   CO2 19* 25   * 89   BUN 8 7   CREATININE 0.7 0.7   CALCIUM 8.6* 8.6*   ANIONGAP 15 7*   EGFRNONAA >60 >60             Assessment/Plan:      * CSF leak  POD 1 s/p Lumbar wound exploration with repair durotomy L4 level right with Dr. Spain.  Continue strict bedrest-lay flat until 8/21 at 11:00 a.m.  Follow surgeon's orders.  Pain control-currently receiving PCA.      Ruptured lumbar intervertebral disc  Pt is S/P lumbar surgery with complication of cerebrospinal fluid leak with increasing symptoms requiring her wound exploration with dural repair.  Doing well postoperatively.  Pain control.        Acute cystitis without hematuria  Pt with preoperative UTI.  Continue Ancef for now.  Can transition to culture appropriate antibiotic upon discharge.      Essential hypertension  Chronic, controlled.  Latest blood pressure and vitals reviewed-   Temp:  [97.3 °F (36.3 °C)-98.2 °F (36.8 °C)]   Pulse:  [60-82]   Resp:  [10-21]   BP: (113-131)/(58-75)   SpO2:  [93 %-100 %] .   Home meds for hypertension were reviewed and noted below. Hospital anti-hypertensive changes were made as shown below.  Hypertension Medications             hydroCHLOROthiazide (HYDRODIURIL) 12.5 MG Tab Take 1 tablet (12.5 mg total) by mouth once daily.    lisinopril (PRINIVIL,ZESTRIL) 40 MG tablet Take 1 tablet (40 mg total) by mouth once daily.        Will utilize p.r.n. blood pressure medication only if patient's blood pressure greater than  180/110 and she develops symptoms such as worsening chest pain or shortness of breath.    Pt reported hypotension last admission.  She is requesting that we hold her BP medications postoperatively.  Will hold monitor BP closely.    Obesity  Body mass index is 39.54 kg/m².  Obesity compounds patient co-morbidities and complicates treatment course.  Counseling given regarding diet modification and exercise recommendations.        VTE Risk  Mitigation (From admission, onward)         Ordered     IP VTE LOW RISK PATIENT  Once      08/20/20 0739     Place sequential compression device  Until discontinued      08/20/20 0739     Place CARIDAD hose  Until discontinued      08/20/20 0739                Discharge Planning   JOSE GUADALUPE:  8/22/20    Code Status: Prior   Is the patient medically ready for discharge?:     Reason for patient still in hospital (select all that apply): Patient trending condition, Consult recommendations and PT / OT recommendations  Discharge Plan A: Home                  Jamila Hoover NP  Department of Hospital Medicine   [facility]

## 2020-08-21 NOTE — ASSESSMENT & PLAN NOTE
Chronic, controlled.  Latest blood pressure and vitals reviewed-   Temp:  [97.3 °F (36.3 °C)-98.2 °F (36.8 °C)]   Pulse:  [60-82]   Resp:  [10-21]   BP: (113-131)/(58-75)   SpO2:  [93 %-100 %] .   Home meds for hypertension were reviewed and noted below. Hospital anti-hypertensive changes were made as shown below.  Hypertension Medications             hydroCHLOROthiazide (HYDRODIURIL) 12.5 MG Tab Take 1 tablet (12.5 mg total) by mouth once daily.    lisinopril (PRINIVIL,ZESTRIL) 40 MG tablet Take 1 tablet (40 mg total) by mouth once daily.        Will utilize p.r.n. blood pressure medication only if patient's blood pressure greater than  180/110 and she develops symptoms such as worsening chest pain or shortness of breath.    Pt reported hypotension last admission.  She is requesting that we hold her BP medications postoperatively.  Will hold monitor BP closely.

## 2020-08-21 NOTE — PLAN OF CARE
08/21/20 0742   Patient Assessment/Suction   Respiratory Effort Unlabored   Expansion/Accessory Muscles/Retractions expansion symmetric;no retractions   All Lung Fields Breath Sounds Anterior:;Lateral:;clear   Rhythm/Pattern, Respiratory unlabored   PRE-TX-O2   O2 Device (Oxygen Therapy) room air   SpO2 98 %   Pulse Oximetry Type Intermittent   $ Pulse Oximetry - Multiple Charge Pulse Oximetry - Multiple   ETCO2   $ ETCO2 Charge Exhaled CO2 Monitoring   $ ETCO2 Usage Currently wearing   ETCO2 (mmHg) 42 mmHg   ETCO2 Device Type Bedside Monitor   Incentive Spirometer   $ Incentive Spirometer Charges done with encouragement   Incentive Spirometer Predicted Level (mL) 2700   Administration (IS) proper technique demonstrated   Number of Repetitions (IS) 10   Level Incentive Spirometer (mL) 1800   Patient Tolerance (IS) good

## 2020-08-21 NOTE — SUBJECTIVE & OBJECTIVE
"Interval History:  Seen and examined.  Pt had mild HA this morning that has since resolved.  No nausea vomiting.  Mild epigastric discomfort.  Reports back pain 4/10 in severity-described as soreness, improved with pain medication.  Still on bedrest.    Review of Systems   Constitutional: Negative for chills, fatigue and fever.   Respiratory: Negative for cough and shortness of breath.    Gastrointestinal: Negative for nausea and vomiting.        Epigastric "discomfort"   Genitourinary:        Barron   Musculoskeletal: Positive for back pain.   Neurological: Positive for headaches (resolved). Negative for weakness and numbness.   Psychiatric/Behavioral: Negative for confusion. The patient is not nervous/anxious.      Objective:     Vital Signs (Most Recent):  Temp: 98.2 °F (36.8 °C) (08/21/20 0817)  Pulse: 76 (08/21/20 0817)  Resp: 16 (08/21/20 0847)  BP: 127/67 (08/21/20 0817)  SpO2: 95 % (08/21/20 0817) Vital Signs (24h Range):  Temp:  [97.3 °F (36.3 °C)-98.2 °F (36.8 °C)] 98.2 °F (36.8 °C)  Pulse:  [60-82] 76  Resp:  [10-21] 16  SpO2:  [93 %-100 %] 95 %  BP: (113-131)/(58-75) 127/67     Weight: 111.1 kg (245 lb)  Body mass index is 39.54 kg/m².    Intake/Output Summary (Last 24 hours) at 8/21/2020 1110  Last data filed at 8/21/2020 0440  Gross per 24 hour   Intake 2290 ml   Output 1825 ml   Net 465 ml      Physical Exam  Vitals signs and nursing note reviewed.   Constitutional:       Appearance: Normal appearance. She is obese.   HENT:      Head: Normocephalic and atraumatic.   Eyes:      Extraocular Movements: Extraocular movements intact.      Conjunctiva/sclera: Conjunctivae normal.      Pupils: Pupils are equal, round, and reactive to light.   Neck:      Musculoskeletal: Normal range of motion and neck supple.   Cardiovascular:      Rate and Rhythm: Normal rate and regular rhythm.      Pulses: Normal pulses.      Heart sounds: Normal heart sounds.   Pulmonary:      Effort: Pulmonary effort is normal. No " respiratory distress.      Breath sounds: Normal breath sounds.   Abdominal:      General: Bowel sounds are normal. There is no distension.      Palpations: Abdomen is soft.   Musculoskeletal: Normal range of motion.   Skin:     General: Skin is warm and dry.      Capillary Refill: Capillary refill takes less than 2 seconds.   Neurological:      General: No focal deficit present.      Mental Status: She is alert and oriented to person, place, and time. Mental status is at baseline.   Psychiatric:         Mood and Affect: Mood normal.         Behavior: Behavior normal.         Significant Labs:   CBC:   Recent Labs   Lab 08/20/20  1144 08/21/20  0500   WBC 8.83 10.86   HGB 8.8* 9.0*   HCT 29.2* 29.3*    352*     CMP:   Recent Labs   Lab 08/20/20  1144 08/21/20  0500    138   K 4.0 3.7    106   CO2 19* 25   * 89   BUN 8 7   CREATININE 0.7 0.7   CALCIUM 8.6* 8.6*   ANIONGAP 15 7*   EGFRNONAA >60 >60

## 2020-08-21 NOTE — PLAN OF CARE
POC reviewed with patient; pt verbalized understanding. Pt AAO x 4. Pt supine per order; bedrest maintained. Barron in place draining clear yellow urine. Room air. PCA pump in use. PIV cdi. 2nd PIV inserted to right hand 22G.  Patient tolerated well.  IVF infusing per order. Abx administered. SCDs/CARIDAD hose in place. O2 sensor in place.  Surgical dressing cdi. Call bell in reach, bed locked, bed alarm on, and fall band and no skid socks on. Will continue to monitor

## 2020-08-21 NOTE — ASSESSMENT & PLAN NOTE
Graduated increase in HOB as ordered if no HA/ N/ V  DC PCA as ordered  DC kerr after tolerating OOB activity  No PT until tolerating OOB activity  Pt instructed to avoid straining

## 2020-08-22 VITALS
BODY MASS INDEX: 39.37 KG/M2 | HEART RATE: 77 BPM | HEIGHT: 66 IN | DIASTOLIC BLOOD PRESSURE: 59 MMHG | TEMPERATURE: 98 F | RESPIRATION RATE: 16 BRPM | SYSTOLIC BLOOD PRESSURE: 113 MMHG | OXYGEN SATURATION: 98 % | WEIGHT: 245 LBS

## 2020-08-22 LAB
ANION GAP SERPL CALC-SCNC: 11 MMOL/L (ref 8–16)
BACTERIA UR CULT: ABNORMAL
BASOPHILS # BLD AUTO: 0.04 K/UL (ref 0–0.2)
BASOPHILS NFR BLD: 0.4 % (ref 0–1.9)
BUN SERPL-MCNC: 6 MG/DL (ref 6–20)
CALCIUM SERPL-MCNC: 9.1 MG/DL (ref 8.7–10.5)
CHLORIDE SERPL-SCNC: 103 MMOL/L (ref 95–110)
CO2 SERPL-SCNC: 25 MMOL/L (ref 23–29)
CREAT SERPL-MCNC: 0.7 MG/DL (ref 0.5–1.4)
DIFFERENTIAL METHOD: ABNORMAL
EOSINOPHIL # BLD AUTO: 0.3 K/UL (ref 0–0.5)
EOSINOPHIL NFR BLD: 3 % (ref 0–8)
ERYTHROCYTE [DISTWIDTH] IN BLOOD BY AUTOMATED COUNT: 13.6 % (ref 11.5–14.5)
EST. GFR  (AFRICAN AMERICAN): >60 ML/MIN/1.73 M^2
EST. GFR  (NON AFRICAN AMERICAN): >60 ML/MIN/1.73 M^2
GLUCOSE SERPL-MCNC: 93 MG/DL (ref 70–110)
HCT VFR BLD AUTO: 29.9 % (ref 37–48.5)
HGB BLD-MCNC: 9.3 G/DL (ref 12–16)
IMM GRANULOCYTES # BLD AUTO: 0.15 K/UL (ref 0–0.04)
IMM GRANULOCYTES NFR BLD AUTO: 1.6 % (ref 0–0.5)
LYMPHOCYTES # BLD AUTO: 3 K/UL (ref 1–4.8)
LYMPHOCYTES NFR BLD: 30.8 % (ref 18–48)
MCH RBC QN AUTO: 26 PG (ref 27–31)
MCHC RBC AUTO-ENTMCNC: 31.1 G/DL (ref 32–36)
MCV RBC AUTO: 84 FL (ref 82–98)
MONOCYTES # BLD AUTO: 0.6 K/UL (ref 0.3–1)
MONOCYTES NFR BLD: 6.4 % (ref 4–15)
NEUTROPHILS # BLD AUTO: 5.6 K/UL (ref 1.8–7.7)
NEUTROPHILS NFR BLD: 57.8 % (ref 38–73)
NRBC BLD-RTO: 0 /100 WBC
PLATELET # BLD AUTO: 332 K/UL (ref 150–350)
PMV BLD AUTO: 9.3 FL (ref 9.2–12.9)
POTASSIUM SERPL-SCNC: 3.9 MMOL/L (ref 3.5–5.1)
RBC # BLD AUTO: 3.58 M/UL (ref 4–5.4)
SODIUM SERPL-SCNC: 139 MMOL/L (ref 136–145)
WBC # BLD AUTO: 9.62 K/UL (ref 3.9–12.7)

## 2020-08-22 PROCEDURE — 97164 PT RE-EVAL EST PLAN CARE: CPT

## 2020-08-22 PROCEDURE — 25000003 PHARM REV CODE 250: Performed by: PHYSICIAN ASSISTANT

## 2020-08-22 PROCEDURE — 63600175 PHARM REV CODE 636 W HCPCS: Performed by: PHYSICIAN ASSISTANT

## 2020-08-22 PROCEDURE — 94799 UNLISTED PULMONARY SVC/PX: CPT

## 2020-08-22 PROCEDURE — 97161 PT EVAL LOW COMPLEX 20 MIN: CPT

## 2020-08-22 PROCEDURE — 94761 N-INVAS EAR/PLS OXIMETRY MLT: CPT

## 2020-08-22 PROCEDURE — 80048 BASIC METABOLIC PNL TOTAL CA: CPT

## 2020-08-22 PROCEDURE — C9113 INJ PANTOPRAZOLE SODIUM, VIA: HCPCS | Performed by: PHYSICIAN ASSISTANT

## 2020-08-22 PROCEDURE — 36415 COLL VENOUS BLD VENIPUNCTURE: CPT

## 2020-08-22 PROCEDURE — 63600175 PHARM REV CODE 636 W HCPCS: Performed by: NURSE PRACTITIONER

## 2020-08-22 PROCEDURE — 85025 COMPLETE CBC W/AUTO DIFF WBC: CPT

## 2020-08-22 RX ORDER — CEPHALEXIN 500 MG/1
500 CAPSULE ORAL EVERY 12 HOURS
Qty: 10 CAPSULE | Refills: 0 | Status: SHIPPED | OUTPATIENT
Start: 2020-08-22 | End: 2020-09-04

## 2020-08-22 RX ORDER — MAG HYDROX/ALUMINUM HYD/SIMETH 200-200-20
30 SUSPENSION, ORAL (FINAL DOSE FORM) ORAL EVERY 4 HOURS PRN
COMMUNITY
Start: 2020-08-22 | End: 2020-10-12

## 2020-08-22 RX ORDER — PANTOPRAZOLE SODIUM 40 MG/1
40 TABLET, DELAYED RELEASE ORAL 2 TIMES DAILY
Qty: 20 TABLET | Refills: 0 | Status: SHIPPED | OUTPATIENT
Start: 2020-08-22 | End: 2021-06-23

## 2020-08-22 RX ORDER — PANTOPRAZOLE SODIUM 40 MG/10ML
40 INJECTION, POWDER, LYOPHILIZED, FOR SOLUTION INTRAVENOUS DAILY
Status: DISCONTINUED | OUTPATIENT
Start: 2020-08-22 | End: 2020-08-22 | Stop reason: HOSPADM

## 2020-08-22 RX ORDER — DOCUSATE SODIUM 100 MG/1
100 CAPSULE, LIQUID FILLED ORAL DAILY
Qty: 30 CAPSULE | Refills: 0 | Status: SHIPPED | OUTPATIENT
Start: 2020-08-23 | End: 2020-10-12

## 2020-08-22 RX ADMIN — HYDROMORPHONE HYDROCHLORIDE 2 MG: 2 INJECTION INTRAMUSCULAR; INTRAVENOUS; SUBCUTANEOUS at 05:08

## 2020-08-22 RX ADMIN — OXYCODONE HYDROCHLORIDE 10 MG: 10 TABLET ORAL at 12:08

## 2020-08-22 RX ADMIN — MUPIROCIN 1 G: 20 OINTMENT TOPICAL at 08:08

## 2020-08-22 RX ADMIN — OXYCODONE HYDROCHLORIDE 10 MG: 10 TABLET ORAL at 08:08

## 2020-08-22 RX ADMIN — ALUMINUM HYDROXIDE, MAGNESIUM HYDROXIDE, AND SIMETHICONE 30 ML: 200; 200; 20 SUSPENSION ORAL at 11:08

## 2020-08-22 RX ADMIN — METFORMIN HYDROCHLORIDE 1000 MG: 500 TABLET, FILM COATED ORAL at 08:08

## 2020-08-22 RX ADMIN — CEFTRIAXONE 1 G: 1 INJECTION, SOLUTION INTRAVENOUS at 09:08

## 2020-08-22 RX ADMIN — DOCUSATE SODIUM 100 MG: 100 CAPSULE, LIQUID FILLED ORAL at 08:08

## 2020-08-22 RX ADMIN — HYDROCHLOROTHIAZIDE 12.5 MG: 12.5 TABLET ORAL at 08:08

## 2020-08-22 RX ADMIN — PANTOPRAZOLE SODIUM 40 MG: 40 INJECTION, POWDER, LYOPHILIZED, FOR SOLUTION INTRAVENOUS at 09:08

## 2020-08-22 RX ADMIN — LISINOPRIL 40 MG: 40 TABLET ORAL at 08:08

## 2020-08-22 RX ADMIN — FLUOXETINE HYDROCHLORIDE 20 MG: 20 CAPSULE ORAL at 08:08

## 2020-08-22 RX ADMIN — METHOCARBAMOL TABLETS 750 MG: 750 TABLET, COATED ORAL at 08:08

## 2020-08-22 NOTE — PLAN OF CARE
08/22/20 0656   Patient Assessment/Suction   Respiratory Effort Unlabored   Expansion/Accessory Muscles/Retractions expansion symmetric;no retractions   Rhythm/Pattern, Respiratory no shortness of breath reported   Cough Frequency infrequent   PRE-TX-O2   O2 Device (Oxygen Therapy) room air   SpO2 98 %   Pulse Oximetry Type Intermittent   $ Pulse Oximetry - Multiple Charge Pulse Oximetry - Multiple   Incentive Spirometer   $ Incentive Spirometer Charges done with encouragement   Incentive Spirometer Predicted Level (mL) 2700   Administration (IS) proper technique demonstrated   Number of Repetitions (IS) 10   Level Incentive Spirometer (mL) 2000   Patient Tolerance (IS) good

## 2020-08-22 NOTE — DISCHARGE INSTRUCTIONS
You have been prescribed Protonix to take twice daily for the next 10 days for epigastric discomfort as we suspect this is gastritis.  You can utilize Maalox as needed.  Please take pain medication as per orthopedist for pain control postoperatively.  Please take care to prevent falls.  Please utilize stool softener while taking narcotics to prevent constipation.    Thank you for choosing [facility] for your medical care. The primary doctor who is taking care of you at the time of your discharge is Wilfrido Vang,*.     You were admitted to the hospital with CSF leak.     Please note your discharge instructions, including diet/activity restrictions, follow-up appointments, and medication changes.  If you have any questions about your medical issues, prescriptions, or any other questions, please feel free to contact the [facility] Medicine Dept at [phone] and we will help.    If you are previously with Home health, outpatient PT/OT or under a therapy program, you are cleared to return to those programs.    Please direct all long term medication refills and follow up to your primary care provider, Jamila Smith MD. Thank you again for letting us take care of your health care needs.    Please note the following discharge instructions per your discharging physician-  Jamila Hoover NP

## 2020-08-22 NOTE — CARE UPDATE
08/2000   Patient Assessment/Suction   Level of Consciousness (AVPU) alert   Respiratory Effort Unlabored   Expansion/Accessory Muscles/Retractions no retractions;no use of accessory muscles   Rhythm/Pattern, Respiratory no shortness of breath reported   Cough Frequency no cough   PRE-TX-O2   O2 Device (Oxygen Therapy) room air   Incentive Spirometer   $ Incentive Spirometer Charges done with encouragement;postop instruction;proper technique demonstrated;ready for self-administration;breath hold utilized   Administration (IS) proper technique demonstrated;mouthpiece;instruction provided, follow-up   Number of Repetitions (IS) 10   Level Incentive Spirometer (mL) 2000   Patient Tolerance (IS) good

## 2020-08-22 NOTE — SUBJECTIVE & OBJECTIVE
"Principal Problem:CSF leak    Principal Orthopedic Problem: S/P post-op CSF leak repair    Interval History: none    Review of patient's allergies indicates:  No Known Allergies    Current Facility-Administered Medications   Medication    acetaminophen tablet 650 mg    aluminum-magnesium hydroxide-simethicone 200-200-20 mg/5 mL suspension 30 mL    bisacodyL suppository 10 mg    cefTRIAXone (ROCEPHIN) 1 g/50 mL D5W IVPB    diphenhydrAMINE capsule 50 mg    docusate sodium capsule 100 mg    FLUoxetine capsule 20 mg    gabapentin capsule 300 mg    hydroCHLOROthiazide tablet 12.5 mg    hydromorphone (PF) injection 2 mg    lactated ringers infusion    lactated ringers infusion    lisinopriL tablet 40 mg    metFORMIN tablet 1,000 mg    methocarbamoL tablet 750 mg    mupirocin 2 % ointment 1 g    naloxone 0.4 mg/mL injection 0.02 mg    ondansetron disintegrating tablet 8 mg    oxyCODONE immediate release tablet 15 mg    oxyCODONE immediate release tablet 5 mg    oxyCODONE immediate release tablet Tab 10 mg    promethazine (PHENERGAN) 12.5 mg in dextrose 5 % 50 mL IVPB    senna-docusate 8.6-50 mg per tablet 2 tablet     Objective:     Vital Signs (Most Recent):  Temp: 97.8 °F (36.6 °C) (08/22/20 0729)  Pulse: 77 (08/22/20 0729)  Resp: 16 (08/22/20 0729)  BP: (!) 113/59 (08/22/20 0729)  SpO2: 98 % (08/22/20 0729) Vital Signs (24h Range):  Temp:  [96.5 °F (35.8 °C)-98.1 °F (36.7 °C)] 97.8 °F (36.6 °C)  Pulse:  [77-85] 77  Resp:  [16-20] 16  SpO2:  [97 %-99 %] 98 %  BP: (113-136)/(59-80) 113/59     Weight: 111.1 kg (245 lb)  Height: 5' 6" (167.6 cm)  Body mass index is 39.54 kg/m².      Intake/Output Summary (Last 24 hours) at 8/22/2020 0824  Last data filed at 8/22/2020 0600  Gross per 24 hour   Intake 1130 ml   Output 1300 ml   Net -170 ml       General    Nursing note and vitals reviewed.  Constitutional: She is oriented to person, place, and time.   Obese   Pulmonary/Chest: Effort normal. "   Neurological: She is alert and oriented to person, place, and time.   Psychiatric: She has a normal mood and affect. Her behavior is normal.         Back (L-Spine & T-Spine) / Neck (C-Spine) Exam     Comments:  Dressing is Clean and dry; NO drainage. BLEs DNVI.        Significant Labs:   CBC:   Recent Labs   Lab 08/20/20  1144 08/21/20  0500 08/22/20  0501   WBC 8.83 10.86 9.62   HGB 8.8* 9.0* 9.3*   HCT 29.2* 29.3* 29.9*    352* 332     CMP:   Recent Labs   Lab 08/20/20  1144 08/21/20  0500 08/22/20  0501    138 139   K 4.0 3.7 3.9    106 103   CO2 19* 25 25   * 89 93   BUN 8 7 6   CREATININE 0.7 0.7 0.7   CALCIUM 8.6* 8.6* 9.1   ANIONGAP 15 7* 11   EGFRNONAA >60 >60 >60     All pertinent labs within the past 24 hours have been reviewed.    Significant Imaging: None

## 2020-08-22 NOTE — PLAN OF CARE
VINITA contacted [facility] [phone] advising of pt d/c today, resume Home health.  [phone][facility]        08/22/20 0939   Post-Acute Status   Post-Acute Authorization Home Health   Home Health Status Set-up Complete   Discharge Plan   Discharge Plan A Home Health   Discharge Plan B Home Health;Home with family

## 2020-08-22 NOTE — PLAN OF CARE
"Plan of care reviewed with pt. States "understanding." Pt is AAOx4. IV site is without redness and swelling. PRN pain medication given for pain. VSS. Incisional dressing CDI. Barron still in place until pt tolerates OOB activity (Per Dr. Spain's note). No complaints of HA/N/V this shift. HOB currently lowered to 35 degrees per pt's request. Bed in low position, bed alarm set, call light in reach. Instructed to call staff for assistance. Will continue to monitor, observe and note any changes. Safety maintained.    "

## 2020-08-22 NOTE — PT/OT/SLP RE-EVAL
Physical Therapy Re-evaluation    Patient Name:  Aysha Waters   MRN:  [id number]    Recommendations:     Discharge Recommendations:  home, home health PT   Discharge Equipment Recommendations: none   Barriers to discharge: None    Assessment:     Aysha Waters is a 19 y.o. female admitted with a medical diagnosis of CSF leak.  She presents with the following impairments/functional limitations:  weakness, gait instability, pain. Pt is known to this PT from recent hospitalization for back surgery with complication of CSF leak. Upon follow up with surgeon, pt was noted to again have leaking of CSF from surgical incision site, hence re-admit.  Pt tolerated gait fairly well today, being limited by back pain as noted by pt leaning heavily on RW, using UEs to unload her back/legs. Recommend continuing with HH PT and eventually progressing to outpatient PT for return of independent mobility.    Rehab Prognosis:  good; patient would benefit from acute skilled PT services to address these deficits and reach maximum level of function.      Recent Surgery: Procedure(s) (LRB):  REPAIR, CSF LEAK, SPINE (N/A) 2 Days Post-Op    Plan:     During this hospitalization, patient to be seen daily to address the above listed problems via gait training, therapeutic activities, therapeutic exercises  · Plan of Care Expires:  08/29/20   Plan of Care Reviewed with: patient, mother    Subjective     Communicated with AURA Nina prior to session.  Patient found sitting up EOB with peripheral IV upon PT entry to room, agreeable to evaluation.      Chief Complaint: back pain 4/10  Patient comments/goals: to be able to go home with hopefully no more set backs.  Pain/Comfort:  · Pain Rating 1: 4/10  · Location 1: back  · Pain Addressed 1: Pre-medicate for activity, Reposition, Distraction, Nurse notified  · Pain Rating Post-Intervention 1: 4/10    Patients cultural, spiritual, Mandaen conflicts given the current situation:        Objective:      Patient found with: peripheral IV     General Precautions: Standard, fall   Orthopedic Precautions:spinal precautions   Braces: N/A     Exams:  · Cognitive Exam:  Patient is oriented to Person, Place, Time and Situation  · RLE ROM: WFL  · RLE Strength: WFL  · LLE ROM: WFL  · LLE Strength: WFL    Functional Mobility:  · Transfers:     · Sit to Stand:  stand by assistance and contact guard assistance with rolling walker  · Gait: x 250 ft with CGA for safety due to instability from pain/generalized weakness due to prolonged bedrest.     AM-PAC 6 CLICK MOBILITY  Total Score:23       Patient left sitting EOB per her request to eat her lunch with all lines intact, call button in reach, RN notified and pt's mother' present.    GOALS:   Multidisciplinary Problems     Physical Therapy Goals     Not on file                History:     Past Medical History:   Diagnosis Date    Anxiety     Depression     Hypertension     Insulin resistance     Knee derangement     Obesity     PCOS (polycystic ovarian syndrome)     Pinched nerve        Past Surgical History:   Procedure Laterality Date    KNEE ARTHROSCOPY, MEDIAL PATELLO FEMORAL LIGAMENT REPAIR      LAMINECTOMY N/A 8/11/2020    Procedure: LAMINECTOMY, SPINE L3, L4, L5;  Surgeon: Nirav Spain MD;  Location: [facility] OR;  Service: Orthopedics;  Laterality: N/A;    MOUTH SURGERY      petalla surgery Left 07/2019    medically broke the tib/fib and moved the pettella, no replacement  Dr [facility]    REPAIR OF CEREBROSPINAL FLUID LEAK OF SPINE N/A 8/20/2020    Procedure: REPAIR, CSF LEAK, SPINE;  Surgeon: Nirav Spain MD;  Location: [facility] OR;  Service: Orthopedics;  Laterality: N/A;    TONSILLECTOMY         Time Tracking:     PT Received On: 08/22/20  PT Start Time: 1145     PT Stop Time: 1159  PT Total Time (min): 14 min     Billable Minutes: Re-eval 14      Alison Alejo, PT  08/22/2020

## 2020-08-22 NOTE — NURSING
Elevated pt's HOB to 70 degrees (as high as the bed can go). Offered to place pillows behind pt to meet goal of 90 degree angle, but pt requested to wait until pain subsides. Given PRN pain medication for pain. Pt denies HA/N/V.. Will continue to monitor.      0550: Pt requested HOB be lowered down for a little while so she can turn on her side because she was uncomfortable and her bottom was getting sore. HOB lowered.

## 2020-08-22 NOTE — DISCHARGE SUMMARY
[facility] Medicine  Discharge Summary      Patient Name: Aysha Waters  MRN: [id number]  Admission Date: 8/20/2020  Hospital Length of Stay: 2 days  Discharge Date and Time:  08/22/2020 2:01 PM  Attending Physician: Wilfrido Vang,*   Discharging Provider: Jamila Hoover NP  Primary Care Provider: Jamila Smith MD      HPI:   Aysha Waters is a 19-year-old female PMHx significant for PCOS, HTN, obesity, and lumbar disc herniation S/P lumbar surgery 8/11 with Dr. Spain with complication of cerebral spinal fluid leak.  Pt reports HA, nausea, vomiting, and back pain upon discharge home with increased wound drainage.  She was evaluated in clinic by Dr. Spain who arranged for repeat wound exploration and dural repair.  Postoperatively, Pt reports back pain 5/10 in severity and well controlled with PCA.  She denies any nausea, vomiting, HA, chest pain, SOB, or abdominal complaints.  Incidentally, preoperative urinalysis reveals UTI.  Pt is on IV Abx postoperatively.  She was placed in outpatient recovery under the service of hospital Medicine for additional evaluation management.  Other pertinent medical Hx as below:    Procedure(s) (LRB):  REPAIR, CSF LEAK, SPINE (N/A)      Hospital Course:   This patient was monitor closely by hospital medicine after undergoingLumbar wound exploration with repair durotomy L4 level right with Dr. Spain on 08/20.  Bed rest postoperatively was maintained per orthopedic orders.  Head of bed was gradually increase with no nausea, vomiting, or HA.  Once cleared by Orthopedics, patient worked with PT/OT post-operatively who recommended Home health which was ordered.  Her pain was controlled with oral narcotics as prescribed by orthopedist.  She was discharged home after cleared by orthopedist on oral narcotics per orthopedic orders.  Preoperative urinalysis indicated Pt had UTI with Gram-negative rods growing on available cultures at this time.   Pt will be D/C to complete a course of Keflex after ongoing IV antibiotic therapy for UTI while in the hospital.  She was instructed that she will be called if sensitivities reveal need for new antibiotic therapy.  Fall precautions were discussed with patient and family. Return precautions were discussed at length. Patient to follow up with primary care provider on discharge for any medical needs     Physical Exam:  HRRR; lungs CTA.  Lumbar incision with dressing clean dry intact.  Moves all extremities appropriately.  No sensory deficits.       Consults:   Consults (From admission, onward)        Status Ordering Provider     Anesthesia consult for PCA management  Once     Provider:  (Not yet assigned)    BHUPINDER Ayon          No new Assessment & Plan notes have been filed under this hospital service since the last note was generated.  Service: Hospital Medicine    Final Active Diagnoses:    Diagnosis Date Noted POA    PRINCIPAL PROBLEM:  CSF leak [G96.0] 08/19/2020 Yes    Ruptured lumbar intervertebral disc [M51.26] 08/11/2020 Yes    Acute cystitis without hematuria [N30.00] 08/20/2020 Yes    Essential hypertension [I10] 09/18/2019 Yes    Obesity [E66.9] 06/12/2015 Yes      Problems Resolved During this Admission:       Discharged Condition: good    Disposition: Home or Self Care    Follow Up:  Follow-up Information     Nirav Spain MD On 9/4/2020.    Specialties: Orthopedic Surgery, Surgery  Why: keep post-op apt  Contact information:  [address] [address]  [phone]             Jamila Smith MD In 1 week.    Specialty: Family Medicine  Contact information:  [address] [facility] [address]  [phone]                 Patient Instructions:      Diet Adult Regular     Notify your health care provider if you experience any of the following:  temperature >100.4     Notify your health care provider if you experience any of the following:   persistent nausea and vomiting or diarrhea     Notify your health care provider if you experience any of the following:  severe uncontrolled pain     Notify your health care provider if you experience any of the following:  redness, tenderness, or signs of infection (pain, swelling, redness, odor or green/yellow discharge around incision site)     Notify your health care provider if you experience any of the following:  increased confusion or weakness     SUBSEQUENT HOME HEALTH ORDERS   Order Comments: Skilled nurse and PT 3x wk.  Dr. Spain to follow for all orders, progress, and signatures.     Order Specific Question Answer Comments   What Home Health Agency is the patient currently using? Other/External      Activity as tolerated       Significant Diagnostic Studies: Labs:   CMP   Recent Labs   Lab 08/21/20  0500 08/22/20  0501    139   K 3.7 3.9    103   CO2 25 25   GLU 89 93   BUN 7 6   CREATININE 0.7 0.7   CALCIUM 8.6* 9.1   ANIONGAP 7* 11   ESTGFRAFRICA >60 >60   EGFRNONAA >60 >60    and CBC   Recent Labs   Lab 08/21/20  0500 08/22/20  0501   WBC 10.86 9.62   HGB 9.0* 9.3*   HCT 29.3* 29.9*   * 332       Pending Diagnostic Studies:     None         Medications:  Reconciled Home Medications:      Medication List      START taking these medications    aluminum-magnesium hydroxide-simethicone 200-200-20 mg/5 mL Susp  Commonly known as: MAALOX  Take 30 mLs by mouth every 4 (four) hours as needed.     cephALEXin 500 MG capsule  Commonly known as: KEFLEX  Take 1 capsule (500 mg total) by mouth every 12 (twelve) hours.     docusate sodium 100 MG capsule  Commonly known as: COLACE  Take 1 capsule (100 mg total) by mouth once daily.  Start taking on: August 23, 2020     oxyCODONE-acetaminophen  mg per tablet  Commonly known as: PERCOCET  Take 1 tablet by mouth every 4 (four) hours as needed for Pain.     pantoprazole 40 MG tablet  Commonly known as: PROTONIX  Take 1 tablet (40 mg total) by  mouth 2 (two) times daily. for 10 days        CONTINUE taking these medications    ethynodiol-ethinyl estradiol 1-50 mg-mcg per tablet  Commonly known as: ZOVIA  Take 1 tablet by mouth once daily.     FLUoxetine 20 MG capsule  Take 20 mg by mouth once daily.     gabapentin 300 MG capsule  Commonly known as: NEURONTIN  Take 1 capsule (300 mg total) by mouth every evening.     hydroCHLOROthiazide 12.5 MG Tab  Commonly known as: HYDRODIURIL  Take 1 tablet (12.5 mg total) by mouth once daily.     lisinopriL 40 MG tablet  Commonly known as: PRINIVIL,ZESTRIL  Take 1 tablet (40 mg total) by mouth once daily.     metFORMIN 500 MG tablet  Commonly known as: GLUCOPHAGE  Take 1,000 mg by mouth 2 (two) times daily with meals.     methocarbamoL 750 MG Tab  Commonly known as: ROBAXIN  Take 1-2 tablets by mouth every 8 hours as needed for muscle spasms     traMADoL 50 mg tablet  Commonly known as: ULTRAM  Take 1-2 po q 6 hours prn pain.     tretinoin 0.05 % cream  Commonly known as: RETIN-A  Apply topically every evening.            Indwelling Lines/Drains at time of discharge:   Lines/Drains/Airways     None                 Time spent on the discharge of patient: 36 minutes  Patient was seen and examined on the date of discharge and determined to be suitable for discharge.         Jamila Hoover NP  Department of Hospital Medicine  [facility]

## 2020-08-22 NOTE — ASSESSMENT & PLAN NOTE
DC Barron now  OOB with PT and nursing  Change dressing today  IV PPI - notify hospitalist of epigastric pain  Plan for DC home this am

## 2020-08-22 NOTE — PLAN OF CARE
POC reviewed with pt and mother; both verbalized understanding. Pt AAO x 4. Bedrest maintained per order. HOB currently at 45 degrees; pt tolerating well without any N/V/HA. Barron draining clear yellow urine to gravity. Room air. PRN pain medication administered. Pt afebrile. PIV cdi. Abx administered. Surgical dressing cdi. Dressing changed per order. PCA pump d/c'd today per nursing order. Call bell in reach, bed locked, bed alarm on, and fall band and non skid socks on. Will continue to monitor.

## 2020-08-22 NOTE — PLAN OF CARE
Pt clear w/ case management for d/c.       08/22/20 6723   Final Note   Assessment Type Final Discharge Note   Anticipated Discharge Disposition Home-Health  ([facility] (Resume))

## 2020-08-22 NOTE — HOSPITAL COURSE
This patient was monitor closely by hospital medicine after undergoingLumbar wound exploration with repair durotomy L4 level right with Dr. Spain on 08/20.  Bed rest postoperatively was maintained per orthopedic orders.  Head of bed was gradually increase with no nausea, vomiting, or HA.  Once cleared by Orthopedics, patient worked with PT/OT post-operatively who recommended Home health which was ordered.  Her pain was controlled with oral narcotics as prescribed by orthopedist.  She was discharged home after cleared by orthopedist on oral narcotics per orthopedic orders.  Preoperative urinalysis indicated Pt had UTI with Gram-negative rods growing on available cultures at this time.  Pt will be D/C to complete a course of Keflex after ongoing IV antibiotic therapy for UTI while in the hospital.  She was instructed that she will be called if sensitivities reveal need for new antibiotic therapy.  Fall precautions were discussed with patient and family. Return precautions were discussed at length. Patient to follow up with primary care provider on discharge for any medical needs     Physical Exam:  HRRR; lungs CTA.  Lumbar incision with dressing clean dry intact.  Moves all extremities appropriately.  No sensory deficits.

## 2020-08-22 NOTE — PLAN OF CARE
Reviewed discharge instructions with patient and mother. Both verbalize understanding. Rx handed to mother. Dressing changed to back incision, incision well approximated, staples intact.  Discharged via wheel chair to private car.

## 2020-08-24 ENCOUNTER — TELEPHONE (OUTPATIENT)
Dept: ORTHOPEDICS | Facility: CLINIC | Age: 20
End: 2020-08-24

## 2020-08-24 ENCOUNTER — TELEPHONE (OUTPATIENT)
Dept: MEDSURG UNIT | Facility: HOSPITAL | Age: 20
End: 2020-08-24

## 2020-08-25 ENCOUNTER — EXTERNAL HOME HEALTH (OUTPATIENT)
Dept: HOME HEALTH SERVICES | Facility: HOSPITAL | Age: 20
End: 2020-08-25
Payer: COMMERCIAL

## 2020-09-02 NOTE — TELEPHONE ENCOUNTER
----- Message from Jewel Harp sent at 9/2/2020 11:16 AM CDT -----  Regarding: Rx refill  Type:  RX Refill Request    Who Called:  Ptnt  [phone]      Refill or New Rx:  Refill    RX Name and Strength:  ethynodiol-ethinyl estradiol (ZOVIA) 1-50 mg-mcg per tablet     --  Sig - Route: Take 1 tablet by mouth once daily. - Oral  Class: Historical Med      How is the patient currently taking it? (ex. 1XDay):  See above    Is this a 30 day or 90 day RX:  See above    Preferred Pharmacy with phone number:      [facility] [address] [facility] [place] - [address]  [address]  [place] [address]  Phone: [phone] Fax: [phone]        Local or Mail Order:  Local    Ordering Provider:  Dr Hussein Sheth Call Back Number:  Ptnt  [phone]      Additional Information:  Advised completely out of medication at this point. Please send to pharmacy as soon as possible.

## 2020-09-03 ENCOUNTER — DOCUMENT SCAN (OUTPATIENT)
Dept: HOME HEALTH SERVICES | Facility: HOSPITAL | Age: 20
End: 2020-09-03
Payer: COMMERCIAL

## 2020-09-03 PROCEDURE — 99499 NO LOS: ICD-10-PCS | Mod: ,,, | Performed by: FAMILY MEDICINE

## 2020-09-03 PROCEDURE — 99499 UNLISTED E&M SERVICE: CPT | Mod: ,,, | Performed by: FAMILY MEDICINE

## 2020-09-03 RX ORDER — ETHYNODIOL DIACETATE AND ETHINYL ESTRADIOL 1 MG-50MCG
1 KIT ORAL DAILY
Qty: 30 TABLET | Refills: 3 | Status: SHIPPED | OUTPATIENT
Start: 2020-09-03 | End: 2021-08-26

## 2020-09-04 ENCOUNTER — OFFICE VISIT (OUTPATIENT)
Dept: ORTHOPEDICS | Facility: CLINIC | Age: 20
End: 2020-09-04
Payer: COMMERCIAL

## 2020-09-04 VITALS
SYSTOLIC BLOOD PRESSURE: 122 MMHG | HEART RATE: 74 BPM | BODY MASS INDEX: 38.57 KG/M2 | HEIGHT: 66 IN | WEIGHT: 240 LBS | DIASTOLIC BLOOD PRESSURE: 72 MMHG

## 2020-09-04 DIAGNOSIS — Z98.890 STATUS POST LUMBAR LAMINECTOMY: Primary | ICD-10-CM

## 2020-09-04 PROCEDURE — 99024 PR POST-OP FOLLOW-UP VISIT: ICD-10-PCS | Mod: S$GLB,,, | Performed by: PHYSICIAN ASSISTANT

## 2020-09-04 PROCEDURE — 99024 POSTOP FOLLOW-UP VISIT: CPT | Mod: S$GLB,,, | Performed by: PHYSICIAN ASSISTANT

## 2020-09-04 RX ORDER — TRAMADOL HYDROCHLORIDE 50 MG/1
50 TABLET ORAL EVERY 6 HOURS PRN
Qty: 28 TABLET | Refills: 0 | Status: SHIPPED | OUTPATIENT
Start: 2020-09-04 | End: 2020-09-11

## 2020-09-04 NOTE — LETTER
September 4, 2020      [facility]  [address]  [place] [address]  Phone: [phone]  Fax: [phone]       Patient: Aysha Waters   YOB: 2000  Date of Visit: 09/04/2020    To Whom It May Concern:    Rylan Waters  was at our office on 09/04/2020. She may return to work on 09/08/2020 but may not lift anything while at her shift. She may do sedentary work at her desk.       Sincerely,    Conrad Aguilar PA-C

## 2020-09-04 NOTE — PROGRESS NOTES
Subjective:    Patient ID: Aysha Waters is a 19 y.o. female.    Chief Complaint: Post-op Evaluation of the Lumbar Spine (PO Lumbar CSF leak repair 8/20/20, L3,4,5 Laminectomy 8/11/20. States overall she is doing much better, no headaches or leg pain. Minimal soreness in back. Ambulating with no assistance today.)      History of Present Illness  Patient is here status post CSF leak repair 08/20/2020 secondary to intraoperative CSF leak tear during L3 and L4 and L5 laminectomy diskectomy which was done on 08/11/2020.  Overall she is doing great.  She has some superficial pain around the incision and some mild drainage daily.  Denies any headaches are nor nausea or vomiting and her back and leg pain are much better.    Current Medications  Current Outpatient Medications   Medication Sig Dispense Refill    aluminum-magnesium hydroxide-simethicone (MAALOX) 200-200-20 mg/5 mL Susp Take 30 mLs by mouth every 4 (four) hours as needed.      docusate sodium (COLACE) 100 MG capsule Take 1 capsule (100 mg total) by mouth once daily. 30 capsule 0    ethynodiol-ethinyl estradiol (ZOVIA) 1-50 mg-mcg per tablet Take 1 tablet by mouth once daily. 30 tablet 3    FLUoxetine (PROZAC) 20 MG capsule Take 20 mg by mouth once daily.      hydroCHLOROthiazide (HYDRODIURIL) 12.5 MG Tab Take 1 tablet (12.5 mg total) by mouth once daily. 30 tablet 11    lisinopril (PRINIVIL,ZESTRIL) 40 MG tablet Take 1 tablet (40 mg total) by mouth once daily. 90 tablet 3    metFORMIN (GLUCOPHAGE) 500 MG tablet Take 1,000 mg by mouth 2 (two) times daily with meals.       methocarbamoL (ROBAXIN) 750 MG Tab Take 1-2 tablets by mouth every 8 hours as needed for muscle spasms 30 tablet 0    traMADoL (ULTRAM) 50 mg tablet Take 1-2 po q 6 hours prn pain. 56 tablet 0    tretinoin (RETIN-A) 0.05 % cream Apply topically every evening. 45 g 0    gabapentin (NEURONTIN) 300 MG capsule Take 1 capsule (300 mg total) by mouth every evening. 30 capsule 1     pantoprazole (PROTONIX) 40 MG tablet Take 1 tablet (40 mg total) by mouth 2 (two) times daily. for 10 days 20 tablet 0    traMADoL (ULTRAM) 50 mg tablet Take 1 tablet (50 mg total) by mouth every 6 (six) hours as needed for Pain. 28 tablet 0     No current facility-administered medications for this visit.        Allergies  Review of patient's allergies indicates:  No Known Allergies    Past Medical History  Past Medical History:   Diagnosis Date    Anxiety     Depression     Hypertension     Insulin resistance     Knee derangement     Obesity     PCOS (polycystic ovarian syndrome)     Pinched nerve        Surgical History  Past Surgical History:   Procedure Laterality Date    KNEE ARTHROSCOPY, MEDIAL PATELLO FEMORAL LIGAMENT REPAIR      LAMINECTOMY N/A 8/11/2020    Procedure: LAMINECTOMY, SPINE L3, L4, L5;  Surgeon: Nirav Spain MD;  Location: [facility] OR;  Service: Orthopedics;  Laterality: N/A;    MOUTH SURGERY      petalla surgery Left 07/2019    medically broke the tib/fib and moved the pettella, no replacement  Dr Mayank Hugo [facility]    REPAIR OF CEREBROSPINAL FLUID LEAK OF SPINE N/A 8/20/2020    Procedure: REPAIR, CSF LEAK, SPINE;  Surgeon: Nirav Spain MD;  Location: [facility] OR;  Service: Orthopedics;  Laterality: N/A;    TONSILLECTOMY         Family History:   Family History   Problem Relation Age of Onset    Heart disease Father     Hypertension Father     Obesity Sister     Heart disease Paternal Grandfather        Social History:   Social History     Socioeconomic History    Marital status: Single     Spouse name: Not on file    Number of children: Not on file    Years of education: Not on file    Highest education level: Not on file   Occupational History    Not on file   Social Needs    Financial resource strain: Not very hard    Food insecurity     Worry: Never true     Inability: Never true    Transportation needs     Medical: No     Non-medical: No    Tobacco Use    Smoking status: Never Smoker    Smokeless tobacco: Never Used   Substance and Sexual Activity    Alcohol use: No     Frequency: Never     Binge frequency: Never    Drug use: No    Sexual activity: Never     Birth control/protection: OCP   Lifestyle    Physical activity     Days per week: 2 days     Minutes per session: 30 min    Stress: Rather much   Relationships    Social connections     Talks on phone: Three times a week     Gets together: More than three times a week     Attends Rastafarian service: Not on file     Active member of club or organization: No     Attends meetings of clubs or organizations: Never     Relationship status: Never    Other Topics Concern    Not on file   Social History Narrative    Lives with parents and one sibling. In 9th grade, all As       Date of surgery:  08/20/2020 and 08/11/2020    Review of Systems     General/Constitutional: Chills denies. Fatigue denies. Fever denies. Weight gain denies. Weight loss denies.    Musculoskeletal: Comments: See HPI for details    Skin: Rash denies.    Objective:   Vital Signs:   Vitals:    09/04/20 0914   BP: 122/72   Pulse: 74        Physical Exam    This a well-developed, well nourished patient in no acute distress.  They are alert and oriented and cooperative to examination.  Pt. walks without an antalgic gait.      General Examination:     Constitutional: The patient is alert and oriented to lace person and time. Mood is pleasant.     Head/Face: Normal facial features normal eyebrows    Eyes: Normal extraocular motion bilaterally    Lungs: Respirations are equal and unlabored    Gait is coordinated.    Cardiovascular: There are no swelling or varicosities present.    Lymphatic: Negative for adenopathy    Skin: Normal    Neurological: Level of consciousness normal. Oriented to place person and time and situation    Psychiatric: Oriented to time place person and situation    Lumbar exam:  Patient is obese.   Posterior lumbar incision healing well with no signs or symptoms of infection.  Small area of scabbing with some very mild drainage.  We removed all of the staples today and place some Steri-Strips.  There was some mild bleeding.  Functional mobility is back to normal with a nonantalgic gait and full lumbar range of motion with some guarding.  Bilateral lower extremities are distal neurovascular intact.    XRAY Report/ Interpretation:  No new studies today      Assessment:       1. Status post lumbar laminectomy        Plan:       Aysha was seen today for post-op evaluation.    Diagnoses and all orders for this visit:    Status post lumbar laminectomy  -     traMADoL (ULTRAM) 50 mg tablet; Take 1 tablet (50 mg total) by mouth every 6 (six) hours as needed for Pain.         Follow up in about 4 weeks (around 10/2/2020).    Increase activity as tolerated but avoid all straining activities.  May resume driving and sedentary work.  Follow-up in 4 weeks or sooner if needed.  Cleanse the incision area with peroxide daily and keep clean and dry.      This note was created using Dragon voice recognition software that occasionally misinterpreted phrases or words.

## 2020-09-09 ENCOUNTER — TELEPHONE (OUTPATIENT)
Dept: ORTHOPEDICS | Facility: CLINIC | Age: 20
End: 2020-09-09

## 2020-09-09 NOTE — TELEPHONE ENCOUNTER
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

## 2020-09-09 NOTE — TELEPHONE ENCOUNTER
Left a message for Arelis Garcia with [facility] that Ayshajavan Waters can discontinue home therapy

## 2020-10-12 ENCOUNTER — OFFICE VISIT (OUTPATIENT)
Dept: ORTHOPEDICS | Facility: CLINIC | Age: 20
End: 2020-10-12
Payer: COMMERCIAL

## 2020-10-12 VITALS
HEART RATE: 125 BPM | WEIGHT: 240 LBS | HEIGHT: 66 IN | BODY MASS INDEX: 38.57 KG/M2 | SYSTOLIC BLOOD PRESSURE: 128 MMHG | DIASTOLIC BLOOD PRESSURE: 80 MMHG

## 2020-10-12 DIAGNOSIS — Z98.890 STATUS POST LUMBAR LAMINECTOMY: Primary | ICD-10-CM

## 2020-10-12 PROCEDURE — 99024 POSTOP FOLLOW-UP VISIT: CPT | Mod: S$GLB,,, | Performed by: ORTHOPAEDIC SURGERY

## 2020-10-12 PROCEDURE — 99024 PR POST-OP FOLLOW-UP VISIT: ICD-10-PCS | Mod: S$GLB,,, | Performed by: ORTHOPAEDIC SURGERY

## 2020-10-12 NOTE — PROGRESS NOTES
Subjective:    Patient ID: Aysha Waters is a 19 y.o. female.    Chief Complaint: Post-op Evaluation of the Lumbar Spine (Lumbar Laminectomy L3,4,5 8/11/20, CSF leak repair 8/20/20. States that she is doing much better, not having any pain. )      History of Present Illness  Status post lumbar laminectomy and repair of CSF leak she is now doing well her radiculopathy prior to surgery has been resolved.    Current Medications  Current Outpatient Medications   Medication Sig Dispense Refill    ethynodiol-ethinyl estradiol (ZOVIA) 1-50 mg-mcg per tablet Take 1 tablet by mouth once daily. 30 tablet 3    FLUoxetine (PROZAC) 20 MG capsule Take 20 mg by mouth once daily.      hydroCHLOROthiazide (HYDRODIURIL) 12.5 MG Tab Take 1 tablet (12.5 mg total) by mouth once daily. 30 tablet 11    lisinopril (PRINIVIL,ZESTRIL) 40 MG tablet Take 1 tablet (40 mg total) by mouth once daily. 90 tablet 3    metFORMIN (GLUCOPHAGE) 500 MG tablet Take 1,000 mg by mouth 2 (two) times daily with meals.       tretinoin (RETIN-A) 0.05 % cream Apply topically every evening. 45 g 0    gabapentin (NEURONTIN) 300 MG capsule Take 1 capsule (300 mg total) by mouth every evening. 30 capsule 1    pantoprazole (PROTONIX) 40 MG tablet Take 1 tablet (40 mg total) by mouth 2 (two) times daily. for 10 days 20 tablet 0     No current facility-administered medications for this visit.        Allergies  Review of patient's allergies indicates:  No Known Allergies    Past Medical History  Past Medical History:   Diagnosis Date    Anxiety     Depression     Hypertension     Insulin resistance     Knee derangement     Obesity     PCOS (polycystic ovarian syndrome)     Pinched nerve        Surgical History  Past Surgical History:   Procedure Laterality Date    KNEE ARTHROSCOPY, MEDIAL PATELLO FEMORAL LIGAMENT REPAIR      LAMINECTOMY N/A 8/11/2020    Procedure: LAMINECTOMY, SPINE L3, L4, L5;  Surgeon: Nirav Spain MD;  Location: [facility] OR;   Service: Orthopedics;  Laterality: N/A;    MOUTH SURGERY      petalla surgery Left 07/2019    medically broke the tib/fib and moved the pettella, no replacement  [facility]    REPAIR OF CEREBROSPINAL FLUID LEAK OF SPINE N/A 8/20/2020    Procedure: REPAIR, CSF LEAK, SPINE;  Surgeon: Nirav Spain MD;  Location: [facility] OR;  Service: Orthopedics;  Laterality: N/A;    TONSILLECTOMY         Family History:   Family History   Problem Relation Age of Onset    Heart disease Father     Hypertension Father     Obesity Sister     Heart disease Paternal Grandfather        Social History:   Social History     Socioeconomic History    Marital status: Single     Spouse name: Not on file    Number of children: Not on file    Years of education: Not on file    Highest education level: Not on file   Occupational History    Not on file   Social Needs    Financial resource strain: Not very hard    Food insecurity     Worry: Never true     Inability: Never true    Transportation needs     Medical: No     Non-medical: No   Tobacco Use    Smoking status: Never Smoker    Smokeless tobacco: Never Used   Substance and Sexual Activity    Alcohol use: No     Frequency: Never     Binge frequency: Never    Drug use: No    Sexual activity: Never     Birth control/protection: OCP   Lifestyle    Physical activity     Days per week: 2 days     Minutes per session: 30 min    Stress: Rather much   Relationships    Social connections     Talks on phone: Three times a week     Gets together: More than three times a week     Attends [employer] service: Not on file     Active member of club or organization: No     Attends meetings of clubs or organizations: Never     Relationship status: Never    Other Topics Concern    Not on file   Social History Narrative    Lives with parents and one sibling. In 9th grade, all As       Date of surgery:     Review of Systems     General/Constitutional:  Chills denies. Fatigue denies. Fever denies. Weight gain denies. Weight loss denies.    Musculoskeletal: Comments: See HPI for details    Skin: Rash denies.    Objective:   Vital Signs:   Vitals:    10/12/20 1418   BP: 128/80   Pulse: (!) 125        Physical Exam    This a well-developed, well nourished patient in no acute distress.  They are alert and oriented and cooperative to examination.  Pt. walks without an antalgic gait.      General Examination:     Constitutional: The patient is alert and oriented to lace person and time. Mood is pleasant.     Head/Face: Normal facial features normal eyebrows    Eyes: Normal extraocular motion bilaterally    Lungs: Respirations are equal and unlabored    Gait is coordinated.    Cardiovascular: There are no swelling or varicosities present.    Lymphatic: Negative for adenopathy    Skin: Normal    Neurological: Level of consciousness normal. Oriented to place person and time and situation    Psychiatric: Oriented to time place person and situation    Lumbar incision well healed nontender straight-leg-raising negative  XRAY Report/ Interpretation:        Assessment:       1. Status post lumbar laminectomy        Plan:       Aysha was seen today for post-op evaluation.    Diagnoses and all orders for this visit:    Status post lumbar laminectomy         No follow-ups on file.    Doing well clinically activity as tolerated return as needed      This note was created using Dragon voice recognition software that occasionally misinterpreted phrases or words.

## 2021-05-31 ENCOUNTER — HOSPITAL ENCOUNTER (EMERGENCY)
Facility: HOSPITAL | Age: 21
Discharge: HOME | End: 2021-05-31
Attending: EMERGENCY MEDICINE
Payer: COMMERCIAL

## 2021-05-31 VITALS
HEART RATE: 92 BPM | RESPIRATION RATE: 20 BRPM | HEIGHT: 66 IN | BODY MASS INDEX: 41.78 KG/M2 | SYSTOLIC BLOOD PRESSURE: 147 MMHG | WEIGHT: 260 LBS | OXYGEN SATURATION: 99 % | TEMPERATURE: 98 F | DIASTOLIC BLOOD PRESSURE: 97 MMHG

## 2021-05-31 DIAGNOSIS — N83.201 CYST OF RIGHT OVARY: Primary | ICD-10-CM

## 2021-05-31 LAB
ALBUMIN SERPL BCP-MCNC: 4.2 G/DL (ref 3.5–5.2)
ALP SERPL-CCNC: 69 U/L (ref 55–135)
ALT SERPL W/O P-5'-P-CCNC: 24 U/L (ref 10–44)
ANION GAP SERPL CALC-SCNC: 15 MMOL/L (ref 8–16)
AST SERPL-CCNC: 17 U/L (ref 10–40)
B-HCG UR QL: NEGATIVE
BACTERIA #/AREA URNS HPF: ABNORMAL /HPF
BASOPHILS # BLD AUTO: 0.09 K/UL (ref 0–0.2)
BASOPHILS NFR BLD: 0.7 % (ref 0–1.9)
BILIRUB SERPL-MCNC: 0.3 MG/DL (ref 0.1–1)
BILIRUB UR QL STRIP: NEGATIVE
BUN SERPL-MCNC: 7 MG/DL (ref 6–20)
CALCIUM SERPL-MCNC: 9.3 MG/DL (ref 8.7–10.5)
CAOX CRY URNS QL MICRO: ABNORMAL
CHLORIDE SERPL-SCNC: 105 MMOL/L (ref 95–110)
CLARITY UR: CLEAR
CO2 SERPL-SCNC: 19 MMOL/L (ref 23–29)
COLOR UR: YELLOW
CREAT SERPL-MCNC: 0.6 MG/DL (ref 0.5–1.4)
DIFFERENTIAL METHOD: ABNORMAL
EOSINOPHIL # BLD AUTO: 0.2 K/UL (ref 0–0.5)
EOSINOPHIL NFR BLD: 1.8 % (ref 0–8)
ERYTHROCYTE [DISTWIDTH] IN BLOOD BY AUTOMATED COUNT: 13.9 % (ref 11.5–14.5)
EST. GFR  (AFRICAN AMERICAN): >60 ML/MIN/1.73 M^2
EST. GFR  (NON AFRICAN AMERICAN): >60 ML/MIN/1.73 M^2
GLUCOSE SERPL-MCNC: 118 MG/DL (ref 70–110)
GLUCOSE UR QL STRIP: NEGATIVE
HCT VFR BLD AUTO: 37 % (ref 37–48.5)
HGB BLD-MCNC: 12.2 G/DL (ref 12–16)
HGB UR QL STRIP: NEGATIVE
IMM GRANULOCYTES # BLD AUTO: 0.09 K/UL (ref 0–0.04)
IMM GRANULOCYTES NFR BLD AUTO: 0.7 % (ref 0–0.5)
KETONES UR QL STRIP: NEGATIVE
LEUKOCYTE ESTERASE UR QL STRIP: ABNORMAL
LYMPHOCYTES # BLD AUTO: 3.5 K/UL (ref 1–4.8)
LYMPHOCYTES NFR BLD: 28.6 % (ref 18–48)
MCH RBC QN AUTO: 25.9 PG (ref 27–31)
MCHC RBC AUTO-ENTMCNC: 33 G/DL (ref 32–36)
MCV RBC AUTO: 79 FL (ref 82–98)
MICROSCOPIC COMMENT: ABNORMAL
MONOCYTES # BLD AUTO: 0.7 K/UL (ref 0.3–1)
MONOCYTES NFR BLD: 5.5 % (ref 4–15)
NEUTROPHILS # BLD AUTO: 7.7 K/UL (ref 1.8–7.7)
NEUTROPHILS NFR BLD: 62.7 % (ref 38–73)
NITRITE UR QL STRIP: NEGATIVE
NRBC BLD-RTO: 0 /100 WBC
PH UR STRIP: 5 [PH] (ref 5–8)
PLATELET # BLD AUTO: 324 K/UL (ref 150–450)
PMV BLD AUTO: 10.3 FL (ref 9.2–12.9)
POTASSIUM SERPL-SCNC: 4.1 MMOL/L (ref 3.5–5.1)
PROT SERPL-MCNC: 7.3 G/DL (ref 6–8.4)
PROT UR QL STRIP: NEGATIVE
RBC # BLD AUTO: 4.71 M/UL (ref 4–5.4)
RBC #/AREA URNS HPF: 20 /HPF (ref 0–4)
SODIUM SERPL-SCNC: 139 MMOL/L (ref 136–145)
SP GR UR STRIP: 1.02 (ref 1–1.03)
URN SPEC COLLECT METH UR: ABNORMAL
UROBILINOGEN UR STRIP-ACNC: NEGATIVE EU/DL
WBC # BLD AUTO: 12.34 K/UL (ref 3.9–12.7)
WBC #/AREA URNS HPF: 25 /HPF (ref 0–5)

## 2021-05-31 PROCEDURE — 74177 CT ABD & PELVIS W/CONTRAST: CPT | Mod: TC

## 2021-05-31 PROCEDURE — 74177 CT ABD & PELVIS W/CONTRAST: CPT | Mod: 26,,, | Performed by: RADIOLOGY

## 2021-05-31 PROCEDURE — 74177 CT ABDOMEN PELVIS WITH CONTRAST: ICD-10-PCS | Mod: 26,,, | Performed by: RADIOLOGY

## 2021-05-31 PROCEDURE — 80053 COMPREHEN METABOLIC PANEL: CPT | Performed by: EMERGENCY MEDICINE

## 2021-05-31 PROCEDURE — 99285 EMERGENCY DEPT VISIT HI MDM: CPT | Mod: 25

## 2021-05-31 PROCEDURE — 81025 URINE PREGNANCY TEST: CPT | Performed by: EMERGENCY MEDICINE

## 2021-05-31 PROCEDURE — 87186 SC STD MICRODIL/AGAR DIL: CPT | Performed by: EMERGENCY MEDICINE

## 2021-05-31 PROCEDURE — 87077 CULTURE AEROBIC IDENTIFY: CPT | Performed by: EMERGENCY MEDICINE

## 2021-05-31 PROCEDURE — 85025 COMPLETE CBC W/AUTO DIFF WBC: CPT | Performed by: EMERGENCY MEDICINE

## 2021-05-31 PROCEDURE — 81000 URINALYSIS NONAUTO W/SCOPE: CPT | Performed by: EMERGENCY MEDICINE

## 2021-05-31 PROCEDURE — 87086 URINE CULTURE/COLONY COUNT: CPT | Performed by: EMERGENCY MEDICINE

## 2021-05-31 PROCEDURE — 25500020 PHARM REV CODE 255: Performed by: EMERGENCY MEDICINE

## 2021-05-31 PROCEDURE — 87088 URINE BACTERIA CULTURE: CPT | Performed by: EMERGENCY MEDICINE

## 2021-05-31 RX ORDER — NAPROXEN SODIUM 550 MG/1
550 TABLET ORAL 2 TIMES DAILY WITH MEALS
Qty: 30 TABLET | Refills: 0 | Status: SHIPPED | OUTPATIENT
Start: 2021-05-31 | End: 2021-06-23

## 2021-05-31 RX ORDER — METHOCARBAMOL 500 MG/1
500 TABLET, FILM COATED ORAL 3 TIMES DAILY
Qty: 30 TABLET | Refills: 0 | Status: SHIPPED | OUTPATIENT
Start: 2021-05-31 | End: 2021-06-05

## 2021-05-31 RX ADMIN — IOHEXOL 100 ML: 350 INJECTION, SOLUTION INTRAVENOUS at 11:05

## 2021-06-03 LAB — BACTERIA UR CULT: ABNORMAL

## 2021-08-21 ENCOUNTER — HOSPITAL ENCOUNTER (EMERGENCY)
Facility: HOSPITAL | Age: 21
Discharge: HOME | End: 2021-08-22
Attending: FAMILY MEDICINE
Payer: COMMERCIAL

## 2021-08-21 DIAGNOSIS — N83.201 CYST OF RIGHT OVARY: ICD-10-CM

## 2021-08-21 DIAGNOSIS — N30.00 ACUTE CYSTITIS WITHOUT HEMATURIA: Primary | ICD-10-CM

## 2021-08-21 PROCEDURE — 99284 EMERGENCY DEPT VISIT MOD MDM: CPT

## 2021-08-22 VITALS
RESPIRATION RATE: 18 BRPM | HEART RATE: 90 BPM | DIASTOLIC BLOOD PRESSURE: 106 MMHG | WEIGHT: 220 LBS | BODY MASS INDEX: 35.36 KG/M2 | SYSTOLIC BLOOD PRESSURE: 169 MMHG | OXYGEN SATURATION: 97 % | HEIGHT: 66 IN | TEMPERATURE: 98 F

## 2021-08-22 LAB
AMORPH CRY URNS QL MICRO: ABNORMAL
B-HCG UR QL: NEGATIVE
BACTERIA #/AREA URNS HPF: ABNORMAL /HPF
BILIRUB UR QL STRIP: NEGATIVE
CLARITY UR: ABNORMAL
COLOR UR: YELLOW
GLUCOSE UR QL STRIP: ABNORMAL
HGB UR QL STRIP: NEGATIVE
KETONES UR QL STRIP: ABNORMAL
LEUKOCYTE ESTERASE UR QL STRIP: NEGATIVE
MICROSCOPIC COMMENT: ABNORMAL
NITRITE UR QL STRIP: POSITIVE
PH UR STRIP: 6 [PH] (ref 5–8)
PROT UR QL STRIP: NEGATIVE
SP GR UR STRIP: >=1.03 (ref 1–1.03)
SQUAMOUS #/AREA URNS HPF: 3 /HPF
URN SPEC COLLECT METH UR: ABNORMAL
UROBILINOGEN UR STRIP-ACNC: NEGATIVE EU/DL
WBC #/AREA URNS HPF: 2 /HPF (ref 0–5)

## 2021-08-22 PROCEDURE — 81000 URINALYSIS NONAUTO W/SCOPE: CPT | Performed by: FAMILY MEDICINE

## 2021-08-22 PROCEDURE — 25000003 PHARM REV CODE 250: Performed by: FAMILY MEDICINE

## 2021-08-22 PROCEDURE — 81025 URINE PREGNANCY TEST: CPT | Performed by: FAMILY MEDICINE

## 2021-08-22 RX ORDER — MELOXICAM 15 MG/1
15 TABLET ORAL DAILY
Qty: 14 TABLET | Refills: 0 | Status: SHIPPED | OUTPATIENT
Start: 2021-08-22 | End: 2022-11-23

## 2021-08-22 RX ORDER — HYDROCODONE BITARTRATE AND ACETAMINOPHEN 5; 325 MG/1; MG/1
1 TABLET ORAL
Status: COMPLETED | OUTPATIENT
Start: 2021-08-22 | End: 2021-08-22

## 2021-08-22 RX ORDER — AMOXICILLIN 250 MG/1
500 CAPSULE ORAL
Status: COMPLETED | OUTPATIENT
Start: 2021-08-22 | End: 2021-08-22

## 2021-08-22 RX ORDER — AMOXICILLIN 500 MG/1
500 CAPSULE ORAL 3 TIMES DAILY
Qty: 21 CAPSULE | Refills: 0 | Status: SHIPPED | OUTPATIENT
Start: 2021-08-22 | End: 2021-08-26

## 2021-08-22 RX ADMIN — AMOXICILLIN 500 MG: 250 CAPSULE ORAL at 02:08

## 2021-08-22 RX ADMIN — HYDROCODONE BITARTRATE AND ACETAMINOPHEN 1 TABLET: 5; 325 TABLET ORAL at 02:08

## 2022-06-18 NOTE — ANESTHESIA PROCEDURE NOTES
Intubation  Performed by: Alexandra Lua CRNA  Authorized by: Darrick Crowder MD     Intubation:     Induction:  Intravenous    Intubated:  Postinduction    Mask Ventilation:  Easy mask    Attempts:  1    Attempted By:  CRNA    Method of Intubation:  Direct    Blade:  Thakkar 2    Laryngeal View Grade: Grade I - full view of chords      Difficult Airway Encountered?: No      Complications:  None    Airway Device:  Oral endotracheal tube    Airway Device Size:  7.0    Style/Cuff Inflation:  Cuffed (inflated to minimal occlusive pressure)    Tube secured:  22    Secured at:  The lips    Placement Verified By:  Capnometry    Complicating Factors:  Large/floppy epiglottis, obesity, short neck and oropharyngeal edema or fat        
yes

## 2022-09-01 NOTE — SUBJECTIVE & OBJECTIVE
"Principal Problem:Lumbar disc herniation    Principal Orthopedic Problem: S/P multilevel lumbar lami    Interval History: Denies N/V and HA with sitting upright    Review of patient's allergies indicates:  No Known Allergies    Current Facility-Administered Medications   Medication    0.9%  NaCl infusion    acetaminophen tablet 650 mg    aluminum-magnesium hydroxide-simethicone 200-200-20 mg/5 mL suspension 30 mL    bisacodyL suppository 10 mg    diphenhydrAMINE capsule 50 mg    docusate sodium capsule 100 mg    electrolyte-S (ISOLYTE)    ethynodiol-ethinyl estradiol 1-50 mg-mcg per tablet 1 tablet    FLUoxetine capsule 20 mg    gabapentin capsule 300 mg    hydromorphone (PF) injection 2 mg    HYDROmorphone PCA syringe 6 mg/30 mL (0.2 mg/mL) NS    methocarbamoL tablet 500 mg    naloxone 0.4 mg/mL injection 0.02 mg    ondansetron disintegrating tablet 8 mg    oxyCODONE immediate release tablet 15 mg    oxyCODONE immediate release tablet 5 mg    oxyCODONE immediate release tablet Tab 10 mg    promethazine (PHENERGAN) 12.5 mg in dextrose 5 % 50 mL IVPB    senna-docusate 8.6-50 mg per tablet 2 tablet     Objective:     Vital Signs (Most Recent):  Temp: 99.7 °F (37.6 °C) (08/13/20 0747)  Pulse: 101 (08/13/20 0904)  Resp: 16 (08/13/20 0904)  BP: (!) 114/55 (08/13/20 0904)  SpO2: 95 % (08/13/20 0904) Vital Signs (24h Range):  Temp:  [96.8 °F (36 °C)-102.3 °F (39.1 °C)] 99.7 °F (37.6 °C)  Pulse:  [] 101  Resp:  [14-20] 16  SpO2:  [91 %-99 %] 95 %  BP: ()/(50-58) 114/55     Weight: 111.1 kg (245 lb)  Height: 5' 6" (167.6 cm)  Body mass index is 39.54 kg/m².      Intake/Output Summary (Last 24 hours) at 8/13/2020 1011  Last data filed at 8/13/2020 0600  Gross per 24 hour   Intake 1671.67 ml   Output 2650 ml   Net -978.33 ml       General    Nursing note and vitals reviewed.  Constitutional: She is oriented to person, place, and time.   obese   Pulmonary/Chest: Effort normal.   Neurological: She " Weekly Wound Education Note    Teaching Provided To: Patient  Training Topics: Cleasing and general instructions;Dressing; Discharge instructions; Off-loading  Training Method: Explain/Verbal  Training Response: Patient responds and understands        Notes: Stable. Continue mupriocin, plain alginate, bordered gauze, applied silver alginate, bordered gauze in clinic today. is alert and oriented to person, place, and time.   Psychiatric: She has a normal mood and affect. Her behavior is normal.   Flat affect         Back (L-Spine & T-Spine) / Neck (C-Spine) Exam     Comments:  Incision/ dressing C/D/I. Barron in place.        Significant Labs:   CBC:   Recent Labs   Lab 08/11/20  1302 08/12/20  0509 08/13/20  0459   WBC 12.83* 11.65 13.83*   HGB 10.2* 10.1* 10.3*   HCT 31.8* 32.1* 33.5*    282 345     CMP:   Recent Labs   Lab 08/11/20  1302 08/12/20  0509 08/13/20  0459    139 139   K 4.1 3.9 3.5    104 103   CO2 23 24 25   * 100 104   BUN 7 5* 8   CREATININE 0.6 0.6 0.8   CALCIUM 8.0* 8.7 8.7   ANIONGAP 10 11 11   EGFRNONAA >60 >60 >60     All pertinent labs within the past 24 hours have been reviewed.    Significant Imaging: None

## 2022-11-23 ENCOUNTER — OFFICE VISIT (OUTPATIENT)
Dept: FAMILY MEDICINE | Facility: CLINIC | Age: 22
End: 2022-11-23
Payer: COMMERCIAL

## 2022-11-23 ENCOUNTER — LAB VISIT (OUTPATIENT)
Dept: LAB | Facility: HOSPITAL | Age: 22
End: 2022-11-23
Attending: FAMILY MEDICINE
Payer: COMMERCIAL

## 2022-11-23 VITALS
BODY MASS INDEX: 38.72 KG/M2 | DIASTOLIC BLOOD PRESSURE: 88 MMHG | HEART RATE: 84 BPM | WEIGHT: 246.69 LBS | HEIGHT: 67 IN | OXYGEN SATURATION: 99 % | SYSTOLIC BLOOD PRESSURE: 130 MMHG

## 2022-11-23 DIAGNOSIS — E28.2 PCOS (POLYCYSTIC OVARIAN SYNDROME): Primary | ICD-10-CM

## 2022-11-23 DIAGNOSIS — Z13.220 ENCOUNTER FOR LIPID SCREENING FOR CARDIOVASCULAR DISEASE: ICD-10-CM

## 2022-11-23 DIAGNOSIS — I10 ESSENTIAL HYPERTENSION: ICD-10-CM

## 2022-11-23 DIAGNOSIS — F41.1 GAD (GENERALIZED ANXIETY DISORDER): ICD-10-CM

## 2022-11-23 DIAGNOSIS — Z13.6 ENCOUNTER FOR LIPID SCREENING FOR CARDIOVASCULAR DISEASE: ICD-10-CM

## 2022-11-23 DIAGNOSIS — E28.2 PCOS (POLYCYSTIC OVARIAN SYNDROME): ICD-10-CM

## 2022-11-23 DIAGNOSIS — Z11.59 NEED FOR HEPATITIS C SCREENING TEST: ICD-10-CM

## 2022-11-23 LAB
ALBUMIN SERPL BCP-MCNC: 4.2 G/DL (ref 3.5–5.2)
ALP SERPL-CCNC: 68 U/L (ref 55–135)
ALT SERPL W/O P-5'-P-CCNC: 24 U/L (ref 10–44)
ANION GAP SERPL CALC-SCNC: 12 MMOL/L (ref 8–16)
AST SERPL-CCNC: 17 U/L (ref 10–40)
BASOPHILS # BLD AUTO: 0.05 K/UL (ref 0–0.2)
BASOPHILS NFR BLD: 0.6 % (ref 0–1.9)
BILIRUB SERPL-MCNC: 0.3 MG/DL (ref 0.1–1)
BUN SERPL-MCNC: 10 MG/DL (ref 6–20)
CALCIUM SERPL-MCNC: 9.6 MG/DL (ref 8.7–10.5)
CHLORIDE SERPL-SCNC: 106 MMOL/L (ref 95–110)
CHOLEST SERPL-MCNC: 184 MG/DL (ref 120–199)
CHOLEST/HDLC SERPL: 4.5 {RATIO} (ref 2–5)
CO2 SERPL-SCNC: 22 MMOL/L (ref 23–29)
CREAT SERPL-MCNC: 0.7 MG/DL (ref 0.5–1.4)
DIFFERENTIAL METHOD: ABNORMAL
EOSINOPHIL # BLD AUTO: 0.2 K/UL (ref 0–0.5)
EOSINOPHIL NFR BLD: 1.8 % (ref 0–8)
ERYTHROCYTE [DISTWIDTH] IN BLOOD BY AUTOMATED COUNT: 13.5 % (ref 11.5–14.5)
EST. GFR  (NO RACE VARIABLE): >60 ML/MIN/1.73 M^2
ESTIMATED AVG GLUCOSE: 105 MG/DL (ref 68–131)
GLUCOSE SERPL-MCNC: 101 MG/DL (ref 70–110)
HBA1C MFR BLD: 5.3 % (ref 4–5.6)
HCT VFR BLD AUTO: 38.6 % (ref 37–48.5)
HDLC SERPL-MCNC: 41 MG/DL (ref 40–75)
HDLC SERPL: 22.3 % (ref 20–50)
HGB BLD-MCNC: 12.5 G/DL (ref 12–16)
IMM GRANULOCYTES # BLD AUTO: 0.02 K/UL (ref 0–0.04)
IMM GRANULOCYTES NFR BLD AUTO: 0.2 % (ref 0–0.5)
LDLC SERPL CALC-MCNC: 82.6 MG/DL (ref 63–159)
LYMPHOCYTES # BLD AUTO: 2.6 K/UL (ref 1–4.8)
LYMPHOCYTES NFR BLD: 31.3 % (ref 18–48)
MCH RBC QN AUTO: 25.7 PG (ref 27–31)
MCHC RBC AUTO-ENTMCNC: 32.4 G/DL (ref 32–36)
MCV RBC AUTO: 79 FL (ref 82–98)
MONOCYTES # BLD AUTO: 0.4 K/UL (ref 0.3–1)
MONOCYTES NFR BLD: 4.3 % (ref 4–15)
NEUTROPHILS # BLD AUTO: 5.1 K/UL (ref 1.8–7.7)
NEUTROPHILS NFR BLD: 61.8 % (ref 38–73)
NONHDLC SERPL-MCNC: 143 MG/DL
NRBC BLD-RTO: 0 /100 WBC
PLATELET # BLD AUTO: 305 K/UL (ref 150–450)
PMV BLD AUTO: 10.6 FL (ref 9.2–12.9)
POTASSIUM SERPL-SCNC: 4.2 MMOL/L (ref 3.5–5.1)
PROT SERPL-MCNC: 7.2 G/DL (ref 6–8.4)
RBC # BLD AUTO: 4.86 M/UL (ref 4–5.4)
SODIUM SERPL-SCNC: 140 MMOL/L (ref 136–145)
TRIGL SERPL-MCNC: 302 MG/DL (ref 30–150)
WBC # BLD AUTO: 8.2 K/UL (ref 3.9–12.7)

## 2022-11-23 PROCEDURE — 3075F PR MOST RECENT SYSTOLIC BLOOD PRESS GE 130-139MM HG: ICD-10-PCS | Mod: CPTII,S$GLB,, | Performed by: FAMILY MEDICINE

## 2022-11-23 PROCEDURE — 83036 HEMOGLOBIN GLYCOSYLATED A1C: CPT | Performed by: FAMILY MEDICINE

## 2022-11-23 PROCEDURE — 3079F PR MOST RECENT DIASTOLIC BLOOD PRESSURE 80-89 MM HG: ICD-10-PCS | Mod: CPTII,S$GLB,, | Performed by: FAMILY MEDICINE

## 2022-11-23 PROCEDURE — 1159F PR MEDICATION LIST DOCUMENTED IN MEDICAL RECORD: ICD-10-PCS | Mod: CPTII,S$GLB,, | Performed by: FAMILY MEDICINE

## 2022-11-23 PROCEDURE — 3075F SYST BP GE 130 - 139MM HG: CPT | Mod: CPTII,S$GLB,, | Performed by: FAMILY MEDICINE

## 2022-11-23 PROCEDURE — 99999 PR PBB SHADOW E&M-EST. PATIENT-LVL III: CPT | Mod: PBBFAC,,, | Performed by: FAMILY MEDICINE

## 2022-11-23 PROCEDURE — 3008F PR BODY MASS INDEX (BMI) DOCUMENTED: ICD-10-PCS | Mod: CPTII,S$GLB,, | Performed by: FAMILY MEDICINE

## 2022-11-23 PROCEDURE — 36415 COLL VENOUS BLD VENIPUNCTURE: CPT | Performed by: FAMILY MEDICINE

## 2022-11-23 PROCEDURE — 80053 COMPREHEN METABOLIC PANEL: CPT | Performed by: FAMILY MEDICINE

## 2022-11-23 PROCEDURE — 3008F BODY MASS INDEX DOCD: CPT | Mod: CPTII,S$GLB,, | Performed by: FAMILY MEDICINE

## 2022-11-23 PROCEDURE — 99214 PR OFFICE/OUTPT VISIT, EST, LEVL IV, 30-39 MIN: ICD-10-PCS | Mod: S$GLB,,, | Performed by: FAMILY MEDICINE

## 2022-11-23 PROCEDURE — 80061 LIPID PANEL: CPT | Performed by: FAMILY MEDICINE

## 2022-11-23 PROCEDURE — 1160F PR REVIEW ALL MEDS BY PRESCRIBER/CLIN PHARMACIST DOCUMENTED: ICD-10-PCS | Mod: CPTII,S$GLB,, | Performed by: FAMILY MEDICINE

## 2022-11-23 PROCEDURE — 1160F RVW MEDS BY RX/DR IN RCRD: CPT | Mod: CPTII,S$GLB,, | Performed by: FAMILY MEDICINE

## 2022-11-23 PROCEDURE — 1159F MED LIST DOCD IN RCRD: CPT | Mod: CPTII,S$GLB,, | Performed by: FAMILY MEDICINE

## 2022-11-23 PROCEDURE — 3079F DIAST BP 80-89 MM HG: CPT | Mod: CPTII,S$GLB,, | Performed by: FAMILY MEDICINE

## 2022-11-23 PROCEDURE — 85025 COMPLETE CBC W/AUTO DIFF WBC: CPT | Performed by: FAMILY MEDICINE

## 2022-11-23 PROCEDURE — 99214 OFFICE O/P EST MOD 30 MIN: CPT | Mod: S$GLB,,, | Performed by: FAMILY MEDICINE

## 2022-11-23 PROCEDURE — 99999 PR PBB SHADOW E&M-EST. PATIENT-LVL III: ICD-10-PCS | Mod: PBBFAC,,, | Performed by: FAMILY MEDICINE

## 2022-11-23 RX ORDER — NORGESTIMATE AND ETHINYL ESTRADIOL 0.25-0.035
1 KIT ORAL DAILY
Qty: 30 TABLET | Refills: 11 | Status: SHIPPED | OUTPATIENT
Start: 2022-11-23 | End: 2023-10-23

## 2022-11-23 RX ORDER — FLUOXETINE 10 MG/1
10 CAPSULE ORAL DAILY
Qty: 30 CAPSULE | Refills: 11 | Status: SHIPPED | OUTPATIENT
Start: 2022-11-23 | End: 2022-12-07

## 2022-11-23 NOTE — PROGRESS NOTES
Subjective:       Patient ID: Aysha Waters is a 22 y.o. female.    Chief Complaint: Establish Care (Pt would like to discuss if there is something other than Metformin that she can take for her PCOS. Pt would like to discuss restarting Fluoxetine. Pt states she use to take lisinopril and HCTZ  for BP and states she will restart if needed. Pt consents to scheduled Pap with Dr Diaz at later date. ) and Medication Refill    PCOS: Irregular cycles. Once every three to four months.     Hypertension: She was on blood pressure medications for about a year or two. No plans for pregnancy in the near future. She is not on OCPs.     Depression/Anxiety: Was taking prozac for about 3-4 years. Stopped taking it as she was feeling better. Anxiety symptoms flared and the worse. Sometimes she will have some depressive symptoms. Every couple of months. Not the predominate ones. Has not wanted to cause harm to herself or someone else.         Review of Systems   Constitutional:  Negative for activity change, appetite change, fatigue and fever.   Respiratory:  Negative for shortness of breath.    Gastrointestinal:  Negative for abdominal pain.   Integumentary:  Negative for rash.   Psychiatric/Behavioral:  Positive for dysphoric mood. The patient is nervous/anxious.        Objective:      Physical Exam  Vitals and nursing note reviewed.   Constitutional:       General: She is not in acute distress.     Appearance: She is obese. She is not ill-appearing.   Cardiovascular:      Rate and Rhythm: Normal rate and regular rhythm.      Heart sounds: No murmur heard.  Pulmonary:      Effort: Pulmonary effort is normal.      Breath sounds: Normal breath sounds. No wheezing.   Skin:     General: Skin is warm and dry.      Findings: No rash.   Neurological:      Mental Status: She is alert.   Psychiatric:         Mood and Affect: Mood normal.         Behavior: Behavior normal.       Assessment:       1. PCOS (polycystic ovarian syndrome)     2. Encounter for lipid screening for cardiovascular disease    3. Essential hypertension    4. LLUVIA (generalized anxiety disorder)        Plan:       Problem List Items Addressed This Visit          Cardiac/Vascular    Essential hypertension    Relevant Orders    CBC Auto Differential    Comprehensive Metabolic Panel     Other Visit Diagnoses       PCOS (polycystic ovarian syndrome)    -  Primary    Relevant Medications    norgestimate-ethinyl estradioL (ORTHO-CYCLEN) 0.25-35 mg-mcg per tablet    Other Relevant Orders    Hemoglobin A1C    Encounter for lipid screening for cardiovascular disease        Relevant Orders    Lipid Panel    LLUVIA (generalized anxiety disorder)        Relevant Medications    FLUoxetine 10 MG capsule              Labs as above.   Starting OCPs for cycle regulation for PCOS. Discussed healthy diet and exercise as well as weight loss.   Starting fluoxetine for the LLUVIA. Shehas been in this before. Suspect we will need to titrate up. Close follow up in 2 weeks with plan for titration.   Labs to check for risks related to PCOS.

## 2022-12-07 ENCOUNTER — OFFICE VISIT (OUTPATIENT)
Dept: FAMILY MEDICINE | Facility: CLINIC | Age: 22
End: 2022-12-07
Payer: COMMERCIAL

## 2022-12-07 DIAGNOSIS — F41.1 GAD (GENERALIZED ANXIETY DISORDER): ICD-10-CM

## 2022-12-07 PROCEDURE — 1160F PR REVIEW ALL MEDS BY PRESCRIBER/CLIN PHARMACIST DOCUMENTED: ICD-10-PCS | Mod: CPTII,95,, | Performed by: FAMILY MEDICINE

## 2022-12-07 PROCEDURE — 1159F PR MEDICATION LIST DOCUMENTED IN MEDICAL RECORD: ICD-10-PCS | Mod: CPTII,95,, | Performed by: FAMILY MEDICINE

## 2022-12-07 PROCEDURE — 3044F HG A1C LEVEL LT 7.0%: CPT | Mod: CPTII,95,, | Performed by: FAMILY MEDICINE

## 2022-12-07 PROCEDURE — 3044F PR MOST RECENT HEMOGLOBIN A1C LEVEL <7.0%: ICD-10-PCS | Mod: CPTII,95,, | Performed by: FAMILY MEDICINE

## 2022-12-07 PROCEDURE — 1160F RVW MEDS BY RX/DR IN RCRD: CPT | Mod: CPTII,95,, | Performed by: FAMILY MEDICINE

## 2022-12-07 PROCEDURE — 99213 OFFICE O/P EST LOW 20 MIN: CPT | Mod: 95,,, | Performed by: FAMILY MEDICINE

## 2022-12-07 PROCEDURE — 1159F MED LIST DOCD IN RCRD: CPT | Mod: CPTII,95,, | Performed by: FAMILY MEDICINE

## 2022-12-07 PROCEDURE — 99213 PR OFFICE/OUTPT VISIT, EST, LEVL III, 20-29 MIN: ICD-10-PCS | Mod: 95,,, | Performed by: FAMILY MEDICINE

## 2022-12-07 RX ORDER — FLUOXETINE HYDROCHLORIDE 20 MG/1
20 CAPSULE ORAL DAILY
Qty: 30 CAPSULE | Refills: 11 | Status: SHIPPED | OUTPATIENT
Start: 2022-12-07 | End: 2023-12-20

## 2022-12-07 NOTE — PATIENT INSTRUCTIONS
Thank you for allowing me to participate in your care today. It is an honor to be a part of your healthcare team at [facility]. If you had labs ordered today, you will receive notification via [employer], phone call or mailed letter regarding your results within 7 days. If you have any questions or concerns regarding your visit today, please do not hesitate to contact us.  Sincerely,   Arelis Diaz M.D.

## 2022-12-07 NOTE — PROGRESS NOTES
The patient location is: [place]  The chief complaint leading to consultation is: follow up anxiety and labs    Visit type: audiovisual    Face to Face time with patient: 5 minutes  8 minutes of total time spent on the encounter, which includes face to face time and non-face to face time preparing to see the patient (eg, review of tests), Obtaining and/or reviewing separately obtained history, Documenting clinical information in the electronic or other health record, Independently interpreting results (not separately reported) and communicating results to the patient/family/caregiver, or Care coordination (not separately reported).         Each patient to whom he or she provides medical services by telemedicine is:  (1) informed of the relationship between the physician and patient and the respective role of any other health care provider with respect to management of the patient; and (2) notified that he or she may decline to receive medical services by telemedicine and may withdraw from such care at any time.    Notes:  Subjective:       Patient ID: Aysha Waters is a 22 y.o. female.    Chief Complaint: No chief complaint on file.      Improvement in anxiety  No problems on birth control.  Labs reviewed and improved from a year ago with lifestyle changes    Would like to increase fluoxetine.     Review of Systems   Constitutional:  Negative for activity change and unexpected weight change.   HENT:  Negative for hearing loss, rhinorrhea and trouble swallowing.    Eyes:  Negative for discharge and visual disturbance.   Respiratory:  Negative for chest tightness and wheezing.    Cardiovascular:  Negative for chest pain and palpitations.   Gastrointestinal:  Negative for blood in stool, constipation, diarrhea and vomiting.   Endocrine: Negative for polydipsia and polyuria.   Genitourinary:  Negative for difficulty urinating, dysuria, hematuria and menstrual problem.   Musculoskeletal:  Negative for arthralgias,  joint swelling and neck pain.   Neurological:  Negative for weakness and headaches.   Psychiatric/Behavioral:  Negative for confusion and dysphoric mood.        Objective:      Physical Exam  Constitutional:       General: She is not in acute distress.     Appearance: Normal appearance. She is not ill-appearing.   Pulmonary:      Effort: Pulmonary effort is normal. No respiratory distress.   Neurological:      Mental Status: She is alert.   Psychiatric:         Mood and Affect: Mood normal.         Behavior: Behavior normal.         Thought Content: Thought content normal.         Judgment: Judgment normal.       Assessment:       1. LLUVIA (generalized anxiety disorder)          Plan:       Problem List Items Addressed This Visit    None  Visit Diagnoses       LLUVIA (generalized anxiety disorder)        Relevant Medications    FLUoxetine 20 MG capsule             Increasing fluoxetine to 20mg. Followup 1-2 months virtual

## 2022-12-21 ENCOUNTER — PATIENT MESSAGE (OUTPATIENT)
Dept: FAMILY MEDICINE | Facility: CLINIC | Age: 22
End: 2022-12-21
Payer: COMMERCIAL

## 2023-02-03 ENCOUNTER — OFFICE VISIT (OUTPATIENT)
Dept: FAMILY MEDICINE | Facility: CLINIC | Age: 23
End: 2023-02-03
Payer: COMMERCIAL

## 2023-02-03 VITALS
SYSTOLIC BLOOD PRESSURE: 144 MMHG | OXYGEN SATURATION: 99 % | HEIGHT: 67 IN | WEIGHT: 244.81 LBS | HEART RATE: 95 BPM | DIASTOLIC BLOOD PRESSURE: 88 MMHG | BODY MASS INDEX: 38.42 KG/M2

## 2023-02-03 DIAGNOSIS — M25.512 STERNOCLAVICULAR JOINT PAIN, LEFT: Primary | ICD-10-CM

## 2023-02-03 DIAGNOSIS — I10 ESSENTIAL HYPERTENSION: ICD-10-CM

## 2023-02-03 PROCEDURE — 3079F PR MOST RECENT DIASTOLIC BLOOD PRESSURE 80-89 MM HG: ICD-10-PCS | Mod: CPTII,S$GLB,, | Performed by: FAMILY MEDICINE

## 2023-02-03 PROCEDURE — 1160F RVW MEDS BY RX/DR IN RCRD: CPT | Mod: CPTII,S$GLB,, | Performed by: FAMILY MEDICINE

## 2023-02-03 PROCEDURE — 3008F BODY MASS INDEX DOCD: CPT | Mod: CPTII,S$GLB,, | Performed by: FAMILY MEDICINE

## 2023-02-03 PROCEDURE — 99213 OFFICE O/P EST LOW 20 MIN: CPT | Mod: S$GLB,,, | Performed by: FAMILY MEDICINE

## 2023-02-03 PROCEDURE — 3008F PR BODY MASS INDEX (BMI) DOCUMENTED: ICD-10-PCS | Mod: CPTII,S$GLB,, | Performed by: FAMILY MEDICINE

## 2023-02-03 PROCEDURE — 3079F DIAST BP 80-89 MM HG: CPT | Mod: CPTII,S$GLB,, | Performed by: FAMILY MEDICINE

## 2023-02-03 PROCEDURE — 1159F MED LIST DOCD IN RCRD: CPT | Mod: CPTII,S$GLB,, | Performed by: FAMILY MEDICINE

## 2023-02-03 PROCEDURE — 99213 PR OFFICE/OUTPT VISIT, EST, LEVL III, 20-29 MIN: ICD-10-PCS | Mod: S$GLB,,, | Performed by: FAMILY MEDICINE

## 2023-02-03 PROCEDURE — 3077F SYST BP >= 140 MM HG: CPT | Mod: CPTII,S$GLB,, | Performed by: FAMILY MEDICINE

## 2023-02-03 PROCEDURE — 1159F PR MEDICATION LIST DOCUMENTED IN MEDICAL RECORD: ICD-10-PCS | Mod: CPTII,S$GLB,, | Performed by: FAMILY MEDICINE

## 2023-02-03 PROCEDURE — 99999 PR PBB SHADOW E&M-EST. PATIENT-LVL IV: ICD-10-PCS | Mod: PBBFAC,,, | Performed by: FAMILY MEDICINE

## 2023-02-03 PROCEDURE — 1160F PR REVIEW ALL MEDS BY PRESCRIBER/CLIN PHARMACIST DOCUMENTED: ICD-10-PCS | Mod: CPTII,S$GLB,, | Performed by: FAMILY MEDICINE

## 2023-02-03 PROCEDURE — 99999 PR PBB SHADOW E&M-EST. PATIENT-LVL IV: CPT | Mod: PBBFAC,,, | Performed by: FAMILY MEDICINE

## 2023-02-03 PROCEDURE — 3077F PR MOST RECENT SYSTOLIC BLOOD PRESSURE >= 140 MM HG: ICD-10-PCS | Mod: CPTII,S$GLB,, | Performed by: FAMILY MEDICINE

## 2023-02-03 RX ORDER — IBUPROFEN 800 MG/1
800 TABLET ORAL 3 TIMES DAILY
Qty: 63 TABLET | Refills: 0 | Status: SHIPPED | OUTPATIENT
Start: 2023-02-03 | End: 2023-02-24

## 2023-02-03 NOTE — ASSESSMENT & PLAN NOTE
Chronic. Previously well controlled off of medication but elevated today. We did re-start OCPs in the fall. Recheck at end of visit still elevated. She has an appt with me Monday for pap so we will check again then.

## 2023-02-03 NOTE — PROGRESS NOTES
Subjective:       Patient ID: Aysha Waters is a 22 y.o. female.    Chief Complaint: Shoulder Pain (Left shoulder. Pt states symptoms started about 3 months ago. Pt states this is located in collar bone area. Pt has trouble with doing any task due to the pain. )    Left shoulder pain.   2 months.   Originally would come and go.   Now more persistent.       Review of Systems   Musculoskeletal:  Positive for arthralgias (left shoulder).       Objective:      Physical Exam  Constitutional:       General: She is not in acute distress.     Appearance: Normal appearance. She is not ill-appearing.   Pulmonary:      Effort: Pulmonary effort is normal. No respiratory distress.   Musculoskeletal:        Arms:    Neurological:      Mental Status: She is alert.   Psychiatric:         Mood and Affect: Mood normal.         Behavior: Behavior normal.         Thought Content: Thought content normal.         Judgment: Judgment normal.       Assessment:       1. Sternoclavicular joint pain, left    2. Essential hypertension        Plan:       Problem List Items Addressed This Visit          Cardiac/Vascular    Essential hypertension     Chronic. Previously well controlled off of medication but elevated today. We did re-start OCPs in the fall. Recheck at end of visit still elevated. She has an appt with me Monday for pap so we will check again then.           Other Visit Diagnoses       Sternoclavicular joint pain, left    -  Primary    Relevant Medications    ibuprofen (ADVIL,MOTRIN) 800 MG tablet

## 2023-02-06 ENCOUNTER — OFFICE VISIT (OUTPATIENT)
Dept: FAMILY MEDICINE | Facility: CLINIC | Age: 23
End: 2023-02-06
Payer: COMMERCIAL

## 2023-02-06 VITALS
HEART RATE: 93 BPM | WEIGHT: 246.19 LBS | HEIGHT: 67 IN | OXYGEN SATURATION: 100 % | BODY MASS INDEX: 38.64 KG/M2 | DIASTOLIC BLOOD PRESSURE: 86 MMHG | SYSTOLIC BLOOD PRESSURE: 138 MMHG

## 2023-02-06 DIAGNOSIS — Z12.4 SCREENING FOR CERVICAL CANCER: Primary | ICD-10-CM

## 2023-02-06 PROCEDURE — 3079F PR MOST RECENT DIASTOLIC BLOOD PRESSURE 80-89 MM HG: ICD-10-PCS | Mod: CPTII,S$GLB,, | Performed by: FAMILY MEDICINE

## 2023-02-06 PROCEDURE — 99395 PR PREVENTIVE VISIT,EST,18-39: ICD-10-PCS | Mod: S$GLB,,, | Performed by: FAMILY MEDICINE

## 2023-02-06 PROCEDURE — 3075F PR MOST RECENT SYSTOLIC BLOOD PRESS GE 130-139MM HG: ICD-10-PCS | Mod: CPTII,S$GLB,, | Performed by: FAMILY MEDICINE

## 2023-02-06 PROCEDURE — 87624 HPV HI-RISK TYP POOLED RSLT: CPT | Performed by: FAMILY MEDICINE

## 2023-02-06 PROCEDURE — 99999 PR PBB SHADOW E&M-EST. PATIENT-LVL III: CPT | Mod: PBBFAC,,, | Performed by: FAMILY MEDICINE

## 2023-02-06 PROCEDURE — 1159F MED LIST DOCD IN RCRD: CPT | Mod: CPTII,S$GLB,, | Performed by: FAMILY MEDICINE

## 2023-02-06 PROCEDURE — 88175 CYTOPATH C/V AUTO FLUID REDO: CPT | Performed by: FAMILY MEDICINE

## 2023-02-06 PROCEDURE — 99395 PREV VISIT EST AGE 18-39: CPT | Mod: S$GLB,,, | Performed by: FAMILY MEDICINE

## 2023-02-06 PROCEDURE — 99999 PR PBB SHADOW E&M-EST. PATIENT-LVL III: ICD-10-PCS | Mod: PBBFAC,,, | Performed by: FAMILY MEDICINE

## 2023-02-06 PROCEDURE — 3008F BODY MASS INDEX DOCD: CPT | Mod: CPTII,S$GLB,, | Performed by: FAMILY MEDICINE

## 2023-02-06 PROCEDURE — 3075F SYST BP GE 130 - 139MM HG: CPT | Mod: CPTII,S$GLB,, | Performed by: FAMILY MEDICINE

## 2023-02-06 PROCEDURE — 3079F DIAST BP 80-89 MM HG: CPT | Mod: CPTII,S$GLB,, | Performed by: FAMILY MEDICINE

## 2023-02-06 PROCEDURE — 1159F PR MEDICATION LIST DOCUMENTED IN MEDICAL RECORD: ICD-10-PCS | Mod: CPTII,S$GLB,, | Performed by: FAMILY MEDICINE

## 2023-02-06 PROCEDURE — 3008F PR BODY MASS INDEX (BMI) DOCUMENTED: ICD-10-PCS | Mod: CPTII,S$GLB,, | Performed by: FAMILY MEDICINE

## 2023-02-06 NOTE — PROGRESS NOTES
"Subjective:       Aysha Waters is a 22 y.o. woman who comes in today for a  pap smear only. Her most recent annual exam was unknown. Her most recent Pap smear was unknown and showed no abnormalities. Previous abnormal Pap smears: no. Contraception: OCP (estrogen/progesterone)    The following portions of the patient's history were reviewed and updated as appropriate: allergies, current medications, past family history, past medical history, past social history, past surgical history, and problem list.    Review of Systems  Pertinent items are noted in HPI.     Objective:      /86 (BP Location: Right arm, Patient Position: Sitting, BP Method: Large (Manual))   Pulse 93   Ht 5' 7" (1.702 m)   Wt 111.7 kg (246 lb 3.2 oz)   LMP 01/16/2023 (Exact Date)   SpO2 100%   BMI 38.56 kg/m²   Breast exam: No masses. No LAD. No nipple discharge. No skin changes. Bilaterally.   Pelvic Exam: cervix normal in appearance, external genitalia normal, and vagina normal without discharge. Pap smear obtained.     Assessment:      Screening pap smear.     Plan:      Follow up in 1 year, or as indicated by Pap results.     "

## 2023-02-10 LAB
FINAL PATHOLOGIC DIAGNOSIS: NORMAL
HPV HR 12 DNA SPEC QL NAA+PROBE: NEGATIVE
HPV16 AG SPEC QL: NEGATIVE
HPV18 DNA SPEC QL NAA+PROBE: NEGATIVE
Lab: NORMAL

## 2023-02-24 ENCOUNTER — OFFICE VISIT (OUTPATIENT)
Dept: URGENT CARE | Facility: CLINIC | Age: 23
End: 2023-02-24
Payer: COMMERCIAL

## 2023-02-24 VITALS
SYSTOLIC BLOOD PRESSURE: 148 MMHG | DIASTOLIC BLOOD PRESSURE: 90 MMHG | HEIGHT: 66 IN | OXYGEN SATURATION: 98 % | BODY MASS INDEX: 38.57 KG/M2 | TEMPERATURE: 99 F | HEART RATE: 92 BPM | WEIGHT: 240 LBS

## 2023-02-24 DIAGNOSIS — J06.9 BACTERIAL UPPER RESPIRATORY INFECTION: Primary | ICD-10-CM

## 2023-02-24 DIAGNOSIS — B96.89 BACTERIAL UPPER RESPIRATORY INFECTION: Primary | ICD-10-CM

## 2023-02-24 DIAGNOSIS — R09.81 HEAD CONGESTION: ICD-10-CM

## 2023-02-24 PROCEDURE — 3077F SYST BP >= 140 MM HG: CPT | Mod: CPTII,S$GLB,,

## 2023-02-24 PROCEDURE — 1159F MED LIST DOCD IN RCRD: CPT | Mod: CPTII,S$GLB,,

## 2023-02-24 PROCEDURE — 3008F BODY MASS INDEX DOCD: CPT | Mod: CPTII,S$GLB,,

## 2023-02-24 PROCEDURE — 1159F PR MEDICATION LIST DOCUMENTED IN MEDICAL RECORD: ICD-10-PCS | Mod: CPTII,S$GLB,,

## 2023-02-24 PROCEDURE — 3008F PR BODY MASS INDEX (BMI) DOCUMENTED: ICD-10-PCS | Mod: CPTII,S$GLB,,

## 2023-02-24 PROCEDURE — 3080F DIAST BP >= 90 MM HG: CPT | Mod: CPTII,S$GLB,,

## 2023-02-24 PROCEDURE — 3080F PR MOST RECENT DIASTOLIC BLOOD PRESSURE >= 90 MM HG: ICD-10-PCS | Mod: CPTII,S$GLB,,

## 2023-02-24 PROCEDURE — 1160F RVW MEDS BY RX/DR IN RCRD: CPT | Mod: CPTII,S$GLB,,

## 2023-02-24 PROCEDURE — 99214 PR OFFICE/OUTPT VISIT, EST, LEVL IV, 30-39 MIN: ICD-10-PCS | Mod: S$GLB,,,

## 2023-02-24 PROCEDURE — 3077F PR MOST RECENT SYSTOLIC BLOOD PRESSURE >= 140 MM HG: ICD-10-PCS | Mod: CPTII,S$GLB,,

## 2023-02-24 PROCEDURE — 99214 OFFICE O/P EST MOD 30 MIN: CPT | Mod: S$GLB,,,

## 2023-02-24 PROCEDURE — 1160F PR REVIEW ALL MEDS BY PRESCRIBER/CLIN PHARMACIST DOCUMENTED: ICD-10-PCS | Mod: CPTII,S$GLB,,

## 2023-02-24 RX ORDER — AZITHROMYCIN 250 MG/1
TABLET, FILM COATED ORAL
Qty: 6 TABLET | Refills: 0 | Status: SHIPPED | OUTPATIENT
Start: 2023-02-24 | End: 2023-03-01

## 2023-02-24 RX ORDER — METHYLPREDNISOLONE 4 MG/1
TABLET ORAL
Qty: 21 EACH | Refills: 0 | Status: SHIPPED | OUTPATIENT
Start: 2023-02-24 | End: 2023-03-17

## 2023-02-24 NOTE — PROGRESS NOTES
"Subjective:       Patient ID: Aysha Waters is a 22 y.o. female.    Vitals:  height is 5' 6" (1.676 m) and weight is 108.9 kg (240 lb). Her oral temperature is 98.9 °F (37.2 °C). Her blood pressure is 148/90 (abnormal) and her pulse is 92. Her oxygen saturation is 98%.     Chief Complaint: Headache    This is a 22 y.o. female who presents today with a chief complaint of  Patient presents with:  Headache, sinuses x 3 days. Pt states that she is having sinus congestion with yellow green coloring noted. Pt states that she has tried several OTC regimens with no relief. No testing per patient. Pt denies any other symptoms at this time.         Headache   This is a new problem. The current episode started in the past 7 days. The problem occurs constantly. The pain does not radiate. The pain is at a severity of 0/10. The pain is mild. Associated symptoms include sinus pressure.     Constitution: Negative.   HENT:  Positive for congestion, sinus pain and sinus pressure.    Neck: neck negative.   Cardiovascular: Negative.    Eyes: Negative.    Respiratory: Negative.     Gastrointestinal: Negative.    Endocrine: negative.   Genitourinary: Negative.    Musculoskeletal: Negative.    Skin: Negative.    Allergic/Immunologic: Negative.    Neurological:  Positive for headaches.     Objective:      Physical Exam   Constitutional: She is oriented to person, place, and time. obesity  HENT:   Head: Normocephalic and atraumatic.   Ears:   Right Ear: Tympanic membrane, external ear and ear canal normal.   Left Ear: Tympanic membrane, external ear and ear canal normal.   Nose: Congestion present. Right sinus exhibits maxillary sinus tenderness and frontal sinus tenderness. Left sinus exhibits maxillary sinus tenderness and frontal sinus tenderness.   Mouth/Throat: Mucous membranes are moist. Posterior oropharyngeal erythema present.   Eyes: Conjunctivae are normal. Pupils are equal, round, and reactive to light. Extraocular movement " intact   Neck: Neck supple.   Cardiovascular: Normal rate, regular rhythm and normal heart sounds.   Pulmonary/Chest: Effort normal and breath sounds normal.   Abdominal: Normal appearance and bowel sounds are normal. Soft. flat abdomen   Musculoskeletal: Normal range of motion.         General: Normal range of motion.   Neurological: no focal deficit. She is alert, oriented to person, place, and time and at baseline.      Comments: Positive for headache   Skin: Skin is warm and dry. Capillary refill takes less than 2 seconds.   Psychiatric: Her behavior is normal. Mood and thought content normal.   Nursing note and vitals reviewed.      Assessment:       1. Bacterial upper respiratory infection    2. Head congestion          Plan:         Bacterial upper respiratory infection  -     azithromycin (Z-MAIA) 250 MG tablet; Take 2 tablets by mouth on day 1; Take 1 tablet by mouth on days 2-5  Dispense: 6 tablet; Refill: 0  -     methylPREDNISolone (MEDROL DOSEPACK) 4 mg tablet; use as directed  Dispense: 21 each; Refill: 0    Head congestion  -     azithromycin (Z-MAIA) 250 MG tablet; Take 2 tablets by mouth on day 1; Take 1 tablet by mouth on days 2-5  Dispense: 6 tablet; Refill: 0  -     methylPREDNISolone (MEDROL DOSEPACK) 4 mg tablet; use as directed  Dispense: 21 each; Refill: 0

## 2023-02-24 NOTE — PATIENT INSTRUCTIONS
Please return here or go to the Emergency Department for any concerns or worsening of condition.  Please drink plenty of fluids.  Please get plenty of rest.  If you were prescribed antibiotics, please take them to completion.  If you were given wait & see antibiotics, please wait 5-7 days before taking them, and only take them if your symptoms have worsened or not improved.  If you do begin taking the antibiotics, please take them to completion.  If you were given a steroid shot in the clinic and have also been given a prescription for a steroid such as Prednisone or a Medrol Dose Pack, please begin taking them tomorrow.  If you do not have Hypertension or any history of palpitations, it is ok to take over the counter Sudafed or Mucinex D or Allegra-D or Claritin-D or Zyrtec-D.  If you do take one of the above, it is ok to combine that with plain over the counter Mucinex or Allegra or Claritin or Zyrtec.  If for example you are taking Zyrtec -D, you can combine that with Mucinex, but not Mucinex-D.  If you are taking Mucinex-D, you can combine that with plain Allegra or Claritin or Zyrtec.   If you do have Hypertension or palpitations, it is safe to take Coricidin HBP for relief of sinus symptoms.  If not allergic, please take over the counter Tylenol (Acetaminophen) and/or Motrin (Ibuprofen) as directed for control of pain and/or fever.  Please follow up with your primary care doctor or specialist as needed.    If you  smoke, please stop smoking.

## 2023-07-21 NOTE — ASSESSMENT & PLAN NOTE
11m F presenting with 3 days of cough, post-tussive emesis, and fever (Tmax 104) found to be positive for human metapneumovirus admitted for decreased PO intake and dehydration who is currently stable. Pt has been febrile for 4 days, CXR on 7/19 showed no consolidation. We will continue monitoring the patient for fever course and monitor hydration status. On exam today, patient was clear to auscultation. Given +hMPV, cough, CXR clear, intermittent fevers for 4 days, the most likely diagnosis is viral pneumonia, unlikely bacterial pneumonia at this time. Pt continues to have poor po intake, parents encouraged to continue trying to feed her. Goal for today continues to be to po trial off of fluids.     #hMPV  - Tylenol/motrin PRN for fever    #FEN/GI  - regular diet  - mIVF   - continue monitoring food and fluid intake  - strict I/Os As above     11m F presenting with 3 days of cough, post-tussive emesis, and fever (Tmax 104) found to be positive for human metapneumovirus admitted for decreased PO intake and dehydration who is currently stable. Pt has been febrile for 5 days, CXR on 7/19 showed no consolidation. Although patient continues to be clear to auscultation, she continues to have high fevers, plan is to start antibiotics. We will continue monitoring the patient for fever course and monitor hydration status. Pt continues to have poor po intake, parents encouraged to continue trying to feed her. Goal for today continues to be to po trial off of fluids.     #hMPV  - Tylenol/motrin PRN for fever    #high fevers  - po amoxicillin starting 7/21    #FEN/GI  - regular diet  - mIVF   - continue monitoring food and fluid intake  - strict I/Os 11m F presenting with 3 days of cough, post-tussive emesis, and fever (Tmax 104) found to be positive for human metapneumovirus admitted for decreased PO intake and dehydration who is currently stable. Pt has been febrile for 5 days, CXR on 7/19 showed no consolidation. Although patient continues to be clear to auscultation, she continues to have high fevers, plan is to start antibiotics. IV access was lost today, but given improvement in po intake, patient will be trialed po only today. If she fails po trial, IVF will be restarted. We will continue monitoring the patient for fever course and monitor hydration status. Pt continues to have poor po intake, parents encouraged to continue trying to feed her. Goal for today continues to be to po trial off of fluids.     #hMPV  - Tylenol/motrin PRN for fever    #high fevers  - po amoxicillin starting 7/21    #FEN/GI  - regular diet  - mIVF   - continue monitoring food and fluid intake  - strict I/Os 11m F presenting with 3 days of cough, post-tussive emesis, and fever (Tmax 104) found to be positive for human metapneumovirus admitted for decreased PO intake and dehydration who is currently stable. Pt has been febrile for 5 days, CXR on 7/19 showed no consolidation. Although patient continues to be clear to auscultation, she continues to have high fevers, plan is to start antibiotics. IV access was lost today, but given improvement in po intake, patient will be trialed po only today. If she fails po trial, IVF will be restarted. We will continue monitoring the patient for fever course and monitor hydration status. Pt continues to have poor po intake, parents encouraged to continue trying to feed her. Goal for today continues to be to po trial off of fluids.     #hMPV  - Tylenol/motrin PRN for fever    #high fevers  - po amoxicillin starting 7/21    #FEN/GI  - regular diet  - mIVF discontinued, patient lost IV access  - continue monitoring food and fluid intake  - strict I/Os

## 2023-09-27 ENCOUNTER — OFFICE VISIT (OUTPATIENT)
Dept: FAMILY MEDICINE | Facility: CLINIC | Age: 23
End: 2023-09-27
Payer: COMMERCIAL

## 2023-09-27 VITALS
DIASTOLIC BLOOD PRESSURE: 88 MMHG | WEIGHT: 251.63 LBS | RESPIRATION RATE: 16 BRPM | HEIGHT: 66 IN | HEART RATE: 97 BPM | SYSTOLIC BLOOD PRESSURE: 130 MMHG | BODY MASS INDEX: 40.44 KG/M2 | OXYGEN SATURATION: 98 %

## 2023-09-27 DIAGNOSIS — G43.709 CHRONIC MIGRAINE WITHOUT AURA WITHOUT STATUS MIGRAINOSUS, NOT INTRACTABLE: Primary | ICD-10-CM

## 2023-09-27 PROCEDURE — 1160F RVW MEDS BY RX/DR IN RCRD: CPT | Mod: CPTII,S$GLB,, | Performed by: FAMILY MEDICINE

## 2023-09-27 PROCEDURE — 99214 OFFICE O/P EST MOD 30 MIN: CPT | Mod: S$GLB,,, | Performed by: FAMILY MEDICINE

## 2023-09-27 PROCEDURE — 3075F SYST BP GE 130 - 139MM HG: CPT | Mod: CPTII,S$GLB,, | Performed by: FAMILY MEDICINE

## 2023-09-27 PROCEDURE — 3079F DIAST BP 80-89 MM HG: CPT | Mod: CPTII,S$GLB,, | Performed by: FAMILY MEDICINE

## 2023-09-27 PROCEDURE — 1159F PR MEDICATION LIST DOCUMENTED IN MEDICAL RECORD: ICD-10-PCS | Mod: CPTII,S$GLB,, | Performed by: FAMILY MEDICINE

## 2023-09-27 PROCEDURE — 99214 PR OFFICE/OUTPT VISIT, EST, LEVL IV, 30-39 MIN: ICD-10-PCS | Mod: S$GLB,,, | Performed by: FAMILY MEDICINE

## 2023-09-27 PROCEDURE — 99999 PR PBB SHADOW E&M-EST. PATIENT-LVL III: CPT | Mod: PBBFAC,,, | Performed by: FAMILY MEDICINE

## 2023-09-27 PROCEDURE — 3008F PR BODY MASS INDEX (BMI) DOCUMENTED: ICD-10-PCS | Mod: CPTII,S$GLB,, | Performed by: FAMILY MEDICINE

## 2023-09-27 PROCEDURE — 99999 PR PBB SHADOW E&M-EST. PATIENT-LVL III: ICD-10-PCS | Mod: PBBFAC,,, | Performed by: FAMILY MEDICINE

## 2023-09-27 PROCEDURE — 3075F PR MOST RECENT SYSTOLIC BLOOD PRESS GE 130-139MM HG: ICD-10-PCS | Mod: CPTII,S$GLB,, | Performed by: FAMILY MEDICINE

## 2023-09-27 PROCEDURE — 3008F BODY MASS INDEX DOCD: CPT | Mod: CPTII,S$GLB,, | Performed by: FAMILY MEDICINE

## 2023-09-27 PROCEDURE — 1160F PR REVIEW ALL MEDS BY PRESCRIBER/CLIN PHARMACIST DOCUMENTED: ICD-10-PCS | Mod: CPTII,S$GLB,, | Performed by: FAMILY MEDICINE

## 2023-09-27 PROCEDURE — 1159F MED LIST DOCD IN RCRD: CPT | Mod: CPTII,S$GLB,, | Performed by: FAMILY MEDICINE

## 2023-09-27 PROCEDURE — 3079F PR MOST RECENT DIASTOLIC BLOOD PRESSURE 80-89 MM HG: ICD-10-PCS | Mod: CPTII,S$GLB,, | Performed by: FAMILY MEDICINE

## 2023-09-27 RX ORDER — PROMETHAZINE HYDROCHLORIDE 25 MG/1
25-50 TABLET ORAL EVERY 4 HOURS PRN
Qty: 30 TABLET | Refills: 3 | Status: SHIPPED | OUTPATIENT
Start: 2023-09-27

## 2023-09-27 RX ORDER — SUMATRIPTAN 50 MG/1
50 TABLET, FILM COATED ORAL
Qty: 9 TABLET | Refills: 3 | Status: SHIPPED | OUTPATIENT
Start: 2023-09-27 | End: 2024-01-18

## 2023-09-27 RX ORDER — TOPIRAMATE 25 MG/1
TABLET ORAL
Qty: 77 TABLET | Refills: 0 | Status: SHIPPED | OUTPATIENT
Start: 2023-09-27 | End: 2023-10-31

## 2023-09-27 NOTE — PROGRESS NOTES
Subjective:       Patient ID: Aysha Waters is a 22 y.o. female.    Chief Complaint: Migraine (Pt states that she has horrible migraines, has tried ibuprofen, tylenol, and hot towels with no relief)    Migraines: Chronic  Worsening in frequency and duration  Almost having 14 headache days/month  On no preventive. Would like to consider      Review of Systems   Constitutional:  Negative for activity change, appetite change, fatigue and fever.   Respiratory:  Negative for shortness of breath.    Gastrointestinal:  Negative for abdominal pain.   Integumentary:  Negative for rash.         Objective:      Physical Exam  Vitals and nursing note reviewed.   Constitutional:       General: She is not in acute distress.     Appearance: She is not ill-appearing.   Cardiovascular:      Rate and Rhythm: Normal rate and regular rhythm.      Heart sounds: No murmur heard.  Pulmonary:      Effort: Pulmonary effort is normal.      Breath sounds: Normal breath sounds. No wheezing.   Skin:     General: Skin is warm and dry.      Findings: No rash.   Neurological:      Mental Status: She is alert.   Psychiatric:         Mood and Affect: Mood normal.         Behavior: Behavior normal.         Assessment:       1. Chronic migraine without aura without status migrainosus, not intractable        Plan:       Problem List Items Addressed This Visit    None  Visit Diagnoses       Chronic migraine without aura without status migrainosus, not intractable    -  Primary    Relevant Medications    sumatriptan (IMITREX) 50 MG tablet    promethazine (PHENERGAN) 25 MG tablet    topiramate (TOPAMAX) 25 MG tablet            Reviewed migraine treatment plan and handout provided.

## 2023-10-22 DIAGNOSIS — E28.2 PCOS (POLYCYSTIC OVARIAN SYNDROME): ICD-10-CM

## 2023-10-22 NOTE — TELEPHONE ENCOUNTER
No care due was identified.  [facility] Embedded Care Due Messages. Reference number: [id number].   10/22/2023 8:03:51 AM CDT

## 2023-10-23 RX ORDER — NORGESTIMATE AND ETHINYL ESTRADIOL 0.25-0.035
KIT ORAL
Qty: 84 TABLET | Refills: 3 | Status: SHIPPED | OUTPATIENT
Start: 2023-10-23

## 2023-10-23 NOTE — TELEPHONE ENCOUNTER
Refill Decision Note   Aysha Guerreroeton  is requesting a refill authorization.  Brief Assessment and Rationale for Refill:  Approve     Medication Therapy Plan:         Comments:     Note composed:5:48 AM 10/23/2023

## 2023-10-30 DIAGNOSIS — G43.709 CHRONIC MIGRAINE WITHOUT AURA WITHOUT STATUS MIGRAINOSUS, NOT INTRACTABLE: ICD-10-CM

## 2023-10-30 NOTE — TELEPHONE ENCOUNTER
Refill Routing Note   Medication(s) are not appropriate for processing by [facility] for the following reason(s):      Medication outside of protocol    [facility] action(s):  Route Care Due:  None identified          Appointments  past 12m or future 3m with PCP    Date Provider   Last Visit   9/27/2023 Arelis Diaz MD   Next Visit   Visit date not found Arelis Diaz MD   ED visits in past 90 days: 0        Note composed:11:44 AM 10/30/2023

## 2023-10-31 RX ORDER — TOPIRAMATE 50 MG/1
50 TABLET, FILM COATED ORAL 2 TIMES DAILY
Qty: 60 TABLET | Refills: 11 | Status: SHIPPED | OUTPATIENT
Start: 2023-10-31 | End: 2024-10-30

## 2023-12-20 DIAGNOSIS — F41.1 GAD (GENERALIZED ANXIETY DISORDER): ICD-10-CM

## 2023-12-20 RX ORDER — FLUOXETINE HYDROCHLORIDE 20 MG/1
20 CAPSULE ORAL DAILY
Qty: 90 CAPSULE | Refills: 3 | Status: SHIPPED | OUTPATIENT
Start: 2023-12-20

## 2023-12-20 NOTE — TELEPHONE ENCOUNTER
No care due was identified.  [facility] Embedded Care Due Messages. Reference number: [id number].   12/20/2023 8:03:57 AM CST

## 2023-12-20 NOTE — TELEPHONE ENCOUNTER
Refill Decision Note   Aysha Guerreroeton  is requesting a refill authorization.  Brief Assessment and Rationale for Refill:  Approve     Medication Therapy Plan:        Comments:     Note composed:12:27 PM 12/20/2023

## 2024-01-18 DIAGNOSIS — G43.709 CHRONIC MIGRAINE WITHOUT AURA WITHOUT STATUS MIGRAINOSUS, NOT INTRACTABLE: ICD-10-CM

## 2024-01-18 RX ORDER — SUMATRIPTAN 50 MG/1
TABLET, FILM COATED ORAL
Qty: 27 TABLET | Refills: 2 | Status: SHIPPED | OUTPATIENT
Start: 2024-01-18 | End: 2024-02-19

## 2024-01-18 NOTE — TELEPHONE ENCOUNTER
Aysha Waters  is requesting a refill authorization.  Brief Assessment and Rationale for Refill:  Approve     Medication Therapy Plan:         Comments:     Note composed:10:42 AM 01/18/2024

## 2024-01-18 NOTE — TELEPHONE ENCOUNTER
No care due was identified.  [facility] Embedded Care Due Messages. Reference number: [id number].   1/18/2024 8:05:06 AM CST

## 2024-02-19 DIAGNOSIS — G43.709 CHRONIC MIGRAINE WITHOUT AURA WITHOUT STATUS MIGRAINOSUS, NOT INTRACTABLE: ICD-10-CM

## 2024-02-19 RX ORDER — SUMATRIPTAN 50 MG/1
TABLET, FILM COATED ORAL
Qty: 27 TABLET | Refills: 2 | Status: SHIPPED | OUTPATIENT
Start: 2024-02-19

## 2024-02-19 NOTE — TELEPHONE ENCOUNTER
Refill Decision Note   Aysha Waters  is requesting a refill authorization.  Brief Assessment and Rationale for Refill:  Approve     Medication Therapy Plan:       Medication Reconciliation Completed: No   Comments:     No Care Gaps recommended.     Note composed:9:58 AM 02/19/2024

## 2024-02-19 NOTE — TELEPHONE ENCOUNTER
No care due was identified.  [facility] Embedded Care Due Messages. Reference number: [id number].   2/19/2024 9:44:21 AM CST

## 2024-05-09 ENCOUNTER — OFFICE VISIT (OUTPATIENT)
Dept: FAMILY MEDICINE | Facility: CLINIC | Age: 24
End: 2024-05-09
Payer: COMMERCIAL

## 2024-05-09 VITALS
DIASTOLIC BLOOD PRESSURE: 82 MMHG | SYSTOLIC BLOOD PRESSURE: 128 MMHG | RESPIRATION RATE: 18 BRPM | OXYGEN SATURATION: 99 % | WEIGHT: 252.38 LBS | BODY MASS INDEX: 40.56 KG/M2 | HEART RATE: 101 BPM | HEIGHT: 66 IN

## 2024-05-09 DIAGNOSIS — J06.9 BACTERIAL URI: ICD-10-CM

## 2024-05-09 DIAGNOSIS — J01.10 ACUTE NON-RECURRENT FRONTAL SINUSITIS: Primary | ICD-10-CM

## 2024-05-09 DIAGNOSIS — B96.89 BACTERIAL URI: ICD-10-CM

## 2024-05-09 PROCEDURE — 1159F MED LIST DOCD IN RCRD: CPT | Mod: CPTII,S$GLB,, | Performed by: FAMILY MEDICINE

## 2024-05-09 PROCEDURE — 99999 PR PBB SHADOW E&M-EST. PATIENT-LVL IV: CPT | Mod: PBBFAC,,, | Performed by: FAMILY MEDICINE

## 2024-05-09 PROCEDURE — 3008F BODY MASS INDEX DOCD: CPT | Mod: CPTII,S$GLB,, | Performed by: FAMILY MEDICINE

## 2024-05-09 PROCEDURE — 99213 OFFICE O/P EST LOW 20 MIN: CPT | Mod: S$GLB,,, | Performed by: FAMILY MEDICINE

## 2024-05-09 PROCEDURE — 3074F SYST BP LT 130 MM HG: CPT | Mod: CPTII,S$GLB,, | Performed by: FAMILY MEDICINE

## 2024-05-09 PROCEDURE — 1160F RVW MEDS BY RX/DR IN RCRD: CPT | Mod: CPTII,S$GLB,, | Performed by: FAMILY MEDICINE

## 2024-05-09 PROCEDURE — 3079F DIAST BP 80-89 MM HG: CPT | Mod: CPTII,S$GLB,, | Performed by: FAMILY MEDICINE

## 2024-05-09 RX ORDER — PREDNISONE 20 MG/1
20 TABLET ORAL DAILY
Qty: 5 TABLET | Refills: 0 | Status: SHIPPED | OUTPATIENT
Start: 2024-05-09 | End: 2024-05-14

## 2024-05-09 RX ORDER — AZITHROMYCIN 250 MG/1
TABLET, FILM COATED ORAL
Qty: 6 EACH | Refills: 0 | Status: SHIPPED | OUTPATIENT
Start: 2024-05-09 | End: 2024-05-14

## 2024-05-09 NOTE — PROGRESS NOTES
Subjective:       Patient ID: Aysha Waters is a 23 y.o. female.    Chief Complaint: Shortness of Breath (Started yesterday and she did a breathing treatment./Sinus issues has been lasting about 2 weeks with runny nose and slight cough.)    Presents today for acute respiratory complaints.     She has had sinus pain and pressure going on for nearly 2 weeks. She denies any fevers. She did have a low grade temp elevation earlier this week. + runny nose.     No cough now- did start off with one.     Yesterday felt like she could not catch her breath. She does not have an asthma history but ended up doing a breathing treatment which helped her feel a bit better.     Most significant residual symptoms is the mucus and she feels like she cannot get rid of it and a headache/sinus pressure worse at night.         .  Patient Active Problem List   Diagnosis    Acanthosis nigricans    Obesity    Essential hypertension    Lumbar disc herniation    Ruptured lumbar intervertebral disc    Fever    CSF leak    Acute cystitis without hematuria     Aysha has a current medication list which includes the following prescription(s): fluoxetine, promethazine, sprintec (28), sumatriptan, topiramate, azithromycin, and prednisone.    Review of Systems   HENT:  Positive for nasal congestion, postnasal drip, rhinorrhea and sinus pressure/congestion.    Respiratory:  Positive for shortness of breath. Negative for cough (resolving).          Health Maintenance Due   Topic Date Due    Hepatitis C Screening  Never done    Pneumococcal Vaccines (Age 0-64) (1 of 2 - PCV) Never done    HIV Screening  Never done    HPV Vaccines (1 - 3-dose series) Never done    TETANUS VACCINE  Never done    Chlamydia Screening  06/23/2022    COVID-19 Vaccine (1 - 2023-24 season) Never done      Health Maintenance reviewed and discussed-   Objective:      Physical Exam  Vitals and nursing note reviewed.   Constitutional:       General: She is not in acute distress.      Appearance: She is not ill-appearing.   Cardiovascular:      Rate and Rhythm: Normal rate and regular rhythm.      Heart sounds: No murmur heard.  Pulmonary:      Effort: Pulmonary effort is normal.      Breath sounds: Normal breath sounds. No wheezing.   Skin:     General: Skin is warm and dry.      Findings: No rash.   Neurological:      Mental Status: She is alert.   Psychiatric:         Mood and Affect: Mood normal.         Behavior: Behavior normal.         Assessment:       1. Acute non-recurrent frontal sinusitis    2. Bacterial URI        Plan:       1. Acute non-recurrent frontal sinusitis  -     azithromycin (Z-MAIA) 250 MG tablet; Take 2 tablets by mouth on day 1; Take 1 tablet by mouth on days 2-5  Dispense: 6 each; Refill: 0  -     predniSONE (DELTASONE) 20 MG tablet; Take 1 tablet (20 mg total) by mouth once daily. for 5 days  Dispense: 5 tablet; Refill: 0    2. Bacterial URI  -     azithromycin (Z-MAIA) 250 MG tablet; Take 2 tablets by mouth on day 1; Take 1 tablet by mouth on days 2-5  Dispense: 6 each; Refill: 0  -     predniSONE (DELTASONE) 20 MG tablet; Take 1 tablet (20 mg total) by mouth once daily. for 5 days  Dispense: 5 tablet; Refill: 0       Supportive care. + Antibiotic coverage for possible bacterial sinusitis given >2 weeks of symptoms.

## 2024-10-03 ENCOUNTER — OFFICE VISIT (OUTPATIENT)
Dept: FAMILY MEDICINE | Facility: CLINIC | Age: 24
End: 2024-10-03
Payer: COMMERCIAL

## 2024-10-03 VITALS
BODY MASS INDEX: 40.31 KG/M2 | HEIGHT: 66 IN | TEMPERATURE: 98 F | WEIGHT: 250.81 LBS | DIASTOLIC BLOOD PRESSURE: 82 MMHG | SYSTOLIC BLOOD PRESSURE: 126 MMHG | HEART RATE: 90 BPM | RESPIRATION RATE: 14 BRPM | OXYGEN SATURATION: 98 %

## 2024-10-03 DIAGNOSIS — J32.4 PANSINUSITIS, UNSPECIFIED CHRONICITY: Primary | ICD-10-CM

## 2024-10-03 DIAGNOSIS — R05.9 COUGH, UNSPECIFIED TYPE: ICD-10-CM

## 2024-10-03 PROCEDURE — 3074F SYST BP LT 130 MM HG: CPT | Mod: CPTII,S$GLB,, | Performed by: NURSE PRACTITIONER

## 2024-10-03 PROCEDURE — 99999 PR PBB SHADOW E&M-EST. PATIENT-LVL III: CPT | Mod: PBBFAC,,, | Performed by: NURSE PRACTITIONER

## 2024-10-03 PROCEDURE — 3079F DIAST BP 80-89 MM HG: CPT | Mod: CPTII,S$GLB,, | Performed by: NURSE PRACTITIONER

## 2024-10-03 PROCEDURE — 3008F BODY MASS INDEX DOCD: CPT | Mod: CPTII,S$GLB,, | Performed by: NURSE PRACTITIONER

## 2024-10-03 PROCEDURE — 1159F MED LIST DOCD IN RCRD: CPT | Mod: CPTII,S$GLB,, | Performed by: NURSE PRACTITIONER

## 2024-10-03 PROCEDURE — 99214 OFFICE O/P EST MOD 30 MIN: CPT | Mod: S$GLB,,, | Performed by: NURSE PRACTITIONER

## 2024-10-03 RX ORDER — METHYLPREDNISOLONE 4 MG/1
TABLET ORAL
Qty: 21 EACH | Refills: 0 | Status: SHIPPED | OUTPATIENT
Start: 2024-10-03 | End: 2024-10-24

## 2024-10-03 RX ORDER — CEFDINIR 300 MG/1
300 CAPSULE ORAL 2 TIMES DAILY
Qty: 20 CAPSULE | Refills: 0 | Status: SHIPPED | OUTPATIENT
Start: 2024-10-03 | End: 2024-10-13

## 2024-10-03 RX ORDER — BENZONATATE 200 MG/1
200 CAPSULE ORAL 3 TIMES DAILY PRN
Qty: 30 CAPSULE | Refills: 0 | Status: SHIPPED | OUTPATIENT
Start: 2024-10-03 | End: 2024-10-13

## 2024-10-03 RX ORDER — PROMETHAZINE HYDROCHLORIDE AND DEXTROMETHORPHAN HYDROBROMIDE 6.25; 15 MG/5ML; MG/5ML
5 SYRUP ORAL EVERY 4 HOURS PRN
Qty: 200 ML | Refills: 0 | Status: SHIPPED | OUTPATIENT
Start: 2024-10-03

## 2024-10-03 NOTE — PROGRESS NOTES
Subjective:       Patient ID: Aysha Waters is a 23 y.o. female.    Chief Complaint: Cough (Ongoing for 10 days roughly)    Aysha Waters presents to the clinic today for ongoing sinus/cough   Established patient within the clinic, new patient to myself    Under the care of the following:  PCP: Dr. Diaz    Patient Active Problem List  Diagnosis  · Acanthosis nigricans  · Obesity  · Essential hypertension  · Lumbar disc herniation  · Ruptured lumbar intervertebral disc  · Fever  · CSF leak  · Acute cystitis without hematuria    Ongoing for about 2 weeks  Nasal congestion, PND, cough, chest congestion, hoarse voice  Has history of recurrent sinusitis   Taking otc products without much relief.       Review of Systems   Constitutional:  Negative for chills and fever.   HENT:  Positive for congestion, postnasal drip and rhinorrhea. Negative for sore throat and trouble swallowing.    Respiratory:  Positive for cough. Negative for chest tightness, shortness of breath and wheezing.    Cardiovascular:  Negative for chest pain and palpitations.   Neurological:  Negative for dizziness and headaches.       Patient Active Problem List   Diagnosis    Acanthosis nigricans    Obesity    Essential hypertension    Lumbar disc herniation    Ruptured lumbar intervertebral disc    Fever    CSF leak    Acute cystitis without hematuria       Objective:      Physical Exam  Vitals and nursing note reviewed.   Constitutional:       General: She is not in acute distress.     Appearance: Normal appearance. She is not ill-appearing.   HENT:      Right Ear: Tympanic membrane is bulging. Tympanic membrane is not erythematous.      Left Ear: Tympanic membrane is bulging. Tympanic membrane is not erythematous.      Nose: Mucosal edema and congestion present. No rhinorrhea.      Mouth/Throat:      Lips: Pink.      Pharynx: Oropharynx is clear. Uvula midline. Postnasal drip present. No posterior oropharyngeal erythema.   Cardiovascular:       "Rate and Rhythm: Normal rate and regular rhythm.      Heart sounds: Normal heart sounds. No murmur heard.  Pulmonary:      Effort: Pulmonary effort is normal.      Breath sounds: Normal breath sounds. No wheezing.   Lymphadenopathy:      Cervical: No cervical adenopathy.   Skin:     General: Skin is warm and dry.      Findings: No rash.   Neurological:      Mental Status: She is alert.   Psychiatric:         Mood and Affect: Mood normal.         Behavior: Behavior normal.         Lab Results   Component Value Date    WBC 8.20 11/23/2022    HGB 12.5 11/23/2022    HCT 38.6 11/23/2022     11/23/2022    CHOL 184 11/23/2022    TRIG 302 (H) 11/23/2022    HDL 41 11/23/2022    ALT 24 11/23/2022    AST 17 11/23/2022     11/23/2022    K 4.2 11/23/2022     11/23/2022    CREATININE 0.7 11/23/2022    BUN 10 11/23/2022    CO2 22 (L) 11/23/2022    TSH 3.260 10/27/2021    HGBA1C 5.3 11/23/2022     The ASCVD Risk score ([employer] DK, et al., 2019) failed to calculate for the following reasons:    The 2019 ASCVD risk score is only valid for ages 40 to 79  Visit Vitals  /82 (BP Location: Left arm, Patient Position: Sitting)   Pulse 90   Temp 98.4 °F (36.9 °C) (Oral)   Resp 14   Ht 5' 6" (1.676 m)   Wt 113.8 kg (250 lb 12.8 oz)   SpO2 98%   BMI 40.48 kg/m²      Assessment:       1. Pansinusitis, unspecified chronicity    2. Cough, unspecified type        Plan:       1. Pansinusitis, unspecified chronicity  -     methylPREDNISolone (MEDROL DOSEPACK) 4 mg tablet; use as directed  Dispense: 21 each; Refill: 0  -     cefdinir (OMNICEF) 300 MG capsule; Take 1 capsule (300 mg total) by mouth 2 (two) times daily. for 10 days  Dispense: 20 capsule; Refill: 0  saline nasal flushes 2-3 times daily/as needed for increased sinus/nasal congestion  Warm compresses to the face for sinus pressure  Take medications as prescribed with food until all gone   Warm salt water gargles, throat lozenges, warm honey/lemon as needed for " sore throat  maintain hydration  Mucinex otc as directed for productive cough  cool mist humidifier at night for increased congestion       2. Cough, unspecified type  -     benzonatate (TESSALON) 200 MG capsule; Take 1 capsule (200 mg total) by mouth 3 (three) times daily as needed for Cough.  Dispense: 30 capsule; Refill: 0  -     promethazine-dextromethorphan (PROMETHAZINE-DM) 6.25-15 mg/5 mL Syrp; Take 5 mLs by mouth every 4 (four) hours as needed (cough).  Dispense: 200 mL; Refill: 0           No follow-ups on file.

## 2024-10-08 DIAGNOSIS — E28.2 PCOS (POLYCYSTIC OVARIAN SYNDROME): ICD-10-CM

## 2024-10-08 RX ORDER — NORGESTIMATE AND ETHINYL ESTRADIOL 0.25-0.035
1 KIT ORAL DAILY
Qty: 84 TABLET | Refills: 1 | Status: SHIPPED | OUTPATIENT
Start: 2024-10-08

## 2024-10-08 NOTE — TELEPHONE ENCOUNTER
No care due was identified.  [facility] Embedded Care Due Messages. Reference number: [id number].   10/08/2024 8:01:35 AM CDT

## 2024-10-08 NOTE — TELEPHONE ENCOUNTER
Refill Decision Note   Aysha Waters  is requesting a refill authorization.  Brief Assessment and Rationale for Refill:  Approve     Medication Therapy Plan:         Pharmacist review requested: Yes   Extended chart review required: Yes   Comments:     Note composed:4:48 PM 10/08/2024

## 2024-10-08 NOTE — TELEPHONE ENCOUNTER
Refill Routing Note   Medication(s) are not appropriate for processing by [facility] for the following reason(s):        Drug-disease interaction:     [facility] action(s):  Defer      Medication Therapy Plan:       Pharmacist review requested: Yes     Appointments  past 12m or future 3m with PCP    Date Provider   Last Visit   5/9/2024 Arelis Diaz MD   Next Visit   Visit date not found Arelis Diaz MD   ED visits in past 90 days: 0        Note composed:3:18 PM 10/08/2024

## 2024-10-15 ENCOUNTER — PATIENT MESSAGE (OUTPATIENT)
Dept: FAMILY MEDICINE | Facility: CLINIC | Age: 24
End: 2024-10-15
Payer: COMMERCIAL

## 2024-10-16 ENCOUNTER — OFFICE VISIT (OUTPATIENT)
Dept: FAMILY MEDICINE | Facility: CLINIC | Age: 24
End: 2024-10-16
Payer: COMMERCIAL

## 2024-10-16 VITALS
BODY MASS INDEX: 40.07 KG/M2 | DIASTOLIC BLOOD PRESSURE: 88 MMHG | WEIGHT: 249.31 LBS | OXYGEN SATURATION: 99 % | SYSTOLIC BLOOD PRESSURE: 132 MMHG | RESPIRATION RATE: 16 BRPM | HEART RATE: 98 BPM | HEIGHT: 66 IN

## 2024-10-16 DIAGNOSIS — G43.709 CHRONIC MIGRAINE WITHOUT AURA WITHOUT STATUS MIGRAINOSUS, NOT INTRACTABLE: Primary | ICD-10-CM

## 2024-10-16 PROCEDURE — 3008F BODY MASS INDEX DOCD: CPT | Mod: CPTII,S$GLB,, | Performed by: FAMILY MEDICINE

## 2024-10-16 PROCEDURE — 99214 OFFICE O/P EST MOD 30 MIN: CPT | Mod: S$GLB,,, | Performed by: FAMILY MEDICINE

## 2024-10-16 PROCEDURE — 3075F SYST BP GE 130 - 139MM HG: CPT | Mod: CPTII,S$GLB,, | Performed by: FAMILY MEDICINE

## 2024-10-16 PROCEDURE — 3079F DIAST BP 80-89 MM HG: CPT | Mod: CPTII,S$GLB,, | Performed by: FAMILY MEDICINE

## 2024-10-16 PROCEDURE — 99999 PR PBB SHADOW E&M-EST. PATIENT-LVL III: CPT | Mod: PBBFAC,,, | Performed by: FAMILY MEDICINE

## 2024-10-16 PROCEDURE — 1159F MED LIST DOCD IN RCRD: CPT | Mod: CPTII,S$GLB,, | Performed by: FAMILY MEDICINE

## 2024-10-16 RX ORDER — GALCANEZUMAB 120 MG/ML
240 INJECTION, SOLUTION SUBCUTANEOUS ONCE
Qty: 2 ML | Refills: 0 | Status: SHIPPED | OUTPATIENT
Start: 2024-10-16 | End: 2024-10-16

## 2024-10-16 RX ORDER — SUMATRIPTAN SUCCINATE 100 MG/1
100 TABLET ORAL
Qty: 9 TABLET | Refills: 11 | Status: SHIPPED | OUTPATIENT
Start: 2024-10-16 | End: 2024-11-15

## 2024-10-16 RX ORDER — GALCANEZUMAB 120 MG/ML
120 INJECTION, SOLUTION SUBCUTANEOUS
Qty: 1 ML | Refills: 11 | Status: SHIPPED | OUTPATIENT
Start: 2024-11-16

## 2024-10-16 NOTE — PROGRESS NOTES
Subjective:       Patient ID: Aysha Waters is a 23 y.o. female.    Chief Complaint: Migraine (Frequent migraines. Patient will wake up from sleeping. Meds that she is currently on are not helping)    Chronic migraines  No relief with topamax as a preventive  Does get relief with the sumatriptan but has to take 100mg.   Has 20+ headache days/month.   This is greatly impacting life and job.     Has been on topamax > 8 weeks.   Previously on propranolol in the past with another provider. Took this for several months without successful prevention.         .  Patient Active Problem List   Diagnosis    Acanthosis nigricans    Obesity    Essential hypertension    Lumbar disc herniation    Ruptured lumbar intervertebral disc    Fever    CSF leak    Acute cystitis without hematuria    Chronic migraine without aura without status migrainosus, not intractable     Aysha has a current medication list which includes the following prescription(s): fluoxetine, methylprednisolone, norgestimate-ethinyl estradiol, promethazine, promethazine-dextromethorphan, topiramate, emgality pen, [START ON 11/16/2024] emgality pen, and sumatriptan.    Review of Systems   Constitutional:  Negative for activity change, appetite change, fatigue and fever.   Respiratory:  Negative for shortness of breath.    Gastrointestinal:  Negative for abdominal pain.   Integumentary:  Negative for rash.   Neurological:  Positive for headaches.         Health Maintenance Due   Topic Date Due    Hepatitis C Screening  Never done    Pneumococcal Vaccines (Age 0-64) (1 of 2 - PCV) Never done    HIV Screening  Never done    HPV Vaccines (1 - 3-dose series) Never done    TETANUS VACCINE  Never done    Chlamydia Screening  06/23/2022    Influenza Vaccine (1) Never done    COVID-19 Vaccine (1 - 2024-25 season) Never done      Health Maintenance reviewed and discussed- Nothing additional needed today   Objective:      Physical Exam  Vitals and nursing note reviewed.    Constitutional:       General: She is not in acute distress.     Appearance: Normal appearance. She is not ill-appearing.   HENT:      Right Ear: Tympanic membrane is bulging. Tympanic membrane is not erythematous.      Left Ear: Tympanic membrane is bulging. Tympanic membrane is not erythematous.      Nose: Mucosal edema and congestion present. No rhinorrhea.      Mouth/Throat:      Lips: Pink.      Pharynx: Oropharynx is clear. Uvula midline. Postnasal drip present. No posterior oropharyngeal erythema.   Cardiovascular:      Rate and Rhythm: Normal rate and regular rhythm.      Heart sounds: Normal heart sounds. No murmur heard.  Pulmonary:      Effort: Pulmonary effort is normal.      Breath sounds: Normal breath sounds. No wheezing.   Lymphadenopathy:      Cervical: No cervical adenopathy.   Skin:     General: Skin is warm and dry.      Findings: No rash.   Neurological:      Mental Status: She is alert.   Psychiatric:         Mood and Affect: Mood normal.         Behavior: Behavior normal.         Assessment:       1. Chronic migraine without aura without status migrainosus, not intractable        Plan:       1. Chronic migraine without aura without status migrainosus, not intractable  -     sumatriptan (IMITREX) 100 MG tablet; Take 1 tablet (100 mg total) by mouth every 2 (two) hours as needed for Migraine. (DO NOT EXCEED TWO DOSES/DAY  Dispense: 9 tablet; Refill: 11  -     galcanezumab-gnlm (EMGALITY PEN) 120 mg/mL PnIj; Inject 2 mLs (240 mg total) into the skin once. Loading dose for 1 dose  Dispense: 2 mL; Refill: 0  -     galcanezumab-gnlm (EMGALITY PEN) 120 mg/mL PnIj; Inject 1 mL (120 mg total) into the skin every 28 days.  Dispense: 1 mL; Refill: 11       INFORMATION FOR PA:   - Patient has a diagnosis of chronic migraine  - Patient has failed > 8 weeks of topamax (3/24-10/24); propranol (2022)  - Has 20 migraine days/ month

## 2024-12-27 NOTE — DISCHARGE INSTRUCTIONS
Take medications as directed  Use heat on and off  Follow up with your Ortho  Return to Emergency Department for worsening symptoms  
REQUIRED- Click to run Sepsis Recognition Calculator

## 2025-01-13 ENCOUNTER — OFFICE VISIT (OUTPATIENT)
Dept: FAMILY MEDICINE | Facility: CLINIC | Age: 25
End: 2025-01-13
Payer: COMMERCIAL

## 2025-01-13 VITALS
DIASTOLIC BLOOD PRESSURE: 88 MMHG | RESPIRATION RATE: 18 BRPM | OXYGEN SATURATION: 97 % | HEIGHT: 66 IN | BODY MASS INDEX: 41.43 KG/M2 | SYSTOLIC BLOOD PRESSURE: 138 MMHG | WEIGHT: 257.81 LBS | HEART RATE: 98 BPM

## 2025-01-13 DIAGNOSIS — R04.0 BLEEDING NOSE: ICD-10-CM

## 2025-01-13 DIAGNOSIS — E28.2 PCOS (POLYCYSTIC OVARIAN SYNDROME): ICD-10-CM

## 2025-01-13 DIAGNOSIS — I10 ESSENTIAL HYPERTENSION: ICD-10-CM

## 2025-01-13 DIAGNOSIS — G43.709 CHRONIC MIGRAINE WITHOUT AURA WITHOUT STATUS MIGRAINOSUS, NOT INTRACTABLE: Primary | ICD-10-CM

## 2025-01-13 DIAGNOSIS — Z13.220 ENCOUNTER FOR LIPID SCREENING FOR CARDIOVASCULAR DISEASE: ICD-10-CM

## 2025-01-13 DIAGNOSIS — G89.29 CHRONIC LEFT SHOULDER PAIN: ICD-10-CM

## 2025-01-13 DIAGNOSIS — Z11.59 NEED FOR HEPATITIS C SCREENING TEST: ICD-10-CM

## 2025-01-13 DIAGNOSIS — F41.1 GAD (GENERALIZED ANXIETY DISORDER): ICD-10-CM

## 2025-01-13 DIAGNOSIS — M25.512 CHRONIC LEFT SHOULDER PAIN: ICD-10-CM

## 2025-01-13 DIAGNOSIS — Z13.6 ENCOUNTER FOR LIPID SCREENING FOR CARDIOVASCULAR DISEASE: ICD-10-CM

## 2025-01-13 PROCEDURE — 3044F HG A1C LEVEL LT 7.0%: CPT | Mod: CPTII,S$GLB,, | Performed by: FAMILY MEDICINE

## 2025-01-13 PROCEDURE — 3079F DIAST BP 80-89 MM HG: CPT | Mod: CPTII,S$GLB,, | Performed by: FAMILY MEDICINE

## 2025-01-13 PROCEDURE — 99999 PR PBB SHADOW E&M-EST. PATIENT-LVL IV: CPT | Mod: PBBFAC,,, | Performed by: FAMILY MEDICINE

## 2025-01-13 PROCEDURE — 3075F SYST BP GE 130 - 139MM HG: CPT | Mod: CPTII,S$GLB,, | Performed by: FAMILY MEDICINE

## 2025-01-13 PROCEDURE — 3008F BODY MASS INDEX DOCD: CPT | Mod: CPTII,S$GLB,, | Performed by: FAMILY MEDICINE

## 2025-01-13 PROCEDURE — 99214 OFFICE O/P EST MOD 30 MIN: CPT | Mod: S$GLB,,, | Performed by: FAMILY MEDICINE

## 2025-01-13 RX ORDER — IBUPROFEN 800 MG/1
800 TABLET ORAL 3 TIMES DAILY PRN
Qty: 90 TABLET | Refills: 1 | Status: SHIPPED | OUTPATIENT
Start: 2025-01-13

## 2025-01-13 RX ORDER — TIZANIDINE 4 MG/1
4 TABLET ORAL EVERY 8 HOURS PRN
Qty: 30 TABLET | Refills: 0 | Status: SHIPPED | OUTPATIENT
Start: 2025-01-13 | End: 2025-01-23

## 2025-01-13 RX ORDER — NORGESTIMATE AND ETHINYL ESTRADIOL 0.25-0.035
1 KIT ORAL DAILY
Qty: 84 TABLET | Refills: 1 | Status: SHIPPED | OUTPATIENT
Start: 2025-01-13

## 2025-01-13 RX ORDER — FLUOXETINE HYDROCHLORIDE 20 MG/1
20 CAPSULE ORAL DAILY
Qty: 90 CAPSULE | Refills: 3 | Status: SHIPPED | OUTPATIENT
Start: 2025-01-13

## 2025-01-13 RX ORDER — SUMATRIPTAN SUCCINATE 100 MG/1
100 TABLET ORAL
Qty: 9 TABLET | Refills: 11 | Status: CANCELLED | OUTPATIENT
Start: 2025-01-13 | End: 2025-02-12

## 2025-01-13 NOTE — PROGRESS NOTES
Subjective:       Patient ID: Aysha Waters is a 24 y.o. female.    Chief Complaint: Follow-up    Presents today for follow up.   Overall doing well.   Her migraine control has improves significantly since starting her new medication.     She is interested in medically assisted weight loss.   Unfortunately her insurance does not cover the medications that we prescribe. Discussed that I am not opposed to her trying a stimulant medication for a appetite suppression but I am not currently prescribing these medications.         .  Patient Active Problem List   Diagnosis    Acanthosis nigricans    Obesity    Essential hypertension    Lumbar disc herniation    Ruptured lumbar intervertebral disc    Fever    CSF leak    Acute cystitis without hematuria    Chronic migraine without aura without status migrainosus, not intractable     Aysha has a current medication list which includes the following prescription(s): emgality pen, promethazine, sumatriptan, fluoxetine, ibuprofen, norgestimate-ethinyl estradiol, promethazine-dextromethorphan, tizanidine, and topiramate.    Review of Systems   Constitutional:  Negative for activity change, appetite change, fatigue and fever.   Respiratory:  Negative for shortness of breath.    Gastrointestinal:  Negative for abdominal pain.   Integumentary:  Negative for rash.         Health Maintenance Due   Topic Date Due    Hepatitis C Screening  Never done    HIV Screening  Never done    HPV Vaccines (1 - 3-dose series) Never done    TETANUS VACCINE  Never done    Chlamydia Screening  06/23/2022      Health Maintenance reviewed  Objective:      Physical Exam  Constitutional:       General: She is not in acute distress.     Appearance: Normal appearance. She is not ill-appearing.   Pulmonary:      Effort: Pulmonary effort is normal. No respiratory distress.   Neurological:      Mental Status: She is alert.   Psychiatric:         Mood and Affect: Mood normal.         Behavior: Behavior  normal.         Thought Content: Thought content normal.         Judgment: Judgment normal.         Assessment:       1. Chronic migraine without aura without status migrainosus, not intractable    2. LLUVIA (generalized anxiety disorder)    3. PCOS (polycystic ovarian syndrome)    4. Chronic left shoulder pain    5. Bleeding nose    6. Encounter for lipid screening for cardiovascular disease    7. Need for hepatitis C screening test    8. Essential hypertension        Plan:       1. Chronic migraine without aura without status migrainosus, not intractable  -     ibuprofen (ADVIL,MOTRIN) 800 MG tablet; Take 1 tablet (800 mg total) by mouth 3 (three) times daily as needed for Pain.  Dispense: 90 tablet; Refill: 1    2. LLUVIA (generalized anxiety disorder)  -     FLUoxetine 20 MG capsule; Take 1 capsule (20 mg total) by mouth once daily.  Dispense: 90 capsule; Refill: 3    3. PCOS (polycystic ovarian syndrome)  -     norgestimate-ethinyl estradioL (ORTHO-CYCLEN) 0.25-35 mg-mcg per tablet; Take 1 tablet by mouth once daily.  Dispense: 84 tablet; Refill: 1  -     Hemoglobin A1C; Future; Expected date: 01/13/2025    4. Chronic left shoulder pain  -     tiZANidine (ZANAFLEX) 4 MG tablet; Take 1 tablet (4 mg total) by mouth every 8 (eight) hours as needed (muscle pain).  Dispense: 30 tablet; Refill: 0    5. Bleeding nose  -     Ambulatory referral/consult to ENT; Future; Expected date: 01/20/2025    6. Encounter for lipid screening for cardiovascular disease  -     Lipid Panel; Future; Expected date: 01/13/2025    7. Need for hepatitis C screening test  -     Hepatitis C Antibody; Future; Expected date: 01/13/2025    8. Essential hypertension  -     Comprehensive Metabolic Panel; Future; Expected date: 01/13/2025

## 2025-01-14 ENCOUNTER — LAB VISIT (OUTPATIENT)
Dept: LAB | Facility: HOSPITAL | Age: 25
End: 2025-01-14
Attending: FAMILY MEDICINE
Payer: COMMERCIAL

## 2025-01-14 DIAGNOSIS — Z13.220 ENCOUNTER FOR LIPID SCREENING FOR CARDIOVASCULAR DISEASE: ICD-10-CM

## 2025-01-14 DIAGNOSIS — E28.2 PCOS (POLYCYSTIC OVARIAN SYNDROME): ICD-10-CM

## 2025-01-14 DIAGNOSIS — Z11.59 NEED FOR HEPATITIS C SCREENING TEST: ICD-10-CM

## 2025-01-14 DIAGNOSIS — Z13.6 ENCOUNTER FOR LIPID SCREENING FOR CARDIOVASCULAR DISEASE: ICD-10-CM

## 2025-01-14 DIAGNOSIS — I10 ESSENTIAL HYPERTENSION: ICD-10-CM

## 2025-01-14 LAB
ALBUMIN SERPL BCP-MCNC: 4.1 G/DL (ref 3.5–5.2)
ALP SERPL-CCNC: 62 U/L (ref 40–150)
ALT SERPL W/O P-5'-P-CCNC: 29 U/L (ref 10–44)
ANION GAP SERPL CALC-SCNC: 11 MMOL/L (ref 8–16)
AST SERPL-CCNC: 20 U/L (ref 10–40)
BILIRUB SERPL-MCNC: 0.3 MG/DL (ref 0.1–1)
BUN SERPL-MCNC: 9 MG/DL (ref 6–20)
CALCIUM SERPL-MCNC: 9.6 MG/DL (ref 8.7–10.5)
CHLORIDE SERPL-SCNC: 104 MMOL/L (ref 95–110)
CHOLEST SERPL-MCNC: 205 MG/DL (ref 120–199)
CHOLEST/HDLC SERPL: 5.3 {RATIO} (ref 2–5)
CO2 SERPL-SCNC: 23 MMOL/L (ref 23–29)
CREAT SERPL-MCNC: 0.7 MG/DL (ref 0.5–1.4)
EST. GFR  (NO RACE VARIABLE): >60 ML/MIN/1.73 M^2
ESTIMATED AVG GLUCOSE: 131 MG/DL (ref 68–131)
GLUCOSE SERPL-MCNC: 128 MG/DL (ref 70–110)
HBA1C MFR BLD: 6.2 % (ref 4–5.6)
HCV AB SERPL QL IA: NORMAL
HDLC SERPL-MCNC: 39 MG/DL (ref 40–75)
HDLC SERPL: 19 % (ref 20–50)
LDLC SERPL CALC-MCNC: ABNORMAL MG/DL (ref 63–159)
NONHDLC SERPL-MCNC: 166 MG/DL
POTASSIUM SERPL-SCNC: 4.4 MMOL/L (ref 3.5–5.1)
PROT SERPL-MCNC: 7 G/DL (ref 6–8.4)
SODIUM SERPL-SCNC: 138 MMOL/L (ref 136–145)
TRIGL SERPL-MCNC: 419 MG/DL (ref 30–150)

## 2025-01-14 PROCEDURE — 83036 HEMOGLOBIN GLYCOSYLATED A1C: CPT | Performed by: FAMILY MEDICINE

## 2025-01-14 PROCEDURE — 86803 HEPATITIS C AB TEST: CPT | Performed by: FAMILY MEDICINE

## 2025-01-14 PROCEDURE — 80061 LIPID PANEL: CPT | Performed by: FAMILY MEDICINE

## 2025-01-14 PROCEDURE — 80053 COMPREHEN METABOLIC PANEL: CPT | Performed by: FAMILY MEDICINE

## 2025-01-15 ENCOUNTER — OFFICE VISIT (OUTPATIENT)
Dept: OTOLARYNGOLOGY | Facility: CLINIC | Age: 25
End: 2025-01-15
Payer: COMMERCIAL

## 2025-01-15 VITALS — HEIGHT: 66 IN | WEIGHT: 255.31 LBS | BODY MASS INDEX: 41.03 KG/M2

## 2025-01-15 DIAGNOSIS — R04.0 EPISTAXIS: Primary | ICD-10-CM

## 2025-01-15 DIAGNOSIS — R04.0 BLEEDING NOSE: ICD-10-CM

## 2025-01-15 PROCEDURE — 99999 PR PBB SHADOW E&M-EST. PATIENT-LVL III: CPT | Mod: PBBFAC,,, | Performed by: OTOLARYNGOLOGY

## 2025-01-15 PROCEDURE — 3008F BODY MASS INDEX DOCD: CPT | Mod: CPTII,S$GLB,, | Performed by: OTOLARYNGOLOGY

## 2025-01-15 PROCEDURE — 99203 OFFICE O/P NEW LOW 30 MIN: CPT | Mod: 25,S$GLB,, | Performed by: OTOLARYNGOLOGY

## 2025-01-15 PROCEDURE — 3044F HG A1C LEVEL LT 7.0%: CPT | Mod: CPTII,S$GLB,, | Performed by: OTOLARYNGOLOGY

## 2025-01-15 PROCEDURE — 31238 NSL/SINS NDSC SRG NSL HEMRRG: CPT | Mod: LT,S$GLB,, | Performed by: OTOLARYNGOLOGY

## 2025-01-15 PROCEDURE — 1160F RVW MEDS BY RX/DR IN RCRD: CPT | Mod: CPTII,S$GLB,, | Performed by: OTOLARYNGOLOGY

## 2025-01-15 PROCEDURE — 1159F MED LIST DOCD IN RCRD: CPT | Mod: CPTII,S$GLB,, | Performed by: OTOLARYNGOLOGY

## 2025-01-15 NOTE — PROGRESS NOTES
Subjective:       Patient ID: Aysha Waters is a 24 y.o. female.    Chief Complaint: Epistaxis (Pt c/o ongoing nose bleeds . )      About 10 years ago this patient has a tonsillectomy done by Dr. Mcneill and at that time he cauterized some area of her nose for nosebleeds and general that was quite helpful however she has been having nosebleeds again on the left side recently a most recent bleed was Sunday which was three days ago.          Objective:      ENT Physical Exam    So her nasal exam shows a slight rightward septal deviation and initially I did not see any obvious site of bleeding I rubbed around fairly vigorously with a Q-tip in the anterior septum and there was no prominent or easy to identify site of bleeding.  With that in mind I decongested her nose with a cotton ball using Afrin and lidocaine sometimes that helps reveal sites of bleeding     This patient complains of nasal symptoms and anterior rhinoscopy with a head mirror does not provide an explanation for the symptoms.  In the interest of further defining abnormalities that may explain the patient's symptoms and in discussion with the patient about the options which include radiologic studies it was elected to perform nasal endoscopy during today's visit     The nasal cavity was sprayed with Afrin and lidocaine solution.  A flexible fiberoptic scope was chosen to perform the endoscopic exam as it was not anticipated that any type of debridement or manipulation would be required in this particular patient's situation.  The scope was advanced on the right side first, the nasal cavity was viewed in general, the middle turbinate and to the extent possible the middle meatus was examined as was the superior turbinate and superior meatus.  Inferior turbinate was obvious both during this endoscopic exam and to a great degree during the anterior rhino scopic exam.  The nasopharynx was examined as was the sphenoethmoid recess.  The exam was similarly  performed on the left side taking care to examine all of the structures of interest as mentioned.    Once the Afrin and lidocaine has been given an opportunity to decongest her nose it was examined and using the magnification of the scope the posterior nasal cavity was free of sites of bleeding no inflammation no lesions and eventually using a Q-tip to retract the lateral nasal soft tissues I was able to find a likely site of bleeding in the angle between the septum and the lower lateral cartilage.    This area was then cauterized with silver nitrate it did not bleed when I cauterized it but I do not see any other likely sites of bleeding          Assessment:       1. Epistaxis    2. Bleeding nose         Plan:          We discussed post cautery expectations and if this has not addressed her problem realizing that it may bleed a little bit in the next several days while the cautery site heals, I need it to see her back.

## 2025-01-23 ENCOUNTER — PATIENT MESSAGE (OUTPATIENT)
Dept: FAMILY MEDICINE | Facility: CLINIC | Age: 25
End: 2025-01-23
Payer: COMMERCIAL

## 2025-01-23 DIAGNOSIS — E28.2 PCOS (POLYCYSTIC OVARIAN SYNDROME): Primary | ICD-10-CM

## 2025-01-24 RX ORDER — METFORMIN HYDROCHLORIDE 500 MG/1
500 TABLET, EXTENDED RELEASE ORAL
Qty: 30 TABLET | Refills: 11 | Status: SHIPPED | OUTPATIENT
Start: 2025-01-24 | End: 2026-01-24

## 2025-02-05 ENCOUNTER — PATIENT MESSAGE (OUTPATIENT)
Dept: FAMILY MEDICINE | Facility: CLINIC | Age: 25
End: 2025-02-05
Payer: COMMERCIAL

## 2025-02-25 ENCOUNTER — PATIENT MESSAGE (OUTPATIENT)
Dept: OTOLARYNGOLOGY | Facility: CLINIC | Age: 25
End: 2025-02-25
Payer: COMMERCIAL

## 2025-03-03 ENCOUNTER — E-VISIT (OUTPATIENT)
Dept: FAMILY MEDICINE | Facility: CLINIC | Age: 25
End: 2025-03-03
Payer: COMMERCIAL

## 2025-03-03 ENCOUNTER — PATIENT MESSAGE (OUTPATIENT)
Dept: FAMILY MEDICINE | Facility: CLINIC | Age: 25
End: 2025-03-03

## 2025-03-03 DIAGNOSIS — J01.40 ACUTE NON-RECURRENT PANSINUSITIS: Primary | ICD-10-CM

## 2025-03-05 RX ORDER — CEFDINIR 300 MG/1
300 CAPSULE ORAL 2 TIMES DAILY
Qty: 20 CAPSULE | Refills: 0 | Status: SHIPPED | OUTPATIENT
Start: 2025-03-05 | End: 2025-03-15

## 2025-03-06 NOTE — PROGRESS NOTES
Patient ID: Aysha Waters is a 24 y.o. female.    Chief Complaint: General Illness (Entered automatically based on patient selection in Twitter.)    The patient initiated a request through Twitter on 3/3/2025 for evaluation and management with a chief complaint of General Illness (Entered automatically based on patient selection in Twitter.)     I evaluated the questionnaire submission on 3/5/25.    [facility] Evisit [facility]-Cough And Cold    3/3/2025  4:55 PM CST - Filed by Patient   What do you need help with? Sinus Infection   Do you agree to participate in an E-Visit? Yes   If you have any of the following symptoms, go to your local emergency room or call 911: I acknowledge   Do you have any of the following pregnancy-related conditions? None   What is the main issue you would like addressed today? Sinus congestion   Do you think you might have COVID-19 or the Flu? No   Have you tested positive for COVID-19 or Flu? No   What symptoms do you currently have?  Cough;  Stuffy nose;  Loss of taste or smell;  Runny nose;  Sore throat   Describe your cough: Contains mucus   Describe your mucus: Green;  Yellow   Have you had any trouble with your breathing, swollowing, or vision? None   Have you ever smoked? Never smoked   Have you had a fever? No   When did your symptoms first appear? 2/25/2025   In the last two weeks, have you been in close contact with someone who has COVID-19, the Flu, or strep throat? No   List what you have done or taken to help your symptoms. Over the counter medication   On a scale of 1-10, where 10 is the worst you can imagine, how severe are your symptoms? (range: 1 - 10) 4   Have your symptoms changed since they first started? No change   Do you have transportation to an [facility] location to get tested for COVID-19? Yes   Provide any additional information you feel is important.    Please attach any relevant images or files    Are you able to take your vital signs? No         Encounter  Diagnosis   Name Primary?    Acute non-recurrent pansinusitis Yes        No orders of the defined types were placed in this encounter.     Medications Ordered This Encounter   Medications    cefdinir (OMNICEF) 300 MG capsule     Sig: Take 1 capsule (300 mg total) by mouth 2 (two) times daily. for 10 days     Dispense:  20 capsule     Refill:  0        No follow-ups on file.      E-Visit Time Tracking:    Day 1 Time (in minutes): 5    Total Time (in minutes): 5

## 2025-03-10 ENCOUNTER — PATIENT MESSAGE (OUTPATIENT)
Dept: FAMILY MEDICINE | Facility: CLINIC | Age: 25
End: 2025-03-10
Payer: COMMERCIAL

## 2025-03-11 ENCOUNTER — OFFICE VISIT (OUTPATIENT)
Dept: OTOLARYNGOLOGY | Facility: CLINIC | Age: 25
End: 2025-03-11
Payer: COMMERCIAL

## 2025-03-11 ENCOUNTER — TELEPHONE (OUTPATIENT)
Dept: FAMILY MEDICINE | Facility: CLINIC | Age: 25
End: 2025-03-11
Payer: COMMERCIAL

## 2025-03-11 VITALS — BODY MASS INDEX: 41.03 KG/M2 | WEIGHT: 255.31 LBS | HEIGHT: 66 IN

## 2025-03-11 DIAGNOSIS — R04.0 EPISTAXIS: Primary | ICD-10-CM

## 2025-03-11 PROCEDURE — 30901 CONTROL OF NOSEBLEED: CPT | Mod: RT,S$GLB,, | Performed by: OTOLARYNGOLOGY

## 2025-03-11 PROCEDURE — 99999 PR PBB SHADOW E&M-EST. PATIENT-LVL III: CPT | Mod: PBBFAC,,, | Performed by: OTOLARYNGOLOGY

## 2025-03-11 PROCEDURE — 99213 OFFICE O/P EST LOW 20 MIN: CPT | Mod: 25,S$GLB,, | Performed by: OTOLARYNGOLOGY

## 2025-03-11 PROCEDURE — 1159F MED LIST DOCD IN RCRD: CPT | Mod: CPTII,S$GLB,, | Performed by: OTOLARYNGOLOGY

## 2025-03-11 PROCEDURE — 3008F BODY MASS INDEX DOCD: CPT | Mod: CPTII,S$GLB,, | Performed by: OTOLARYNGOLOGY

## 2025-03-11 PROCEDURE — 3044F HG A1C LEVEL LT 7.0%: CPT | Mod: CPTII,S$GLB,, | Performed by: OTOLARYNGOLOGY

## 2025-03-11 PROCEDURE — 1160F RVW MEDS BY RX/DR IN RCRD: CPT | Mod: CPTII,S$GLB,, | Performed by: OTOLARYNGOLOGY

## 2025-03-11 NOTE — TELEPHONE ENCOUNTER
Pt was advised to stop EMGALITY  by Dr. Ball d/t local reaction. Appt schedule for follow up. Pt voiced understanding

## 2025-03-11 NOTE — TELEPHONE ENCOUNTER
----- Message from Ruthie sent at 3/11/2025  1:54 PM CDT -----  Type:  Sooner Appointment RequestCaller is requesting a sooner appointment.  Name of Caller:pt When is the first available appointment?May Symptoms:possible allergic reaction to medsrash and hardness at inj site Would the patient rather a call back or a response via [facility]? Please call Best Call Back Number:[phone] Additional Information:     Please call back to advise. Thanks!

## 2025-03-11 NOTE — PROGRESS NOTES
"Subjective:       Patient ID: Aysha Waters is a 24 y.o. female.    Chief Complaint: Epistaxis (Patient recently had left side of the nose cauterized for epistaxis and states "ever since the left was done the right has started and it takes about 15 minutes to get It to stop. It happens like three or four times a week")      I saw this patient recently for epistaxis and we cauterized an area on the left and she tells me except for being a little sore for the week after cautery it has done fine and has not bled since.  Now she is having right-sided nosebleeds several times a week in the last 15 minutes at a time.      As a reminder historically she has has a long problem with nosebleeds and Dr. Mcneill has cauterized her nose on both sides various times over the years with success but obviously eventual return of bleeding.          Objective:      ENT Physical Exam    On the right side she has surprisingly symmetric issue from what we recently addressed on the left.  Anterior septum is scarified from historic cauteries by Dr. Mcneill that were effective but there has a vessel just superior and posterior to this area of previous cautery and there has a vessel there that looks highly suspicious has a likely site of bleeding.    After allowing cotton ball with lidocaine and Afrin to sit against this area the area was cauterized and ones specific spot bled during cautery suggesting this was the site of bleeding and I do not see any other likely sites.        Assessment:       1. Epistaxis         Plan:          She is very familiar with cautery the most recent one on the left was successful hopefully this will be to an we have discussed post cautery care        "

## 2025-03-12 ENCOUNTER — PATIENT MESSAGE (OUTPATIENT)
Dept: OTOLARYNGOLOGY | Facility: CLINIC | Age: 25
End: 2025-03-12
Payer: COMMERCIAL

## 2025-03-17 ENCOUNTER — OFFICE VISIT (OUTPATIENT)
Dept: FAMILY MEDICINE | Facility: CLINIC | Age: 25
End: 2025-03-17
Payer: COMMERCIAL

## 2025-03-17 VITALS
OXYGEN SATURATION: 98 % | RESPIRATION RATE: 18 BRPM | WEIGHT: 257.38 LBS | HEART RATE: 102 BPM | HEIGHT: 66 IN | DIASTOLIC BLOOD PRESSURE: 88 MMHG | SYSTOLIC BLOOD PRESSURE: 138 MMHG | BODY MASS INDEX: 41.37 KG/M2

## 2025-03-17 DIAGNOSIS — G43.709 CHRONIC MIGRAINE WITHOUT AURA WITHOUT STATUS MIGRAINOSUS, NOT INTRACTABLE: Primary | ICD-10-CM

## 2025-03-17 PROCEDURE — 3079F DIAST BP 80-89 MM HG: CPT | Mod: CPTII,S$GLB,, | Performed by: FAMILY MEDICINE

## 2025-03-17 PROCEDURE — 3044F HG A1C LEVEL LT 7.0%: CPT | Mod: CPTII,S$GLB,, | Performed by: FAMILY MEDICINE

## 2025-03-17 PROCEDURE — 3008F BODY MASS INDEX DOCD: CPT | Mod: CPTII,S$GLB,, | Performed by: FAMILY MEDICINE

## 2025-03-17 PROCEDURE — 3075F SYST BP GE 130 - 139MM HG: CPT | Mod: CPTII,S$GLB,, | Performed by: FAMILY MEDICINE

## 2025-03-17 PROCEDURE — 99999 PR PBB SHADOW E&M-EST. PATIENT-LVL III: CPT | Mod: PBBFAC,,, | Performed by: FAMILY MEDICINE

## 2025-03-17 PROCEDURE — 99214 OFFICE O/P EST MOD 30 MIN: CPT | Mod: S$GLB,,, | Performed by: FAMILY MEDICINE

## 2025-03-17 PROCEDURE — 1159F MED LIST DOCD IN RCRD: CPT | Mod: CPTII,S$GLB,, | Performed by: FAMILY MEDICINE

## 2025-03-17 PROCEDURE — 1160F RVW MEDS BY RX/DR IN RCRD: CPT | Mod: CPTII,S$GLB,, | Performed by: FAMILY MEDICINE

## 2025-03-17 RX ORDER — ERENUMAB-AOOE 70 MG/ML
70 INJECTION SUBCUTANEOUS
Qty: 1 ML | Refills: 3 | Status: SHIPPED | OUTPATIENT
Start: 2025-03-17

## 2025-03-17 NOTE — PROGRESS NOTES
"PA Information- Did great on Emgality- started having hives at injection site. Will trial a drug change.   Subjective:       Patient ID: Aysha Waters is a 24 y.o. female.    Chief Complaint: Medication Reaction (Emgality Pen-Started off with no reaction. First patch on stomach moved the right thigh and a rash happened there but was more intense. )    History of Present Illness    CHIEF COMPLAINT:  Ms. Waters presents today with injection site reaction from migraine medication.    MIGRAINE MANAGEMENT:  She reports progressively worsening reactions to Emgality 120mg monthly injections. Initially, she experienced no reactions for the first couple of months, then developed mild symptoms that have intensified with each subsequent dose. Currently, she has an injection site reaction that has persisted for 13 days without improvement.    BLOOD PRESSURE:  She reports elevated blood pressure readings today which she attributes to having a stressful day.    MEDICATIONS:  She currently takes Metformin with good tolerance, decreased from previous dose of 2000mg daily.      ROS:  Skin: +rash         Review of Systems   Constitutional:  Negative for activity change, appetite change, fatigue and fever.   Respiratory:  Negative for shortness of breath.    Gastrointestinal:  Negative for abdominal pain.   Integumentary:  Negative for rash.       Problem List[1]  Aysha has a current medication list which includes the following prescription(s): fluoxetine, ibuprofen, metformin, norgestimate-ethinyl estradiol, promethazine, promethazine-dextromethorphan, aimovig autoinjector, sumatriptan, and topiramate.        Health Maintenance Due   Topic Date Due    HIV Screening  Never done    Chlamydia Screening  06/23/2022      Health Maintenance reviewed and discussed  Objective:      Vitals:    03/17/25 1521 03/17/25 1552   BP: (!) 154/90 138/88   Pulse: 102    Resp: 18    SpO2: 98%    Weight: 116.8 kg (257 lb 6.4 oz)    Height: 5' 6" (1.676 " m)    PainSc: 0-No pain      Body mass index is 41.55 kg/m².  Physical Exam  Vitals and nursing note reviewed.   Constitutional:       General: She is not in acute distress.     Appearance: She is not ill-appearing.   Cardiovascular:      Rate and Rhythm: Normal rate and regular rhythm.      Heart sounds: No murmur heard.  Pulmonary:      Effort: Pulmonary effort is normal.      Breath sounds: Normal breath sounds. No wheezing.   Skin:     General: Skin is warm and dry.      Findings: No rash.   Neurological:      Mental Status: She is alert.   Psychiatric:         Mood and Affect: Mood normal.         Behavior: Behavior normal.         Assessment:       Assessment & Plan    1. Assessed reaction to Emgality injections, noting worsening rash and hives over time.  2. Switched from Emgality to Aimovig, another CGRP inhibitor, to address adverse reactions while maintaining migraine prevention.  3. Considered potential for drug class effect if similar reaction to Aimovig occurs.  4. Noted slightly elevated blood pressure (154/90), plan to recheck and monitor.  5. Evaluated response to current Metformin regimen, noting improved tolerance with extended-release formulation at lower dose.           Plan:       1. Chronic migraine without aura without status migrainosus, not intractable  -     erenumab-aooe (AIMOVIG AUTOINJECTOR) 70 mg/mL autoinjector; Inject 1 mL (70 mg total) into the skin every 28 days.  Dispense: 1 mL; Refill: 3         This note was generated with the assistance of ambient listening technology. Verbal consent was obtained by the patient and accompanying visitor(s) for the recording of patient appointment to facilitate this note. I attest to having reviewed and edited the generated note for accuracy, though some syntax or spelling errors may persist. Please contact the author of this note for any clarification.         [1]   Patient Active Problem List  Diagnosis    Acanthosis nigricans    Obesity     Essential hypertension    Lumbar disc herniation    Ruptured lumbar intervertebral disc    Fever    CSF leak    Acute cystitis without hematuria    Chronic migraine without aura without status migrainosus, not intractable

## 2025-03-24 ENCOUNTER — TELEPHONE (OUTPATIENT)
Dept: FAMILY MEDICINE | Facility: CLINIC | Age: 25
End: 2025-03-24
Payer: COMMERCIAL

## 2025-04-03 ENCOUNTER — PATIENT MESSAGE (OUTPATIENT)
Dept: FAMILY MEDICINE | Facility: CLINIC | Age: 25
End: 2025-04-03
Payer: COMMERCIAL

## 2025-04-28 ENCOUNTER — PATIENT MESSAGE (OUTPATIENT)
Dept: FAMILY MEDICINE | Facility: CLINIC | Age: 25
End: 2025-04-28

## 2025-04-28 ENCOUNTER — OFFICE VISIT (OUTPATIENT)
Dept: FAMILY MEDICINE | Facility: CLINIC | Age: 25
End: 2025-04-28
Payer: COMMERCIAL

## 2025-04-28 VITALS
RESPIRATION RATE: 18 BRPM | OXYGEN SATURATION: 98 % | DIASTOLIC BLOOD PRESSURE: 86 MMHG | WEIGHT: 257 LBS | HEIGHT: 66 IN | HEART RATE: 104 BPM | SYSTOLIC BLOOD PRESSURE: 138 MMHG | BODY MASS INDEX: 41.3 KG/M2

## 2025-04-28 DIAGNOSIS — G43.709 CHRONIC MIGRAINE WITHOUT AURA WITHOUT STATUS MIGRAINOSUS, NOT INTRACTABLE: ICD-10-CM

## 2025-04-28 DIAGNOSIS — E28.2 POLYCYSTIC OVARY SYNDROME: ICD-10-CM

## 2025-04-28 DIAGNOSIS — R73.03 PREDIABETES: Primary | ICD-10-CM

## 2025-04-28 PROCEDURE — 3044F HG A1C LEVEL LT 7.0%: CPT | Mod: CPTII,S$GLB,, | Performed by: FAMILY MEDICINE

## 2025-04-28 PROCEDURE — 3075F SYST BP GE 130 - 139MM HG: CPT | Mod: CPTII,S$GLB,, | Performed by: FAMILY MEDICINE

## 2025-04-28 PROCEDURE — 1160F RVW MEDS BY RX/DR IN RCRD: CPT | Mod: CPTII,S$GLB,, | Performed by: FAMILY MEDICINE

## 2025-04-28 PROCEDURE — 99999 PR PBB SHADOW E&M-EST. PATIENT-LVL IV: CPT | Mod: PBBFAC,,, | Performed by: FAMILY MEDICINE

## 2025-04-28 PROCEDURE — 3008F BODY MASS INDEX DOCD: CPT | Mod: CPTII,S$GLB,, | Performed by: FAMILY MEDICINE

## 2025-04-28 PROCEDURE — 3079F DIAST BP 80-89 MM HG: CPT | Mod: CPTII,S$GLB,, | Performed by: FAMILY MEDICINE

## 2025-04-28 PROCEDURE — 1159F MED LIST DOCD IN RCRD: CPT | Mod: CPTII,S$GLB,, | Performed by: FAMILY MEDICINE

## 2025-04-28 PROCEDURE — 99214 OFFICE O/P EST MOD 30 MIN: CPT | Mod: S$GLB,,, | Performed by: FAMILY MEDICINE

## 2025-04-28 RX ORDER — ERENUMAB-AOOE 140 MG/ML
140 INJECTION, SOLUTION SUBCUTANEOUS
Qty: 1 ML | Refills: 3 | Status: SHIPPED | OUTPATIENT
Start: 2025-04-28

## 2025-04-28 NOTE — PROGRESS NOTES
"  Subjective:       Patient ID: Aysha Waters is a 24 y.o. female.    Chief Complaint: Medication Problem (Aimovig isn't working for her would like to discuss other options. )    History of Present Illness    CHIEF COMPLAINT:  Ms. Waters presents today for follow up of headaches    HEADACHE HISTORY:  She experiences headaches 3-4 times per week, which she describes as bothersome but not severe. She currently manages with ibuprofen 800 mg twice daily. She previously tried Emgality but discontinued due to progressively worsening injection site reactions. She is currently taking Aimovig, and prior trial of Topamax was ineffective.    GI CONCERNS:  She reports constipation with bowel movements occurring every 3-4 days. This is a side effect of Aimovig. She currently uses Dulcolax for management and has previously tried Miralax and magnesium citrate.      ROS:  General: +excessive thirst, +excessive urination  Gastrointestinal: +constipation  Neurological: +headache  Psychiatric: +anxiety         Review of Systems   Constitutional:  Negative for activity change, appetite change, fatigue and fever.   Respiratory:  Negative for shortness of breath.    Gastrointestinal:  Negative for abdominal pain.   Integumentary:  Negative for rash.   Neurological:  Positive for headaches.       Problem List[1]  Aysha has a current medication list which includes the following prescription(s): fluoxetine, ibuprofen, metformin, norgestimate-ethinyl estradiol, promethazine, and aimovig autoinjector.        Health Maintenance Due   Topic Date Due    HIV Screening  Never done    Chlamydia Screening  06/23/2022      Health Maintenance reviewed and discussed.   Objective:      Vitals:    04/28/25 1301 04/28/25 1328   BP: (!) 152/80 138/86   Pulse: 104    Resp: 18    SpO2: 98%    Weight: 116.6 kg (257 lb)    Height: 5' 6" (1.676 m)    PainSc: 0-No pain      Body mass index is 41.48 kg/m².  Physical Exam  Vitals and nursing note reviewed. "   Constitutional:       General: She is not in acute distress.     Appearance: She is not ill-appearing.   Cardiovascular:      Rate and Rhythm: Normal rate and regular rhythm.      Heart sounds: No murmur heard.  Pulmonary:      Effort: Pulmonary effort is normal.      Breath sounds: Normal breath sounds. No wheezing.   Skin:     General: Skin is warm and dry.      Findings: No rash.   Neurological:      Mental Status: She is alert.   Psychiatric:         Mood and Affect: Mood normal.         Behavior: Behavior normal.               Assessment:       Assessment & Plan    1. Evaluated alternative injectable CGRP antagonists, noting Ajovy as an option if covered by insurance.  2. Assessed oral CGRP antagonists, but deemed unsuitable due to limited coverage for frequent use.  3. Reviewed history with Emgality, noting efficacy but worsening injection site reactions.  4. Increased Aimovig dose from 70 mg to 140 mg to address persistent headaches occurring 3-4 times per week.           Plan:       1. Prediabetes  -     Hemoglobin A1C; Future; Expected date: 04/28/2025    2. Chronic migraine without aura without status migrainosus, not intractable  -     erenumab-aooe (AIMOVIG AUTOINJECTOR) 140 mg/mL autoinjector; Inject 1 mL (140 mg total) into the skin every 28 days.  Dispense: 1 mL; Refill: 3    3. Polycystic ovary syndrome  -     Hemoglobin A1C; Future; Expected date: 04/28/2025       Will increase Aimovig to 140mg.   Bowel regimen.     This note was generated with the assistance of ambient listening technology. Verbal consent was obtained by the patient and accompanying visitor(s) for the recording of patient appointment to facilitate this note. I attest to having reviewed and edited the generated note for accuracy, though some syntax or spelling errors may persist. Please contact the author of this note for any clarification.         [1]   Patient Active Problem List  Diagnosis    Acanthosis nigricans    Obesity     Essential hypertension    Lumbar disc herniation    Ruptured lumbar intervertebral disc    Fever    CSF leak    Acute cystitis without hematuria    Chronic migraine without aura without status migrainosus, not intractable    Polycystic ovary syndrome

## 2025-04-28 NOTE — PATIENT INSTRUCTIONS
Constipation regimen  Daily miralax.   Daily colace.     If go > 3 days  Dulcolax suppository    Can consider daily senna but this will make you cramp if constipated.     Continue to drink plenty of water.     Thank you for allowing me to participate in your care today. It is an honor to be a part of your healthcare team at [facility]. If you had labs ordered today, you will receive notification via [employer], phone call or mailed letter regarding your results within 7 days. If you have any questions or concerns regarding your visit today, please do not hesitate to contact us.  Sincerely,   Arelis Diaz M.D.

## 2025-05-02 ENCOUNTER — LAB VISIT (OUTPATIENT)
Dept: LAB | Facility: HOSPITAL | Age: 25
End: 2025-05-02
Attending: FAMILY MEDICINE
Payer: COMMERCIAL

## 2025-05-02 DIAGNOSIS — R73.03 PREDIABETES: ICD-10-CM

## 2025-05-02 DIAGNOSIS — E28.2 POLYCYSTIC OVARY SYNDROME: ICD-10-CM

## 2025-05-02 LAB
EAG (OHS): 114 MG/DL (ref 68–131)
HBA1C MFR BLD: 5.6 % (ref 4–5.6)

## 2025-05-02 PROCEDURE — 36415 COLL VENOUS BLD VENIPUNCTURE: CPT

## 2025-05-02 PROCEDURE — 83036 HEMOGLOBIN GLYCOSYLATED A1C: CPT

## 2025-05-04 ENCOUNTER — RESULTS FOLLOW-UP (OUTPATIENT)
Dept: FAMILY MEDICINE | Facility: CLINIC | Age: 25
End: 2025-05-04

## 2025-05-13 ENCOUNTER — PATIENT MESSAGE (OUTPATIENT)
Dept: FAMILY MEDICINE | Facility: CLINIC | Age: 25
End: 2025-05-13
Payer: COMMERCIAL

## 2025-05-13 NOTE — LETTER
May 23, 2025    ATTN: Belk[facility]  Regarding patient:   Aysha Waters             [address]  [place] [address]  Phone: [phone]  Fax: [phone]   Patient: Aysha Waters   MR Number: [id number]   YOB: 2000   Date of Visit: 5/13/2025       To whom it may concern,    I would like to appeal the decision to not provide Ms. Aysha Waters with the 140mg dosing of her Aimovig. In order for her to be productive and have reduction in migraine days that this is medically necessary for her.     She has tried many different medications for her migraine headaches including topamax and propranolol.     She had good relief with Emgality but had an extensive and worsening local reaction. We switched to Aimovig and while she has not had the severe local reaction she experienced with Emgality she also has not had a significant reduction in headache days. Recommend transitioning to the increased dose Aimovig prior to ruling out this medication.     Thank you in advance for your consideration.     Sincerely,      Arelis Diaz MD           CC  No Recipients

## 2025-05-20 ENCOUNTER — TELEPHONE (OUTPATIENT)
Dept: FAMILY MEDICINE | Facility: CLINIC | Age: 25
End: 2025-05-20

## 2025-05-20 NOTE — TELEPHONE ENCOUNTER
Spoke with pharmacy. They are faxing a paper form to be filled out. She states the system is still saying that a PA ir required but has been initiated. I let her know we have an approval on our end. She states it is not reflected in their system she is sending over a paper prior authorization to be filled out. Currently waiting for fax as of 0925.

## 2025-05-28 ENCOUNTER — OFFICE VISIT (OUTPATIENT)
Dept: FAMILY MEDICINE | Facility: CLINIC | Age: 25
End: 2025-05-28
Payer: COMMERCIAL

## 2025-05-28 ENCOUNTER — LAB VISIT (OUTPATIENT)
Dept: LAB | Facility: HOSPITAL | Age: 25
End: 2025-05-28
Attending: FAMILY MEDICINE
Payer: COMMERCIAL

## 2025-05-28 ENCOUNTER — PATIENT MESSAGE (OUTPATIENT)
Dept: FAMILY MEDICINE | Facility: CLINIC | Age: 25
End: 2025-05-28

## 2025-05-28 VITALS
HEART RATE: 102 BPM | BODY MASS INDEX: 40.76 KG/M2 | DIASTOLIC BLOOD PRESSURE: 84 MMHG | SYSTOLIC BLOOD PRESSURE: 130 MMHG | WEIGHT: 253.63 LBS | HEIGHT: 66 IN | RESPIRATION RATE: 18 BRPM | OXYGEN SATURATION: 98 %

## 2025-05-28 DIAGNOSIS — E28.2 POLYCYSTIC OVARY SYNDROME: Primary | ICD-10-CM

## 2025-05-28 DIAGNOSIS — E28.2 POLYCYSTIC OVARY SYNDROME: ICD-10-CM

## 2025-05-28 DIAGNOSIS — E78.1 HYPERTRIGLYCERIDEMIA: ICD-10-CM

## 2025-05-28 DIAGNOSIS — Z13.6 ENCOUNTER FOR LIPID SCREENING FOR CARDIOVASCULAR DISEASE: ICD-10-CM

## 2025-05-28 DIAGNOSIS — Z13.220 ENCOUNTER FOR LIPID SCREENING FOR CARDIOVASCULAR DISEASE: ICD-10-CM

## 2025-05-28 DIAGNOSIS — R61 HYPERHIDROSIS: ICD-10-CM

## 2025-05-28 DIAGNOSIS — R68.89 HEAT INTOLERANCE: ICD-10-CM

## 2025-05-28 DIAGNOSIS — G43.709 CHRONIC MIGRAINE WITHOUT AURA WITHOUT STATUS MIGRAINOSUS, NOT INTRACTABLE: ICD-10-CM

## 2025-05-28 LAB
CHOLEST SERPL-MCNC: 676 MG/DL (ref 120–199)
CHOLEST/HDLC SERPL: 26 {RATIO} (ref 2–5)
ESTRADIOL SERPL HS-MCNC: 25 PG/ML
FSH SERPL-ACNC: 3.34 MIU/ML
HDLC SERPL-MCNC: 26 MG/DL (ref 40–75)
HDLC SERPL: 3.8 % (ref 20–50)
LDH SERPL-CCNC: 184 U/L (ref 110–260)
LDLC SERPL CALC-MCNC: ABNORMAL MG/DL
NONHDLC SERPL-MCNC: 650 MG/DL
PROLACTIN SERPL IA-MCNC: 7.1 NG/ML (ref 5.2–26.5)
TRIGL SERPL-MCNC: >3600 MG/DL (ref 30–150)
TSH SERPL-ACNC: 4.39 UIU/ML (ref 0.4–4)

## 2025-05-28 PROCEDURE — 83701 LIPOPROTEIN BLD HR FRACTION: CPT

## 2025-05-28 PROCEDURE — 1159F MED LIST DOCD IN RCRD: CPT | Mod: CPTII,S$GLB,, | Performed by: FAMILY MEDICINE

## 2025-05-28 PROCEDURE — 99214 OFFICE O/P EST MOD 30 MIN: CPT | Mod: S$GLB,,, | Performed by: FAMILY MEDICINE

## 2025-05-28 PROCEDURE — 80061 LIPID PANEL: CPT

## 2025-05-28 PROCEDURE — 83001 ASSAY OF GONADOTROPIN (FSH): CPT

## 2025-05-28 PROCEDURE — 3079F DIAST BP 80-89 MM HG: CPT | Mod: CPTII,S$GLB,, | Performed by: FAMILY MEDICINE

## 2025-05-28 PROCEDURE — 3044F HG A1C LEVEL LT 7.0%: CPT | Mod: CPTII,S$GLB,, | Performed by: FAMILY MEDICINE

## 2025-05-28 PROCEDURE — 84403 ASSAY OF TOTAL TESTOSTERONE: CPT

## 2025-05-28 PROCEDURE — 36415 COLL VENOUS BLD VENIPUNCTURE: CPT

## 2025-05-28 PROCEDURE — 84443 ASSAY THYROID STIM HORMONE: CPT

## 2025-05-28 PROCEDURE — 82670 ASSAY OF TOTAL ESTRADIOL: CPT

## 2025-05-28 PROCEDURE — 83615 LACTATE (LD) (LDH) ENZYME: CPT

## 2025-05-28 PROCEDURE — 99999 PR PBB SHADOW E&M-EST. PATIENT-LVL III: CPT | Mod: PBBFAC,,, | Performed by: FAMILY MEDICINE

## 2025-05-28 PROCEDURE — 83498 ASY HYDROXYPROGESTERONE 17-D: CPT

## 2025-05-28 PROCEDURE — 3008F BODY MASS INDEX DOCD: CPT | Mod: CPTII,S$GLB,, | Performed by: FAMILY MEDICINE

## 2025-05-28 PROCEDURE — 3075F SYST BP GE 130 - 139MM HG: CPT | Mod: CPTII,S$GLB,, | Performed by: FAMILY MEDICINE

## 2025-05-28 PROCEDURE — 84146 ASSAY OF PROLACTIN: CPT

## 2025-05-28 RX ORDER — PROPRANOLOL HYDROCHLORIDE 10 MG/1
TABLET ORAL
Qty: 175 TABLET | Refills: 0 | Status: SHIPPED | OUTPATIENT
Start: 2025-05-28 | End: 2025-06-25

## 2025-05-28 NOTE — PLAN OF CARE
Chief Complaint  Annual Exam, Anxiety, and Depression    Subjective            Zaria Santacruz presents to [facility]  History of Present Illness  Pt is here for an annual CPE. Pt would like to discuss starting a medication for anxiety/depression.  She is going through a tough divorce, she has 9 children, and she is in the process of getting a new job. Pt has not been on any medication for this in the past. Pt is open to PRN or QD use. She has no other issues or concerns at this time.     Pt is due labs/orders placed.    Pap: hysterectomy.    Screening Colonoscopy: due, orders placed    Breast MRI 24/repeat mammogram in 1 year            Past Medical History:   Diagnosis Date    Aftercare 2018    FOLLOWING EXCISION OF LEFT ANTEROLATERAL ANKLY CYST 2018    Aftercare 2018    FOLLOWING LEFT ANKLE CYST EXCISION    Mass of ankle 2018    Vein disorder        Allergies   Allergen Reactions    Hydrocodone GI Intolerance        Past Surgical History:   Procedure Laterality Date    BARIATRIC SURGERY  2013    CYST REMOVAL Right     ANKLE    GASTRIC BYPASS      HYSTERECTOMY      PARTIAL     KNEE ACL RECONSTRUCTION Left         Social History     Tobacco Use    Smoking status: Former     Current packs/day: 0.00     Average packs/day: 0.5 packs/day for 17.0 years (8.5 ttl pk-yrs)     Types: Cigarettes     Start date:      Quit date:      Years since quittin.4     Passive exposure: Never    Smokeless tobacco: Never    Tobacco comments:     AGES [age]   Substance Use Topics    Alcohol use: Yes     Alcohol/week: 0.0 standard drinks of alcohol     Comment: OCCASIONALLY       Family History   Problem Relation Age of Onset    Stroke Father     Skin cancer Maternal Grandmother     Heart disease Maternal Grandfather     Diabetes Maternal Grandfather     Heart disease Paternal Grandfather     Colon cancer Neg Hx         Current Outpatient  Goals to be met by: 20     Patient will increase functional independence with mobility by performin. Supine to sit with Stand-by Assistance  2. Sit to stand transfer with Stand-by Assistance  3. Bed to chair transfer with Contact Guard Assistance using Rolling Walker  4. Gait  x 100 feet with Contact Guard Assistance using Rolling Walker.      "Medications on File Prior to Visit   Medication Sig    naproxen (EC NAPROSYN) 500 MG EC tablet Take 1 tablet by mouth 2 (Two) Times a Day With Meals.     No current facility-administered medications on file prior to visit.       Health Maintenance Due   Topic Date Due    COLORECTAL CANCER SCREENING  Never done    ANNUAL PHYSICAL  04/09/2025       Objective     /68   Pulse 58   Ht 165.1 cm (65\")   Wt 76.2 kg (168 lb)   SpO2 99%   BMI 27.96 kg/m²       Physical Exam  Constitutional:       General: She is not in acute distress.     Appearance: Normal appearance. She is not ill-appearing.   HENT:      Head: Normocephalic and atraumatic.      Right Ear: Tympanic membrane, ear canal and external ear normal.      Left Ear: Tympanic membrane, ear canal and external ear normal.      Nose: Nose normal.   Cardiovascular:      Rate and Rhythm: Normal rate and regular rhythm.      Heart sounds: Normal heart sounds. No murmur heard.  Pulmonary:      Effort: Pulmonary effort is normal. No respiratory distress.      Breath sounds: Normal breath sounds.   Chest:      Chest wall: No tenderness.   Abdominal:      General: Abdomen is flat. Bowel sounds are normal. There is no distension.      Palpations: Abdomen is soft. There is no mass.      Tenderness: There is no abdominal tenderness. There is no guarding.   Musculoskeletal:         General: No swelling or tenderness. Normal range of motion.      Cervical back: Normal range of motion and neck supple.   Skin:     General: Skin is warm and dry.      Findings: No rash.   Neurological:      General: No focal deficit present.      Mental Status: She is alert and oriented to person, place, and time. Mental status is at baseline.      Gait: Gait normal.   Psychiatric:         Attention and Perception: Attention normal.         Mood and Affect: Mood and affect normal.         Speech: Speech normal.         Behavior: Behavior normal. Behavior is cooperative.         Thought " Content: Thought content normal. Thought content does not include suicidal ideation.         Judgment: Judgment normal.           Result Review :                           Assessment and Plan        Diagnoses and all orders for this visit:    1. Annual physical exam (Primary)  -     CBC w AUTO Differential; Future  -     Comprehensive metabolic panel; Future  -     TSH; Future  -     Ambulatory Referral For Screening Colonoscopy    2. Anxiety  -     escitalopram (Lexapro) 20 MG tablet; Take 1/2 tab (10mg) daily for 2 weeks then 1 tab 20mg daily  Dispense: 90 tablet; Refill: 0    3. Major depressive disorder, recurrent, mild  -     escitalopram (Lexapro) 20 MG tablet; Take 1/2 tab (10mg) daily for 2 weeks then 1 tab 20mg daily  Dispense: 90 tablet; Refill: 0    4. Screening for cholesterol level  -     Comprehensive metabolic panel; Future  -     Lipid panel; Future    5. Screening for thyroid disorder  -     TSH; Future    6. Screening for colon cancer  -     Ambulatory Referral For Screening Colonoscopy      Preventative Counseling:  Advised monthly Self breast exams  Healthy diet  Daily exercise        Follow Up     Return in about 1 month (around 6/28/2025), or if symptoms worsen or fail to improve.    Patient was given instructions and counseling regarding her condition or for health maintenance advice. Please see specific information pulled into the AVS if appropriate.     Zaria Santacruz  reports that she quit smoking about 12 years ago. Her smoking use included cigarettes. She started smoking about 29 years ago. She has a 8.5 pack-year smoking history. She has never been exposed to tobacco smoke. She has never used smokeless tobacco.        NABIL Remy

## 2025-05-28 NOTE — PROGRESS NOTES
Subjective:       Patient ID: Aysha Waters is a 24 y.o. female.    Chief Complaint: PCOS    History of Present Illness    CHIEF COMPLAINT:  Ms. Waters presents today for follow up of PCOS and temperature regulation concerns.    TEMPERATURE DYSREGULATION:  She reports longstanding history of being hot-natured with significant worsening since the beginning of the year. She experiences excessive sweating episodes where her arms and legs become soaking wet. During a recent awards ceremony, she experienced profuse sweating despite the venue not being excessively hot, with significant sweating under her clothing including biker shorts.    DERMATOLOGIC:  She reports red, inflamed bumps on the back of thighs that progress to painful sores.    GASTROINTESTINAL:  She experienced nausea and acid reflux after meals throughout the previous week. In response, she initiated a Weight Wat[employer] program to address her eating habits.    WEIGHT MANAGEMENT:  She reports recent weight loss attributed to reduced intake and following a point system diet for the past three days.    MEDICATIONS:  She currently takes birth control, Metformin (denies stomach upset), and Fluoxetine. She reports history of rash with Lamictal.      ROS:  General: +heat intolerance, +cold intolerance  Gastrointestinal: -nausea  Skin: +rash, +lesion, +excessive sweating, +sores, +skin redness  Neurological: +headache         Review of Systems   Constitutional:  Negative for activity change, appetite change, fatigue and fever.   Respiratory:  Negative for shortness of breath.    Gastrointestinal:  Negative for abdominal pain.   Integumentary:  Negative for rash.       Problem List[1]  Aysha has a current medication list which includes the following prescription(s): fluoxetine, ibuprofen, metformin, norgestimate-ethinyl estradiol, promethazine, aimovig autoinjector, and propranolol.        Health Maintenance Due   Topic Date Due    HIV Screening  Never done     "Chlamydia Screening  06/23/2022      Health Maintenance reviewed and discussed.   Objective:      Vitals:    05/28/25 0703   BP: 130/84   Pulse: 102   Resp: 18   SpO2: 98%   Weight: 115 kg (253 lb 9.6 oz)   Height: 5' 6" (1.676 m)   PainSc: 0-No pain     Body mass index is 40.93 kg/m².  Physical Exam  Vitals and nursing note reviewed.   Constitutional:       General: She is not in acute distress.     Appearance: She is not ill-appearing.   Cardiovascular:      Rate and Rhythm: Normal rate and regular rhythm.      Heart sounds: No murmur heard.  Pulmonary:      Effort: Pulmonary effort is normal.      Breath sounds: Normal breath sounds. No wheezing.   Skin:     General: Skin is warm and dry.      Findings: No rash.   Neurological:      Mental Status: She is alert.   Psychiatric:         Mood and Affect: Mood normal.         Behavior: Behavior normal.         Assessment:       Assessment & Plan    1. Weighed options for further PCOS management, including potential referral to gynecology specialist.  2. Explained hyperhidrosis as a possible cause for excessive sweating.           Plan:       1. Polycystic ovary syndrome  -     Estradiol; Future; Expected date: 05/28/2025  -     Prolactin; Future; Expected date: 05/28/2025  -     Lactate Dehydrogenase; Future; Expected date: 05/28/2025  -     Testosterone Panel; Future; Expected date: 05/28/2025  -     17-Hydroxyprogesterone; Future; Expected date: 05/28/2025  -     Follicle Stimulating Hormone; Future; Expected date: 05/28/2025  -     TSH; Future; Expected date: 05/28/2025    2. Heat intolerance    3. Hyperhidrosis    4. Encounter for lipid screening for cardiovascular disease    5. Chronic migraine without aura without status migrainosus, not intractable  -     propranoloL (INDERAL) 10 MG tablet; Take 1 tablet (10 mg total) by mouth 3 (three) times daily for 7 days, THEN 2 tablets (20 mg total) 3 (three) times daily for 7 days, THEN 2 tablets (20 mg total) every 6 " (six) hours for 14 days.  Dispense: 175 tablet; Refill: 0    6. Hypertriglyceridemia  -     Lipid Panel; Future; Expected date: 05/28/2025         This note was generated with the assistance of ambient listening technology. Verbal consent was obtained by the patient and accompanying visitor(s) for the recording of patient appointment to facilitate this note. I attest to having reviewed and edited the generated note for accuracy, though some syntax or spelling errors may persist. Please contact the author of this note for any clarification.         [1]   Patient Active Problem List  Diagnosis    Acanthosis nigricans    Obesity    Essential hypertension    Lumbar disc herniation    Ruptured lumbar intervertebral disc    Fever    CSF leak    Acute cystitis without hematuria    Chronic migraine without aura without status migrainosus, not intractable    Polycystic ovary syndrome

## 2025-05-29 ENCOUNTER — RESULTS FOLLOW-UP (OUTPATIENT)
Dept: FAMILY MEDICINE | Facility: CLINIC | Age: 25
End: 2025-05-29
Payer: COMMERCIAL

## 2025-05-29 DIAGNOSIS — L83 ACANTHOSIS NIGRICANS: ICD-10-CM

## 2025-05-29 DIAGNOSIS — E78.1 HYPERTRIGLYCERIDEMIA: Primary | ICD-10-CM

## 2025-05-29 DIAGNOSIS — E28.2 POLYCYSTIC OVARY SYNDROME: ICD-10-CM

## 2025-05-29 DIAGNOSIS — E66.9 OBESITY, UNSPECIFIED CLASS, UNSPECIFIED OBESITY TYPE, UNSPECIFIED WHETHER SERIOUS COMORBIDITY PRESENT: ICD-10-CM

## 2025-05-29 RX ORDER — ICOSAPENT ETHYL 1 G/1
2 CAPSULE ORAL 2 TIMES DAILY
Qty: 120 CAPSULE | Refills: 3 | Status: SHIPPED | OUTPATIENT
Start: 2025-05-29

## 2025-06-02 ENCOUNTER — E-CONSULT (OUTPATIENT)
Dept: ENDOCRINOLOGY | Facility: CLINIC | Age: 25
End: 2025-06-02
Payer: COMMERCIAL

## 2025-06-02 ENCOUNTER — PATIENT MESSAGE (OUTPATIENT)
Dept: FAMILY MEDICINE | Facility: CLINIC | Age: 25
End: 2025-06-02
Payer: COMMERCIAL

## 2025-06-02 DIAGNOSIS — E78.1 HYPERTRIGLYCERIDEMIA: ICD-10-CM

## 2025-06-02 DIAGNOSIS — E28.2 POLYCYSTIC OVARY SYNDROME: Primary | ICD-10-CM

## 2025-06-02 PROCEDURE — 99451 NTRPROF PH1/NTRNET/EHR 5/>: CPT | Mod: S$GLB,,, | Performed by: STUDENT IN AN ORGANIZED HEALTH CARE EDUCATION/TRAINING PROGRAM

## 2025-06-03 ENCOUNTER — TELEPHONE (OUTPATIENT)
Dept: FAMILY MEDICINE | Facility: CLINIC | Age: 25
End: 2025-06-03
Payer: COMMERCIAL

## 2025-06-03 ENCOUNTER — PATIENT MESSAGE (OUTPATIENT)
Dept: FAMILY MEDICINE | Facility: CLINIC | Age: 25
End: 2025-06-03
Payer: COMMERCIAL

## 2025-06-03 DIAGNOSIS — E78.1 HYPERTRIGLYCERIDEMIA: Primary | ICD-10-CM

## 2025-06-04 ENCOUNTER — LAB VISIT (OUTPATIENT)
Dept: LAB | Facility: HOSPITAL | Age: 25
End: 2025-06-04
Attending: FAMILY MEDICINE
Payer: COMMERCIAL

## 2025-06-04 DIAGNOSIS — E78.1 HYPERTRIGLYCERIDEMIA: ICD-10-CM

## 2025-06-04 PROCEDURE — 36415 COLL VENOUS BLD VENIPUNCTURE: CPT

## 2025-06-04 PROCEDURE — 83701 LIPOPROTEIN BLD HR FRACTION: CPT

## 2025-06-04 RX ORDER — FENOFIBRATE 160 MG/1
160 TABLET ORAL DAILY
Qty: 90 TABLET | Refills: 3 | Status: SHIPPED | OUTPATIENT
Start: 2025-06-04 | End: 2026-06-04

## 2025-06-04 RX ORDER — DROSPIRENONE 4 MG/1
4 TABLET, FILM COATED ORAL DAILY
Qty: 28 TABLET | Refills: 6 | Status: SHIPPED | OUTPATIENT
Start: 2025-06-04

## 2025-06-06 ENCOUNTER — OFFICE VISIT (OUTPATIENT)
Dept: FAMILY MEDICINE | Facility: CLINIC | Age: 25
End: 2025-06-06
Payer: COMMERCIAL

## 2025-06-06 VITALS
SYSTOLIC BLOOD PRESSURE: 128 MMHG | RESPIRATION RATE: 18 BRPM | HEIGHT: 66 IN | WEIGHT: 252.63 LBS | OXYGEN SATURATION: 96 % | HEART RATE: 105 BPM | DIASTOLIC BLOOD PRESSURE: 76 MMHG | BODY MASS INDEX: 40.6 KG/M2

## 2025-06-06 DIAGNOSIS — E03.8 SUBCLINICAL HYPOTHYROIDISM: ICD-10-CM

## 2025-06-06 DIAGNOSIS — G43.709 CHRONIC MIGRAINE WITHOUT AURA WITHOUT STATUS MIGRAINOSUS, NOT INTRACTABLE: Primary | ICD-10-CM

## 2025-06-06 PROCEDURE — 3078F DIAST BP <80 MM HG: CPT | Mod: CPTII,S$GLB,, | Performed by: FAMILY MEDICINE

## 2025-06-06 PROCEDURE — 1159F MED LIST DOCD IN RCRD: CPT | Mod: CPTII,S$GLB,, | Performed by: FAMILY MEDICINE

## 2025-06-06 PROCEDURE — 3044F HG A1C LEVEL LT 7.0%: CPT | Mod: CPTII,S$GLB,, | Performed by: FAMILY MEDICINE

## 2025-06-06 PROCEDURE — 99999 PR PBB SHADOW E&M-EST. PATIENT-LVL V: CPT | Mod: PBBFAC,,, | Performed by: FAMILY MEDICINE

## 2025-06-06 PROCEDURE — 3074F SYST BP LT 130 MM HG: CPT | Mod: CPTII,S$GLB,, | Performed by: FAMILY MEDICINE

## 2025-06-06 PROCEDURE — 99214 OFFICE O/P EST MOD 30 MIN: CPT | Mod: S$GLB,,, | Performed by: FAMILY MEDICINE

## 2025-06-06 PROCEDURE — 3008F BODY MASS INDEX DOCD: CPT | Mod: CPTII,S$GLB,, | Performed by: FAMILY MEDICINE

## 2025-06-06 RX ORDER — ATOGEPANT 60 MG/1
60 TABLET ORAL DAILY
Qty: 30 TABLET | Refills: 11 | Status: SHIPPED | OUTPATIENT
Start: 2025-06-06

## 2025-06-06 NOTE — PATIENT INSTRUCTIONS
Dr. Mayra Hanna  [facility]  [phone]   I am sending a referral. Go ahead and call and see about making an appt.     Thank you for allowing me to participate in your care today. It is an honor to be a part of your healthcare team at [facility]. If you had labs ordered today, you will receive notification via Cob[employer], phone call or mailed letter regarding your results within 7 days. If you have any questions or concerns regarding your visit today, please do not hesitate to contact us.  Sincerely,   Arelis Diaz M.D.

## 2025-06-06 NOTE — PROGRESS NOTES
"  Subjective:       Patient ID: Aysha Waters is a 24 y.o. female.    Chief Complaint: Migraine    History of Present Illness    CHIEF COMPLAINT:  Ms. Waters presents today for follow up of migraines    MIGRAINES:  She reports migraines that began following back surgery complicated by CSF leak. She experiences significant memory issues, including frequently misplacing items, and reports difficulty with cognitive function. Emgality provided relief in the past, while Topamax was ineffective. She has a Propranolol prescription but discontinued after one day.    LABS:  Recent labs showed serum with appearance of strawberry milk, indicating extremely elevated triglycerides. She started fenofibrate yesterday. TSH levels were in the subclinical hypothyroid range, with T4 not reflexed. Other hormone levels were within normal limits, though currently influenced by birth control medication.    MEDICAL HISTORY:  She has history of PCOS with insulin resistance and CSF leak following back surgery.    SOCIAL HISTORY:  She denies alcohol use.      ROS:  Neurological: +migraines, +confusion or disorientation, +memory problems, +vertigo         Review of Systems    Problem List[1]  Aysha has a current medication list which includes the following prescription(s): slynd, fenofibrate, fluoxetine, ibuprofen, icosapent ethyl, metformin, promethazine, propranolol, and qulipta.        Health Maintenance Due   Topic Date Due    HIV Screening  Never done    Chlamydia Screening  06/23/2022      Health Maintenance reviewed and discussed  Objective:      Vitals:    06/06/25 1504   BP: 128/76   Pulse: 105   Resp: 18   SpO2: 96%   Weight: 114.6 kg (252 lb 9.6 oz)   Height: 5' 6" (1.676 m)   PainSc: 0-No pain     Body mass index is 40.77 kg/m².  Physical Exam  Constitutional:       General: She is not in acute distress.     Appearance: Normal appearance. She is not ill-appearing.   Pulmonary:      Effort: Pulmonary effort is normal. No " respiratory distress.   Neurological:      Mental Status: She is alert.   Psychiatric:         Mood and Affect: Mood normal.         Behavior: Behavior normal.         Thought Content: Thought content normal.         Judgment: Judgment normal.               Assessment:       Assessment & Plan    1. Considered oral medication options for migraines due to desire to discontinue injections.  2. Evaluated potential causes of significantly elevated triglycerides, including PCOS, insulin resistance, and estrogen-containing medication.  3. Reviewed e-consult recommendations from endocrinologist regarding management of elevated triglycerides.           Plan:       1. Chronic migraine without aura without status migrainosus, not intractable  -     Ambulatory referral/consult to Neurology; Future; Expected date: 06/13/2025  -     atogepant (QULIPTA) 60 mg Tab; Take 1 tablet (60 mg total) by mouth once daily. (Patient taking differently: Take 60 mg by mouth once daily. PAP referred)  Dispense: 30 tablet; Refill: 11  -     Ambulatory referral/consult to Pharmacy Assistance; Future; Expected date: 06/13/2025    2. Subclinical hypothyroidism  -     TSH; Future; Expected date: 06/06/2025  -     T4, Free; Future; Expected date: 06/06/2025         This note was generated with the assistance of ambient listening technology. Verbal consent was obtained by the patient and accompanying visitor(s) for the recording of patient appointment to facilitate this note. I attest to having reviewed and edited the generated note for accuracy, though some syntax or spelling errors may persist. Please contact the author of this note for any clarification.                [1]   Patient Active Problem List  Diagnosis    Acanthosis nigricans    Obesity    Essential hypertension    Lumbar disc herniation    Ruptured lumbar intervertebral disc    Fever    CSF leak    Acute cystitis without hematuria    Chronic migraine without aura without status  migrainosus, not intractable    Polycystic ovary syndrome

## 2025-06-06 NOTE — PROGRESS NOTES
Tereso     Ms. Waters's case has been assigned to Caden Bello @[phone].       What happens next? Assigned advocate will review your patients chart and research available options.  Patient may be asked to provide specific documentation to help determine eligibility. Failure to provide the requested documentation will delay assistance.    Please note all requests are subject to program availability and patient eligibility verification.   Please note each program has it's own unique eligibility criteria (e.g., income limits, insurance status medical condition, residency).Therefore eligibility is determined by the specific program being applied to not by the Pharmacy Patient Assistance Team.  Please note epic chart must reflect a current order for the requested medication written by an [facility] provider to begin PAP process.   Provider may review progress notes by typing pharmacy patient assistance in Epic search box.       Thank you,   [facility]  [address] [facility] [id number]  [place] [address]  Fax: [phone]  Email: [email or web address]

## 2025-06-08 LAB — LDLC SERPL.ULTRACENT-MCNC: 50 MG/DL

## 2025-06-09 ENCOUNTER — RESULTS FOLLOW-UP (OUTPATIENT)
Dept: PHARMACY | Facility: CLINIC | Age: 25
End: 2025-06-09

## 2025-06-09 ENCOUNTER — TELEPHONE (OUTPATIENT)
Dept: FAMILY MEDICINE | Facility: CLINIC | Age: 25
End: 2025-06-09
Payer: COMMERCIAL

## 2025-06-09 ENCOUNTER — TELEPHONE (OUTPATIENT)
Dept: PHARMACY | Facility: CLINIC | Age: 25
End: 2025-06-09
Payer: COMMERCIAL

## 2025-06-09 NOTE — TELEPHONE ENCOUNTER
First contact attempt has been made via phone, letter, and portal message  . Welcome letter and MAC Enrollment Packet has been sent via letter and portal message . Need to verify type of insurance.  Follow-up will be made in approximately 5 to 10 business days.      Caden Bello  Pharmacy Patient Assistance Team

## 2025-06-09 NOTE — TELEPHONE ENCOUNTER
----- Message from Kaity Yadav sent at 6/6/2025  3:36 PM CDT -----  Regarding: Order for AFSHAN WATERS,     Ms. Waters's case has been assigned to Caden Bello @[phone].       What happens next? Assigned advocate will review your patients chart and research available options.  Patient may be asked to provide specific documentation to help determine eligibility. Failure to provide the requested documentation will delay assistance.    Please note all requests are subject to program availability and patient eligibility verification.   Please note each program has it's own unique eligibility criteria (e.g., income limits, insurance status medical condition, residency).Therefore eligibility is determined by the specific program   being applied to not by the Pharmacy Patient Assistance Team.  Please note epic chart must reflect a current order for the requested medication written by an [facility] provider to begin PAP process.   Provider may review progress notes by typing pharmacy patient assistance in Epic search box.       Thank you,   [facility]  [address] [facility] [id number] [place] [address]  Fax: [phone]  Email: [email or web address]      ----- Message -----  From: Arelis Diaz MD  Sent: 6/6/2025   3:31 PM CDT  To: Pharmacy Patient Assistance Team  Subject: Order for AFSHAN WATERS

## 2025-06-09 NOTE — LETTER
June 9, 2025    Aysha Waters  [address]  [place] [address]             Dear Ms. Waters,    My name is Caden Bello, and I am reaching out on behalf of [facility]s Pharmacy Patient Assistance Team regarding your request for medication assistance. Our goal is to help qualified [facility] patients obtain financial assistance for prescribed medications.    Please note that enrollment into available support may require the following documents:    Completed Medication Access Center authorization forms, Copy of all insurance cards (front and back), Copy of the first 2 pages of your most recent tax return (eg, 1040) , Pharmacy printout, with a start date of January 1, 2025, through present day, and Proof of household income (such as social security award letter, pension statement or 3 consecutive pay stubs)    If you still need assistance with your medications, please reach out to the phone number listed below. If we do not hear back from you, a second contact attempt will be made via mail or your [facility] portal in 5 to 10 business days.    Thank you for giving us the opportunity to assist you with your healthcare needs. We look forward to working with you.      Sincerely  Caden Bello @[phone]  Pharmacy Patient Assistance Team  [address] [facility]  [address]  [place] [address]  Fax: [phone]  Email: [email or web address]

## 2025-06-09 NOTE — TELEPHONE ENCOUNTER
----- Message from Tech Maria G sent at 6/5/2025  4:51 PM CDT -----  Regarding: Lab test cancellation  There was an issue with the Testosterone Free & Total test ordered on this patient from 5/28/2025. Unfortunately, the reference lab received a sample that was too lipemic for testing so the order has been cancelled and will need to be reordered and recollected if clinically indicated. If there are any questions, please call the [facility] lab at [phone] ext. [phone].  Anyone in the Sendout lab will be able to assist you.

## 2025-06-09 NOTE — TELEPHONE ENCOUNTER
----- Message from Tech Maria G sent at 6/5/2025  4:48 PM CDT -----  Regarding: Lab test cancellation  There was an issue with the 17 alpha hydroxyprogesterone test ordered on this patient from 5/28/2025. Unfortunately, the reference lab received a sample that was too lipemic for testing so the order has been cancelled and will need to be reordered and recollected if clinically indicated. If there are any questions, please call the Sendout lab at [phone] ext. [phone].  Anyone in the Sendout lab will be able to assist you.

## 2025-07-08 ENCOUNTER — LAB VISIT (OUTPATIENT)
Dept: LAB | Facility: HOSPITAL | Age: 25
End: 2025-07-08
Attending: FAMILY MEDICINE
Payer: COMMERCIAL

## 2025-07-08 DIAGNOSIS — E66.9 OBESITY, UNSPECIFIED CLASS, UNSPECIFIED OBESITY TYPE, UNSPECIFIED WHETHER SERIOUS COMORBIDITY PRESENT: ICD-10-CM

## 2025-07-08 DIAGNOSIS — E28.2 POLYCYSTIC OVARY SYNDROME: ICD-10-CM

## 2025-07-08 DIAGNOSIS — L83 ACANTHOSIS NIGRICANS: ICD-10-CM

## 2025-07-08 DIAGNOSIS — E78.1 HYPERTRIGLYCERIDEMIA: ICD-10-CM

## 2025-07-08 DIAGNOSIS — E03.8 SUBCLINICAL HYPOTHYROIDISM: ICD-10-CM

## 2025-07-08 DIAGNOSIS — L83 ACANTHOSIS NIGRICANS: Primary | ICD-10-CM

## 2025-07-08 LAB
ALBUMIN SERPL BCP-MCNC: 4.2 G/DL (ref 3.5–5.2)
ALP SERPL-CCNC: 40 UNIT/L (ref 40–150)
ALT SERPL W/O P-5'-P-CCNC: 17 UNIT/L (ref 10–44)
ANION GAP (OHS): 12 MMOL/L (ref 8–16)
AST SERPL-CCNC: 20 UNIT/L (ref 11–45)
BILIRUB SERPL-MCNC: 0.3 MG/DL (ref 0.1–1)
BUN SERPL-MCNC: 9 MG/DL (ref 6–20)
CALCIUM SERPL-MCNC: 9.3 MG/DL (ref 8.7–10.5)
CHLORIDE SERPL-SCNC: 106 MMOL/L (ref 95–110)
CHOLEST SERPL-MCNC: 244 MG/DL (ref 120–199)
CHOLEST/HDLC SERPL: 6.1 {RATIO} (ref 2–5)
CO2 SERPL-SCNC: 19 MMOL/L (ref 23–29)
CREAT SERPL-MCNC: 0.7 MG/DL (ref 0.5–1.4)
GFR SERPLBLD CREATININE-BSD FMLA CKD-EPI: >60 ML/MIN/1.73/M2
GLUCOSE SERPL-MCNC: 129 MG/DL (ref 70–110)
HDLC SERPL-MCNC: 40 MG/DL (ref 40–75)
HDLC SERPL: 16.4 % (ref 20–50)
LDLC SERPL CALC-MCNC: ABNORMAL MG/DL
NONHDLC SERPL-MCNC: 204 MG/DL
POTASSIUM SERPL-SCNC: 4.4 MMOL/L (ref 3.5–5.1)
PROT SERPL-MCNC: 7.2 GM/DL (ref 6–8.4)
SODIUM SERPL-SCNC: 137 MMOL/L (ref 136–145)
T4 FREE SERPL-MCNC: 1.06 NG/DL (ref 0.71–1.51)
T4 FREE SERPL-MCNC: 1.06 NG/DL (ref 0.71–1.51)
TRIGL SERPL-MCNC: 530 MG/DL (ref 30–150)
TSH SERPL-ACNC: 2.23 UIU/ML (ref 0.4–4)

## 2025-07-08 PROCEDURE — 84443 ASSAY THYROID STIM HORMONE: CPT

## 2025-07-08 PROCEDURE — 80053 COMPREHEN METABOLIC PANEL: CPT

## 2025-07-08 PROCEDURE — 80061 LIPID PANEL: CPT

## 2025-07-08 PROCEDURE — 36415 COLL VENOUS BLD VENIPUNCTURE: CPT

## 2025-07-09 PROCEDURE — 82530 CORTISOL FREE: CPT | Performed by: NURSE PRACTITIONER

## 2025-07-09 PROCEDURE — 36415 COLL VENOUS BLD VENIPUNCTURE: CPT

## 2025-07-17 ENCOUNTER — OFFICE VISIT (OUTPATIENT)
Dept: FAMILY MEDICINE | Facility: CLINIC | Age: 25
End: 2025-07-17
Payer: COMMERCIAL

## 2025-07-17 VITALS
SYSTOLIC BLOOD PRESSURE: 136 MMHG | WEIGHT: 254 LBS | HEART RATE: 103 BPM | HEIGHT: 66 IN | RESPIRATION RATE: 18 BRPM | OXYGEN SATURATION: 99 % | BODY MASS INDEX: 40.82 KG/M2 | DIASTOLIC BLOOD PRESSURE: 84 MMHG

## 2025-07-17 DIAGNOSIS — J20.8 ACUTE BACTERIAL BRONCHITIS: Primary | ICD-10-CM

## 2025-07-17 DIAGNOSIS — E78.1 HYPERTRIGLYCERIDEMIA: ICD-10-CM

## 2025-07-17 DIAGNOSIS — B96.89 ACUTE BACTERIAL BRONCHITIS: Primary | ICD-10-CM

## 2025-07-17 PROCEDURE — 3075F SYST BP GE 130 - 139MM HG: CPT | Mod: CPTII,S$GLB,, | Performed by: FAMILY MEDICINE

## 2025-07-17 PROCEDURE — 3079F DIAST BP 80-89 MM HG: CPT | Mod: CPTII,S$GLB,, | Performed by: FAMILY MEDICINE

## 2025-07-17 PROCEDURE — 1160F RVW MEDS BY RX/DR IN RCRD: CPT | Mod: CPTII,S$GLB,, | Performed by: FAMILY MEDICINE

## 2025-07-17 PROCEDURE — 99213 OFFICE O/P EST LOW 20 MIN: CPT | Mod: S$GLB,,, | Performed by: FAMILY MEDICINE

## 2025-07-17 PROCEDURE — 3044F HG A1C LEVEL LT 7.0%: CPT | Mod: CPTII,S$GLB,, | Performed by: FAMILY MEDICINE

## 2025-07-17 PROCEDURE — 1159F MED LIST DOCD IN RCRD: CPT | Mod: CPTII,S$GLB,, | Performed by: FAMILY MEDICINE

## 2025-07-17 PROCEDURE — 3008F BODY MASS INDEX DOCD: CPT | Mod: CPTII,S$GLB,, | Performed by: FAMILY MEDICINE

## 2025-07-17 PROCEDURE — 99999 PR PBB SHADOW E&M-EST. PATIENT-LVL IV: CPT | Mod: PBBFAC,,, | Performed by: FAMILY MEDICINE

## 2025-07-17 RX ORDER — AZITHROMYCIN 250 MG/1
TABLET, FILM COATED ORAL
Qty: 6 TABLET | Refills: 0 | Status: SHIPPED | OUTPATIENT
Start: 2025-07-17 | End: 2025-07-22

## 2025-07-17 NOTE — PROGRESS NOTES
Subjective:       Patient ID: Aysha Waters is a 24 y.o. female.    Chief Complaint: Follow-up    Ms Waters presents today for follow up on dyslipidemia.     Lab Results       Component                Value               Date                       CHOL                     244 (H)             07/08/2025                 CHOL                     676 (H)             05/28/2025                 CHOL                     205 (H)             01/14/2025             Lab Results       Component                Value               Date                       HDL                      40                  07/08/2025                 HDL                      26 (L)              05/28/2025                 HDL                      39 (L)              01/14/2025            Lab Results       Component                Value               Date                       LDLCALC                                      07/08/2025              Comment:    Invalid, Trig>400.0      The National Cholesterol Education Program (NCEP) has set the  following guidelines (reference values) for LDL Cholesterol:  Optimal.......................<130 mg/dL  Borderline High...............130-159 mg/dL  High..........................160-189 mg/dL  Very High.....................>190 mg/dL       LDLCALC                                      05/28/2025              Comment:    Invalid, Trig>400.0      The National Cholesterol Education Program (NCEP) has set the  following guidelines (reference values) for LDL Cholesterol:  Optimal.......................<130 mg/dL  Borderline High...............130-159 mg/dL  High..........................160-189 mg/dL  Very High.....................>190 mg/dL       LDLCALC                                      01/14/2025             Invalid, Trig>400.0   Lab Results       Component                Value               Date                       TRIG                     530 (H)             07/08/2025                 TRIG                      >3,600 (H)          05/28/2025                 TRIG                     419 (H)             01/14/2025            Lab Results       Component                Value               Date                       TOTALCHOLEST             6.1 (H)             07/08/2025                 TOTALCHOLEST             26.0 (H)            05/28/2025                 TOTALCHOLEST             5.3 (H)             01/14/2025            Lab Results       Component                Value               Date                       NONHDLCHOL               204                 07/08/2025                 NONHDLCHOL               650                 05/28/2025                 NONHDLCHOL               166                 01/14/2025            Lab Results       Component                Value               Date                       CHOLHDL                  16.4 (L)            07/08/2025                 CHOLHDL                  3.8 (L)             05/28/2025                 CHOLHDL                  19.0 (L)            01/14/2025              Last visit made switch toprogesterone only birth control. Started fenofibrate. Was unable to get approval on the vascepa.     Now with bronchitis symptoms for several weeks. Getting worse.    Follow-up  Associated symptoms include coughing.       .Problem List[1]  Aysha has a current medication list which includes the following prescription(s): slynd, fenofibrate, fluoxetine, ibuprofen, icosapent ethyl, metformin, promethazine, propranolol, qulipta, and azithromycin.    Review of Systems   Respiratory:  Positive for cough and wheezing.          Health Maintenance Due   Topic Date Due    HIV Screening  Never done    Chlamydia Screening  06/23/2022      Health Maintenance reviewed and discussed.   Objective:      Physical Exam  Vitals and nursing note reviewed.   Constitutional:       General: She is not in acute distress.     Appearance: She is obese. She is not ill-appearing.   Cardiovascular:      Rate and Rhythm:  Normal rate and regular rhythm.      Heart sounds: No murmur heard.  Pulmonary:      Effort: Pulmonary effort is normal.      Breath sounds: Rhonchi present. No wheezing or rales.   Skin:     General: Skin is warm and dry.      Findings: No rash.   Neurological:      Mental Status: She is alert.   Psychiatric:         Mood and Affect: Mood normal.         Behavior: Behavior normal.         Assessment:       1. Acute bacterial bronchitis    2. Hypertriglyceridemia        Plan:       1. Acute bacterial bronchitis  -     azithromycin (Z-MAIA) 250 MG tablet; Take 2 tablets by mouth on day 1; Take 1 tablet by mouth on days 2-5  Dispense: 6 tablet; Refill: 0    2. Hypertriglyceridemia  -     E-Consult to General Cardiology                     [1]   Patient Active Problem List  Diagnosis    Acanthosis nigricans    Obesity    Essential hypertension    Lumbar disc herniation    Ruptured lumbar intervertebral disc    Fever    CSF leak    Acute cystitis without hematuria    Chronic migraine without aura without status migrainosus, not intractable    Polycystic ovary syndrome

## 2025-07-19 LAB — W CORTISOL, FREE, LC/MS/MS: 0.32 MCG/DL

## 2025-07-21 ENCOUNTER — E-CONSULT (OUTPATIENT)
Dept: CARDIOLOGY | Facility: CLINIC | Age: 25
End: 2025-07-21
Payer: COMMERCIAL

## 2025-07-21 DIAGNOSIS — E66.813 CLASS 3 OBESITY: ICD-10-CM

## 2025-07-21 DIAGNOSIS — E78.1 HYPERTRIGLYCERIDEMIA: Primary | ICD-10-CM

## 2025-07-22 NOTE — CONSULTS
[facility] - Cardiology - [facility]  Response for E-Consult     Patient Name: Aysha Waters  MRN: [id number]  Primary Care Provider: Arelis Diaz MD   Requesting Provider: Arelis Diaz MD  Consults    Recommendation: Add Atorva 80 start with half pill and generic Lovaza 2 twice    Additional future steps to consider: really needs weight loss and consider the GLP1 meds or Bariatric Surgery    Total time of Consultation: 15    I did notspeak to the requesting provider verbally about this.     *This eConsult is based on the clinical data available to me and is furnished without benefit of a physical examination. The eConsult will need to be interpreted in light of any clinical issues or changes in patient status not available to me at the time of filing this eConsults. Significant changes in patient condition or level of acuity should result in immediate formal consultation and reevaluation. Please alert me if you have further questions.    Thank you for this eConsult referral.     MD [facility]j [facility] Cardiology [address]

## 2025-07-23 ENCOUNTER — PATIENT MESSAGE (OUTPATIENT)
Dept: FAMILY MEDICINE | Facility: CLINIC | Age: 25
End: 2025-07-23
Payer: COMMERCIAL

## 2025-08-15 ENCOUNTER — OFFICE VISIT (OUTPATIENT)
Dept: FAMILY MEDICINE | Facility: CLINIC | Age: 25
End: 2025-08-15
Payer: COMMERCIAL

## 2025-08-15 VITALS
DIASTOLIC BLOOD PRESSURE: 80 MMHG | BODY MASS INDEX: 40.85 KG/M2 | HEIGHT: 66 IN | WEIGHT: 254.19 LBS | SYSTOLIC BLOOD PRESSURE: 122 MMHG | HEART RATE: 109 BPM | OXYGEN SATURATION: 99 % | RESPIRATION RATE: 18 BRPM

## 2025-08-15 DIAGNOSIS — G89.29 CHRONIC LEFT SHOULDER PAIN: Primary | ICD-10-CM

## 2025-08-15 DIAGNOSIS — M25.512 CHRONIC LEFT SHOULDER PAIN: Primary | ICD-10-CM

## 2025-08-15 DIAGNOSIS — M89.8X1 PAIN OF LEFT CLAVICLE: ICD-10-CM

## 2025-08-15 DIAGNOSIS — R42 VERTIGO: ICD-10-CM

## 2025-08-15 PROCEDURE — 3044F HG A1C LEVEL LT 7.0%: CPT | Mod: CPTII,S$GLB,, | Performed by: FAMILY MEDICINE

## 2025-08-15 PROCEDURE — 3079F DIAST BP 80-89 MM HG: CPT | Mod: CPTII,S$GLB,, | Performed by: FAMILY MEDICINE

## 2025-08-15 PROCEDURE — 3074F SYST BP LT 130 MM HG: CPT | Mod: CPTII,S$GLB,, | Performed by: FAMILY MEDICINE

## 2025-08-15 PROCEDURE — 1160F RVW MEDS BY RX/DR IN RCRD: CPT | Mod: CPTII,S$GLB,, | Performed by: FAMILY MEDICINE

## 2025-08-15 PROCEDURE — 99213 OFFICE O/P EST LOW 20 MIN: CPT | Mod: S$GLB,,, | Performed by: FAMILY MEDICINE

## 2025-08-15 PROCEDURE — 99999 PR PBB SHADOW E&M-EST. PATIENT-LVL V: CPT | Mod: PBBFAC,,, | Performed by: FAMILY MEDICINE

## 2025-08-15 PROCEDURE — 1159F MED LIST DOCD IN RCRD: CPT | Mod: CPTII,S$GLB,, | Performed by: FAMILY MEDICINE

## 2025-08-15 PROCEDURE — 3008F BODY MASS INDEX DOCD: CPT | Mod: CPTII,S$GLB,, | Performed by: FAMILY MEDICINE

## 2025-08-15 RX ORDER — GALCANEZUMAB 120 MG/ML
INJECTION, SOLUTION SUBCUTANEOUS
COMMUNITY

## 2025-08-18 ENCOUNTER — HOSPITAL ENCOUNTER (OUTPATIENT)
Dept: RADIOLOGY | Facility: HOSPITAL | Age: 25
Discharge: HOME | End: 2025-08-18
Attending: FAMILY MEDICINE
Payer: COMMERCIAL

## 2025-08-18 DIAGNOSIS — M89.8X1 PAIN OF LEFT CLAVICLE: ICD-10-CM

## 2025-08-18 DIAGNOSIS — M25.512 CHRONIC LEFT SHOULDER PAIN: Primary | ICD-10-CM

## 2025-08-18 DIAGNOSIS — G89.29 CHRONIC LEFT SHOULDER PAIN: ICD-10-CM

## 2025-08-18 DIAGNOSIS — G89.29 CHRONIC LEFT SHOULDER PAIN: Primary | ICD-10-CM

## 2025-08-18 DIAGNOSIS — M25.512 CHRONIC LEFT SHOULDER PAIN: ICD-10-CM

## 2025-08-18 PROCEDURE — 73030 X-RAY EXAM OF SHOULDER: CPT | Mod: 26,LT,, | Performed by: RADIOLOGY

## 2025-08-18 PROCEDURE — 73030 X-RAY EXAM OF SHOULDER: CPT | Mod: TC,PN,LT

## 2025-08-27 ENCOUNTER — CLINICAL SUPPORT (OUTPATIENT)
Dept: REHABILITATION | Facility: HOSPITAL | Age: 25
End: 2025-08-27
Attending: FAMILY MEDICINE
Payer: COMMERCIAL

## 2025-08-27 DIAGNOSIS — M25.612 DECREASED ROM OF LEFT SHOULDER: Primary | ICD-10-CM

## 2025-08-27 DIAGNOSIS — R29.898 DECREASED STRENGTH OF UPPER EXTREMITY: ICD-10-CM

## 2025-08-27 PROCEDURE — 97530 THERAPEUTIC ACTIVITIES: CPT | Mod: PN

## 2025-08-27 PROCEDURE — 97161 PT EVAL LOW COMPLEX 20 MIN: CPT | Mod: PN

## 2025-09-02 ENCOUNTER — CLINICAL SUPPORT (OUTPATIENT)
Dept: REHABILITATION | Facility: HOSPITAL | Age: 25
End: 2025-09-02
Payer: COMMERCIAL

## 2025-09-02 DIAGNOSIS — M25.612 DECREASED ROM OF LEFT SHOULDER: Primary | ICD-10-CM

## 2025-09-02 DIAGNOSIS — R29.898 DECREASED STRENGTH OF UPPER EXTREMITY: ICD-10-CM

## 2025-09-02 PROCEDURE — 97110 THERAPEUTIC EXERCISES: CPT | Mod: PN

## 2025-09-02 PROCEDURE — 97530 THERAPEUTIC ACTIVITIES: CPT | Mod: PN

## 2025-09-04 ENCOUNTER — CLINICAL SUPPORT (OUTPATIENT)
Dept: REHABILITATION | Facility: HOSPITAL | Age: 25
End: 2025-09-04
Payer: COMMERCIAL

## 2025-09-04 DIAGNOSIS — M25.612 DECREASED ROM OF LEFT SHOULDER: Primary | ICD-10-CM

## 2025-09-04 DIAGNOSIS — R29.898 DECREASED STRENGTH OF UPPER EXTREMITY: ICD-10-CM

## 2025-09-04 PROCEDURE — 97110 THERAPEUTIC EXERCISES: CPT | Mod: PN

## 2025-09-04 PROCEDURE — 97530 THERAPEUTIC ACTIVITIES: CPT | Mod: PN

## (undated) DEVICE — DRAPE STERI INSTRUMENT 1018

## (undated) DEVICE — STAPLER SKIN ROTATING HEAD

## (undated) DEVICE — TAPE MEDIPORE 4IN X 2YDS

## (undated) DEVICE — DRAPE STERI-DRAPE 1000 17X11IN

## (undated) DEVICE — SEE MEDLINE ITEM 157117

## (undated) DEVICE — SUT 4/0 18IN NUROLON BLK B

## (undated) DEVICE — PAD ABD 8X10 STERILE

## (undated) DEVICE — UNDERGLOVES BIOGEL PI SIZE 8.5

## (undated) DEVICE — GOWN B1 X-LG X-LONG

## (undated) DEVICE — SPONGE LAP 18X18 PREWASHED

## (undated) DEVICE — BLADE SURG CARBON STEEL #10

## (undated) DEVICE — COVER SURG LIGHT HANDLE

## (undated) DEVICE — Device

## (undated) DEVICE — ELECTRODE REM PLYHSV RETURN 9

## (undated) DEVICE — UNDERGLOVES BIOGEL PI SIZE 8

## (undated) DEVICE — PACK CUSTOM LUMBAR LAM SLI

## (undated) DEVICE — APPLICATOR CHLORAPREP ORN 26ML

## (undated) DEVICE — LINER SUCTION 3000CC

## (undated) DEVICE — SUT CTD VICRYL 1 UND OS-8

## (undated) DEVICE — DRESSING N ADH OIL EMUL 3X8 3S

## (undated) DEVICE — TUBE SUCTION YANKAUER HI CAP

## (undated) DEVICE — STRAP OR TABLE 5IN X 72IN

## (undated) DEVICE — SUT CTD VICRYL 2-0 UND BR O

## (undated) DEVICE — FORCEP BAYO INSU SGL USE STRGT

## (undated) DEVICE — SEE MEDLINE ITEM 157131

## (undated) DEVICE — SLEEVE SCD EXPRESS CALF MEDIUM

## (undated) DEVICE — GLOVE SURG ULTRA TOUCH 8

## (undated) DEVICE — NDL BOX COUNTER

## (undated) DEVICE — DRAPE INCISE IOBAN 2 23X17IN

## (undated) DEVICE — SEE MEDLINE ITEM 146292

## (undated) DEVICE — ALCOHOL 70% ISOP RUBBING 4OZ

## (undated) DEVICE — PAD ABDOMINAL 5X9 STERILE

## (undated) DEVICE — SOL 9P NACL IRR PIC IL

## (undated) DEVICE — SPONGE SUPER KERLIX 6X6.75IN

## (undated) DEVICE — SYRINGE 0.9% NACL 10MIL PREFIL

## (undated) DEVICE — SPONGE LAP 4X18 PREWASHED

## (undated) DEVICE — SYS LABEL CORRECT MED

## (undated) DEVICE — DRESSING GAUZE 6PLY 4X4

## (undated) DEVICE — NDL SPINAL 18GX3.5 SPINOCAN

## (undated) DEVICE — SEE MEDLINE ITEM 152622

## (undated) DEVICE — DRAPE C-ARMOR EQUIPMENT COVER

## (undated) DEVICE — DRAPE C ARM 42 X 120 10/BX